# Patient Record
Sex: MALE | Race: WHITE | Employment: FULL TIME | URBAN - METROPOLITAN AREA
[De-identification: names, ages, dates, MRNs, and addresses within clinical notes are randomized per-mention and may not be internally consistent; named-entity substitution may affect disease eponyms.]

---

## 2017-03-06 DIAGNOSIS — R73.03 PREDIABETES: ICD-10-CM

## 2017-03-27 ENCOUNTER — OFFICE VISIT (OUTPATIENT)
Dept: FAMILY MEDICINE CLINIC | Age: 67
End: 2017-03-27
Payer: MEDICARE

## 2017-03-27 VITALS
BODY MASS INDEX: 38.6 KG/M2 | SYSTOLIC BLOOD PRESSURE: 107 MMHG | HEIGHT: 70 IN | TEMPERATURE: 97.2 F | WEIGHT: 269.6 LBS | DIASTOLIC BLOOD PRESSURE: 64 MMHG | HEART RATE: 61 BPM

## 2017-03-27 DIAGNOSIS — R21 RASH: Primary | ICD-10-CM

## 2017-03-27 PROCEDURE — 99213 OFFICE O/P EST LOW 20 MIN: CPT | Performed by: STUDENT IN AN ORGANIZED HEALTH CARE EDUCATION/TRAINING PROGRAM

## 2017-03-27 RX ORDER — CEPHALEXIN 500 MG/1
500 CAPSULE ORAL 2 TIMES DAILY
Qty: 14 CAPSULE | Refills: 0 | Status: SHIPPED | OUTPATIENT
Start: 2017-03-27 | End: 2017-04-03

## 2017-03-27 RX ORDER — PREDNISONE 20 MG/1
20 TABLET ORAL 2 TIMES DAILY
Qty: 10 TABLET | Refills: 0 | Status: SHIPPED | OUTPATIENT
Start: 2017-03-27 | End: 2017-04-01

## 2017-03-27 RX ORDER — HYDROXYZINE HYDROCHLORIDE 25 MG/1
25 TABLET, FILM COATED ORAL EVERY 8 HOURS PRN
Qty: 45 TABLET | Refills: 0 | Status: SHIPPED | OUTPATIENT
Start: 2017-03-27 | End: 2017-04-11

## 2017-03-27 ASSESSMENT — ENCOUNTER SYMPTOMS
SINUS PRESSURE: 0
NAUSEA: 0
VOMITING: 0
EYE REDNESS: 0
COUGH: 0
ABDOMINAL PAIN: 0
SHORTNESS OF BREATH: 0
EYE PAIN: 0
COLOR CHANGE: 1
RHINORRHEA: 0

## 2017-04-06 DIAGNOSIS — I25.10 CORONARY ARTERY DISEASE DUE TO LIPID RICH PLAQUE: ICD-10-CM

## 2017-04-06 DIAGNOSIS — I25.83 CORONARY ARTERY DISEASE DUE TO LIPID RICH PLAQUE: ICD-10-CM

## 2017-04-06 RX ORDER — METOPROLOL TARTRATE 50 MG/1
50 TABLET, FILM COATED ORAL 2 TIMES DAILY
Qty: 60 TABLET | Refills: 3 | Status: SHIPPED | OUTPATIENT
Start: 2017-04-06 | End: 2021-09-21 | Stop reason: SDUPTHER

## 2017-06-04 DIAGNOSIS — R73.03 PREDIABETES: ICD-10-CM

## 2017-06-05 ENCOUNTER — HOSPITAL ENCOUNTER (OUTPATIENT)
Age: 67
Setting detail: SPECIMEN
Discharge: HOME | End: 2017-06-05
Payer: MEDICARE

## 2017-06-05 LAB
ALT SERPL-CCNC: 19 U/L (ref 5–41)
AST SERPL-CCNC: 16 U/L
CHOLESTEROL/HDL RATIO: 4
CHOLESTEROL: 166 MG/DL
GGT: 54 U/L (ref 8–61)
HDLC SERPL-MCNC: 41 MG/DL
LDL CHOLESTEROL: 87 MG/DL (ref 0–130)
TRIGL SERPL-MCNC: 190 MG/DL
VLDLC SERPL CALC-MCNC: ABNORMAL MG/DL (ref 1–30)

## 2017-06-19 ENCOUNTER — OFFICE VISIT (OUTPATIENT)
Dept: FAMILY MEDICINE CLINIC | Age: 67
End: 2017-06-19
Payer: MEDICARE

## 2017-06-19 VITALS
HEIGHT: 70 IN | DIASTOLIC BLOOD PRESSURE: 69 MMHG | BODY MASS INDEX: 38.51 KG/M2 | HEART RATE: 58 BPM | SYSTOLIC BLOOD PRESSURE: 112 MMHG | TEMPERATURE: 97.4 F | WEIGHT: 269 LBS

## 2017-06-19 DIAGNOSIS — I10 ESSENTIAL HYPERTENSION: ICD-10-CM

## 2017-06-19 DIAGNOSIS — R73.03 PREDIABETES: Primary | ICD-10-CM

## 2017-06-19 LAB — HBA1C MFR BLD: 6.9 %

## 2017-06-19 PROCEDURE — 99213 OFFICE O/P EST LOW 20 MIN: CPT | Performed by: FAMILY MEDICINE

## 2017-06-19 PROCEDURE — 83036 HEMOGLOBIN GLYCOSYLATED A1C: CPT | Performed by: FAMILY MEDICINE

## 2017-06-19 ASSESSMENT — ENCOUNTER SYMPTOMS
WHEEZING: 0
SHORTNESS OF BREATH: 0
GASTROINTESTINAL NEGATIVE: 1

## 2017-09-19 ENCOUNTER — OFFICE VISIT (OUTPATIENT)
Dept: FAMILY MEDICINE CLINIC | Age: 67
End: 2017-09-19
Payer: MEDICARE

## 2017-09-19 VITALS
TEMPERATURE: 97.2 F | BODY MASS INDEX: 37.14 KG/M2 | WEIGHT: 259.4 LBS | SYSTOLIC BLOOD PRESSURE: 109 MMHG | DIASTOLIC BLOOD PRESSURE: 72 MMHG | HEART RATE: 50 BPM | HEIGHT: 70 IN

## 2017-09-19 DIAGNOSIS — I10 ESSENTIAL HYPERTENSION: Primary | ICD-10-CM

## 2017-09-19 DIAGNOSIS — L91.8 SKIN TAG: ICD-10-CM

## 2017-09-19 DIAGNOSIS — Z00.00 HEALTH CARE MAINTENANCE: ICD-10-CM

## 2017-09-19 PROCEDURE — 90471 IMMUNIZATION ADMIN: CPT | Performed by: FAMILY MEDICINE

## 2017-09-19 PROCEDURE — 90688 IIV4 VACCINE SPLT 0.5 ML IM: CPT | Performed by: FAMILY MEDICINE

## 2017-09-19 PROCEDURE — 99213 OFFICE O/P EST LOW 20 MIN: CPT | Performed by: FAMILY MEDICINE

## 2017-09-19 RX ORDER — DOXYCYCLINE HYCLATE 100 MG
100 TABLET ORAL 2 TIMES DAILY
Qty: 20 TABLET | Refills: 0 | Status: SHIPPED | OUTPATIENT
Start: 2017-09-19 | End: 2017-09-29

## 2017-09-19 ASSESSMENT — PATIENT HEALTH QUESTIONNAIRE - PHQ9
SUM OF ALL RESPONSES TO PHQ9 QUESTIONS 1 & 2: 0
1. LITTLE INTEREST OR PLEASURE IN DOING THINGS: 0
2. FEELING DOWN, DEPRESSED OR HOPELESS: 0
SUM OF ALL RESPONSES TO PHQ QUESTIONS 1-9: 0

## 2017-09-19 ASSESSMENT — ENCOUNTER SYMPTOMS
GASTROINTESTINAL NEGATIVE: 1
SHORTNESS OF BREATH: 0
COUGH: 0

## 2017-09-24 DIAGNOSIS — R73.03 PREDIABETES: ICD-10-CM

## 2017-09-28 ENCOUNTER — HOSPITAL ENCOUNTER (OUTPATIENT)
Age: 67
Setting detail: SPECIMEN
Discharge: HOME | End: 2017-09-28
Payer: MEDICARE

## 2017-09-28 ENCOUNTER — PROCEDURE VISIT (OUTPATIENT)
Dept: FAMILY MEDICINE CLINIC | Age: 67
End: 2017-09-28
Payer: MEDICARE

## 2017-09-28 VITALS
HEART RATE: 60 BPM | WEIGHT: 261.4 LBS | BODY MASS INDEX: 37.42 KG/M2 | TEMPERATURE: 97.4 F | SYSTOLIC BLOOD PRESSURE: 119 MMHG | DIASTOLIC BLOOD PRESSURE: 70 MMHG | HEIGHT: 70 IN

## 2017-09-28 DIAGNOSIS — L91.8 SKIN TAG: Primary | ICD-10-CM

## 2017-09-28 PROCEDURE — 11401 EXC TR-EXT B9+MARG 0.6-1 CM: CPT | Performed by: STUDENT IN AN ORGANIZED HEALTH CARE EDUCATION/TRAINING PROGRAM

## 2017-10-02 LAB — DERMATOLOGY PATHOLOGY REPORT: NORMAL

## 2017-10-06 ENCOUNTER — OFFICE VISIT (OUTPATIENT)
Dept: FAMILY MEDICINE CLINIC | Age: 67
End: 2017-10-06
Payer: MEDICARE

## 2017-10-06 VITALS
BODY MASS INDEX: 37.31 KG/M2 | DIASTOLIC BLOOD PRESSURE: 67 MMHG | HEART RATE: 59 BPM | WEIGHT: 260.6 LBS | SYSTOLIC BLOOD PRESSURE: 110 MMHG | TEMPERATURE: 96.3 F | HEIGHT: 70 IN

## 2017-10-06 DIAGNOSIS — Z48.02 VISIT FOR SUTURE REMOVAL: Primary | ICD-10-CM

## 2017-10-06 PROCEDURE — 99024 POSTOP FOLLOW-UP VISIT: CPT | Performed by: FAMILY MEDICINE

## 2017-10-06 ASSESSMENT — ENCOUNTER SYMPTOMS
ABDOMINAL PAIN: 0
COUGH: 0
DIARRHEA: 0
SHORTNESS OF BREATH: 0
NAUSEA: 0
WHEEZING: 0
BLURRED VISION: 0
CONSTIPATION: 0
VOMITING: 0

## 2017-10-06 NOTE — MR AVS SNAPSHOT
After Visit Summary             Rubi France   10/6/2017 9:15 AM   Office Visit    Description:  Male : 1950   Provider:  Jose A Martinez MD   Department:  [facility]              Your Follow-Up and Future Appointments         Below is a list of your follow-up and future appointments. This may not be a complete list as you may have made appointments directly with providers that we are not aware of or your providers may have made some for you. Please call your providers to confirm appointments. It is important to keep your appointments. Please bring your current insurance card, photo ID, co-pay, and all medication bottles to your appointment. If self-pay, payment is expected at the time of service. Your To-Do List     Follow-Up    Return if symptoms worsen or fail to improve. Information from Your Visit        Department     Name Address Phone Fax    [phone] [phone]      You Were Seen for:         Comments    Visit for suture removal   [[id number]]         Vital Signs     Blood Pressure Pulse Temperature Height Weight Body Mass Index    110/67 (Site: Left Arm, Position: Sitting, Cuff Size: Large Adult) 59 96.3 °F (35.7 °C) (Temporal) 5' 10\" (1.778 m) 260 lb 9.6 oz (118.2 kg) 37.39 kg/m2    Smoking Status                   Former Smoker           Additional Information about your Body Mass Index (BMI)           Your BMI as listed above is considered obese (30 or more). BMI is an estimate of body fat, calculated from your height and weight. The higher your BMI, the greater your risk of heart disease, high blood pressure, type 2 diabetes, stroke, gallstones, arthritis, sleep apnea, and certain cancers. BMI is not perfect. It may overestimate body fat in athletes and people who are more muscular.   Even a small weight loss (between 5 and 10 percent of your current weight) by decreasing your calorie intake and becoming more physically active will help lower your risk of developing or worsening diseases associated with obesity. Learn more at: [employer]          Instructions    Thank you for letting us take care of you today. We hope all your questions were addressed. If a question was overlooked or something else comes to mind after you return home, please contact a member of your Care Team listed below. Please make sure you have a routine office visit set up to follow-up on [address]. Your Care Team at [place] [address] is Team #2  Jaron Jacobo DO (Faculty)  Rajwinder Garcia MD (Resindent)  Sandy Crowder MD (Resident)  Lilli Barlow MD (Resident)  Lex Pacheco MD (Resident)  Dewey Dial MD (Resident)  JESSI Lebron., A  Enoch Parikh, GARCIA Walls ([facility])  Eliz Gallegos RN, ([address] [facility])  Rupal Sheridan, Ph.D., (Behavioral Services)  [address] (Clinical Pharmacist)     Office phone number: [phone]    If you need to get in right away due to illness, please be advised we have \"Same Day\" appointments available Monday-Friday. Please call us at [phone] option #1 to schedule your \"Same Day\" appointment. Medications and Orders      Your Current Medications Are              metFORMIN (GLUCOPHAGE) 1000 MG tablet TAKE ONE TABLET BY MOUTH TWICE A DAY WITH MEALS    metoprolol tartrate (LOPRESSOR) 50 MG tablet Take 1 tablet by mouth 2 times daily    furosemide (LASIX) 40 MG tablet Take 1 tablet by mouth daily    aspirin (ASPIRIN LOW DOSE) 81 MG chewable tablet CHEW ONE TABLET BY MOUTH DAILY    pravastatin (PRAVACHOL) 20 MG tablet Take 1 tablet by mouth daily    Lancets MISC 1 each by Does not apply route daily Test 2 times daily.  Diagnosis DM    Glucose Blood (BLOOD GLUCOSE TEST STRIPS) STRP 1 each by In Vitro route 2 times daily Test 2 times daily. Diagnosis DM    Blood Glucose Monitoring Suppl (BLOOD GLUCOSE METER) KIT Test 2 times daily Diagnosis DM    Alcohol Swabs (ALCOHOL PREP) 70 % PADS 1 each by Does not apply route 2 times daily. Test 2 times daily. Diagnosis DM    Lancet Device MISC 1 Device by Does not apply route once for 1 dose. Test 2 times daily. Diagnosis DM      Allergies           No Known Allergies         Additional Information        Basic Information     Date Of Birth Sex Race Ethnicity Preferred Language    1950 Male White Non-/Non  English      Problem List as of 10/6/2017  Date Reviewed: 6/19/2017                Essential hypertension    Health care maintenance    Chronic congestive heart failure (HCC)    Pre-diabetes    Chronic diastolic congestive heart failure (HCC)    Medication refill    Obesity    Metabolic syndrome    CAD (coronary artery disease)    Ankle ulcer ([facility] [address]    Metabolic syndrome      Your Goals as of 10/6/2017 at 9:46 AM                 Weight    Weight < 200 lb (90.719 kg)       Immunizations as of 10/6/2017     Name Date    Influenza Virus Vaccine 10/9/2015, 10/20/2014    Influenza, Quadv, 3 Years and older, IM 9/19/2017, 12/9/2016    Pneumococcal Polysaccharide (Nulihfnsj29) 6/7/2012    Tdap (Boostrix, Adacel) 12/9/2016      Preventive Care        Date Due    Cholesterol Screening 6/5/2022    Colonoscopy 8/1/2023    Tetanus Combination Vaccine (2 - Td) 12/9/2026            MyChart Signup           Our records indicate that you have declined MyChart signup.

## 2017-10-06 NOTE — PROGRESS NOTES
Subjective:    Cole Hanna is a 61 y.o. male with  has a past medical history of Acute coronary syndrome ([address] CAD (coronary artery disease); Chronic diastolic congestive heart failure ([address] Essential hypertension; Hyperlipidemia; Kidney stones; Metabolic syndrome; Obesity; and Umbilical hernia. HPI Patient is a 61year old male who is here for follow-up. Patient had a skin tag removed from his right upper thigh last week. After the excision the patient received one stitch. Today on physical examination, the suture site looks clean dry and non-infected. No other complaints from patient. Review of Systems   Constitutional: Negative for chills and fever. Eyes: Negative for blurred vision. Respiratory: Negative for cough, shortness of breath and wheezing. Cardiovascular: Negative for chest pain. Gastrointestinal: Negative for abdominal pain, constipation, diarrhea, nausea and vomiting. Neurological: Negative for dizziness and headaches. Objective:    /67 (Site: Left Arm, Position: Sitting, Cuff Size: Large Adult)  Pulse 59  Temp 96.3 °F (35.7 °C) (Temporal)   Ht 5' 10\" (1.778 m)  Wt 260 lb 9.6 oz (118.2 kg)  BMI 37.39 kg/m2   BP Readings from Last 3 Encounters:   10/06/17 110/67   09/28/17 119/70   09/19/17 109/72     Physical Exam   Constitutional: He is oriented to person, place, and time and well-developed, well-nourished, and in no distress. No distress. HENT:   Head: Normocephalic and atraumatic. Eyes: Pupils are equal, round, and reactive to light. Cardiovascular: Normal rate, regular rhythm, normal heart sounds and intact distal pulses. No murmur heard. Pulmonary/Chest: Effort normal and breath sounds normal. He has no wheezes. Lymphadenopathy:     He has no cervical adenopathy. Neurological: He is alert and oriented to person, place, and time. Skin: He is not diaphoretic. Nursing note and vitals reviewed.     BP Readings from Last 3 Encounters: 10/06/17 110/67   09/28/17 119/70   09/19/17 109/72     /67 (Site: Left Arm, Position: Sitting, Cuff Size: Large Adult)  Pulse 59  Temp 96.3 °F (35.7 °C) (Temporal)   Ht 5' 10\" (1.778 m)  Wt 260 lb 9.6 oz (118.2 kg)  BMI 37.39 kg/m2  Lab Results   Component Value Date    WBC 8.2 06/18/2012    HGB 14.9 06/18/2012    HCT 43.9 06/18/2012     06/18/2012    CHOL 166 06/05/2017    TRIG 190 (H) 06/05/2017    HDL 41 06/05/2017    ALT 19 06/05/2017    AST 16 06/05/2017     12/09/2016    K 4.2 12/09/2016    CL 98 12/09/2016    CREATININE 1.02 12/09/2016    BUN 10 12/09/2016    CO2 27 12/09/2016    PSA 0.33 09/19/2013    INR 1.0 02/24/2012    LABA1C 6.9 06/19/2017    LABMICR 9 09/01/2015     Lab Results   Component Value Date    CALCIUM 8.9 12/09/2016     Lab Results   Component Value Date    LDLCHOLESTEROL 87 06/05/2017       Assessment and Plan:    1. Visit for suture removal  - One suture removed successfully without pain or complications. Patient advised to keep site clean and dry. Return if any complications. Patient expressed understanding. Lillian Warren received counseling on the following healthy behaviors: nutrition and exercise  Reviewed prior labs and health maintenance. Continue current medications, diet and exercise. Discussed use, benefit, and side effects of prescribed medications. Barriers to medication compliance addressed. Patient given educational materials - see patient instructions. All patient questions answered. Patient voiced understanding. Requested Prescriptions      No prescriptions requested or ordered in this encounter       There are no discontinued medications.

## 2017-10-06 NOTE — PATIENT INSTRUCTIONS
Thank you for letting us take care of you today. We hope all your questions were addressed. If a question was overlooked or something else comes to mind after you return home, please contact a member of your Care Team listed below. Please make sure you have a routine office visit set up to follow-up on [address]. Your Care Team at [place] [address] is Team #2  Latesha Wallace DO (Faculty)  Christiane Oliva MD (Resindent)  Avelina Stevenson MD (Resident)  Corwin Jordan MD (Resident)  Cinthya Connors MD (Resident)  Naresh Verduzco MD (Resident)  JESSI Alfredo, GARCIA Langston, GARCIA Yoon ([facility])  Brian Scheuermann RN, ([address] [facility])  Katlyn Mora, Ph.D., (Behavioral Services)  Atul Tao, [facility] (Clinical Pharmacist)     Office phone number: [phone]    If you need to get in right away due to illness, please be advised we have \"Same Day\" appointments available Monday-Friday. Please call us at [phone] option #1 to schedule your \"Same Day\" appointment.

## 2017-11-27 DIAGNOSIS — R73.03 PREDIABETES: ICD-10-CM

## 2017-12-26 DIAGNOSIS — R73.03 PREDIABETES: ICD-10-CM

## 2018-01-31 DIAGNOSIS — R73.03 PREDIABETES: ICD-10-CM

## 2018-03-04 DIAGNOSIS — R73.03 PREDIABETES: ICD-10-CM

## 2018-03-09 DIAGNOSIS — R73.03 PREDIABETES: ICD-10-CM

## 2018-03-22 ENCOUNTER — OFFICE VISIT (OUTPATIENT)
Dept: FAMILY MEDICINE CLINIC | Age: 68
End: 2018-03-22
Payer: MEDICARE

## 2018-03-22 ENCOUNTER — HOSPITAL ENCOUNTER (OUTPATIENT)
Age: 68
Setting detail: SPECIMEN
Discharge: HOME | End: 2018-03-22
Payer: MEDICARE

## 2018-03-22 VITALS
TEMPERATURE: 97.2 F | WEIGHT: 261.8 LBS | BODY MASS INDEX: 37.48 KG/M2 | HEART RATE: 51 BPM | HEIGHT: 70 IN | SYSTOLIC BLOOD PRESSURE: 132 MMHG | DIASTOLIC BLOOD PRESSURE: 79 MMHG

## 2018-03-22 DIAGNOSIS — E08.00 DIABETES MELLITUS DUE TO UNDERLYING CONDITION WITH HYPEROSMOLARITY WITHOUT COMA, WITHOUT LONG-TERM CURRENT USE OF INSULIN (HCC): ICD-10-CM

## 2018-03-22 DIAGNOSIS — Z00.00 HEALTH CARE MAINTENANCE: ICD-10-CM

## 2018-03-22 DIAGNOSIS — I10 ESSENTIAL HYPERTENSION: Primary | ICD-10-CM

## 2018-03-22 DIAGNOSIS — I10 ESSENTIAL HYPERTENSION: ICD-10-CM

## 2018-03-22 LAB
ALT SERPL-CCNC: 20 U/L (ref 5–41)
ANION GAP SERPL CALCULATED.3IONS-SCNC: 11 MMOL/L (ref 9–17)
BUN BLDV-MCNC: 11 MG/DL (ref 8–23)
BUN/CREAT BLD: ABNORMAL (ref 9–20)
CALCIUM SERPL-MCNC: 9.1 MG/DL (ref 8.6–10.4)
CHLORIDE BLD-SCNC: 98 MMOL/L (ref 98–107)
CHOLESTEROL/HDL RATIO: 4.2
CHOLESTEROL: 183 MG/DL
CO2: 28 MMOL/L (ref 20–31)
CREAT SERPL-MCNC: 0.82 MG/DL (ref 0.7–1.2)
GFR AFRICAN AMERICAN: >60 ML/MIN
GFR NON-AFRICAN AMERICAN: >60 ML/MIN
GFR SERPL CREATININE-BSD FRML MDRD: ABNORMAL ML/MIN/{1.73_M2}
GFR SERPL CREATININE-BSD FRML MDRD: ABNORMAL ML/MIN/{1.73_M2}
GLUCOSE BLD-MCNC: 112 MG/DL (ref 70–99)
HBA1C MFR BLD: 6.2 %
HDLC SERPL-MCNC: 44 MG/DL
LDL CHOLESTEROL: 99 MG/DL (ref 0–130)
POTASSIUM SERPL-SCNC: 4.3 MMOL/L (ref 3.7–5.3)
SODIUM BLD-SCNC: 137 MMOL/L (ref 135–144)
TRIGL SERPL-MCNC: 198 MG/DL
VLDLC SERPL CALC-MCNC: ABNORMAL MG/DL (ref 1–30)

## 2018-03-22 PROCEDURE — 4040F PNEUMOC VAC/ADMIN/RCVD: CPT | Performed by: FAMILY MEDICINE

## 2018-03-22 PROCEDURE — 99213 OFFICE O/P EST LOW 20 MIN: CPT | Performed by: FAMILY MEDICINE

## 2018-03-22 PROCEDURE — 90670 PCV13 VACCINE IM: CPT

## 2018-03-22 PROCEDURE — G8598 ASA/ANTIPLAT THER USED: HCPCS | Performed by: FAMILY MEDICINE

## 2018-03-22 PROCEDURE — 83036 HEMOGLOBIN GLYCOSYLATED A1C: CPT | Performed by: FAMILY MEDICINE

## 2018-03-22 PROCEDURE — G8482 FLU IMMUNIZE ORDER/ADMIN: HCPCS | Performed by: FAMILY MEDICINE

## 2018-03-22 PROCEDURE — G8427 DOCREV CUR MEDS BY ELIG CLIN: HCPCS | Performed by: FAMILY MEDICINE

## 2018-03-22 PROCEDURE — 1036F TOBACCO NON-USER: CPT | Performed by: FAMILY MEDICINE

## 2018-03-22 PROCEDURE — 3017F COLORECTAL CA SCREEN DOC REV: CPT | Performed by: FAMILY MEDICINE

## 2018-03-22 PROCEDURE — 1123F ACP DISCUSS/DSCN MKR DOCD: CPT | Performed by: FAMILY MEDICINE

## 2018-03-22 PROCEDURE — G8417 CALC BMI ABV UP PARAM F/U: HCPCS | Performed by: FAMILY MEDICINE

## 2018-03-22 ASSESSMENT — ENCOUNTER SYMPTOMS
COUGH: 0
GASTROINTESTINAL NEGATIVE: 1
SHORTNESS OF BREATH: 0

## 2018-03-22 NOTE — PROGRESS NOTES
reviewed. Assessment and plan:       1. Essential hypertension  - Stable  - Continue taking current medications  - DAsh diet and exercise  - Basic Metabolic Panel; Future    2. Diabetes mellitus , without long-term current use of insulin (HCC)  - Hemoglobin A1c today in office is 6.2%  It has come down from 6.9%  - Continue taking metformin  - metFORMIN (GLUCOPHAGE) 1000 MG tablet; Take 1 tablet by mouth 2 times daily (with meals) TAKE ONE TABLET BY MOUTH TWICE A DAY WITH MEALS  Dispense: 180 tablet; Refill: 0  - POCT glycosylated hemoglobin (Hb A1C)    3. Health care maintenance  - PREVNAR 13 IM (Pneumococcal conjugate vaccine 13-valent)        Requested Prescriptions     Signed Prescriptions Disp Refills    metFORMIN (GLUCOPHAGE) 1000 MG tablet 180 tablet 0     Sig: Take 1 tablet by mouth 2 times daily (with meals) TAKE ONE TABLET BY MOUTH TWICE A DAY WITH MEALS       Medications Discontinued During This Encounter   Medication Reason    metFORMIN (GLUCOPHAGE) 1000 MG tablet Reorder       Deep Campbell received counseling on the following healthy behaviors: nutrition, exercise and medication adherence    Patient given educational materials : see patient instruction     Discussed use, benefit, and side effects of prescribed medications. Barriers to medication compliance addressed. All patient questions answered. Pt voiced understanding. Return in about 3 months (around 6/22/2018) for htn, DM.

## 2018-04-07 DIAGNOSIS — R73.03 PREDIABETES: ICD-10-CM

## 2018-04-19 ENCOUNTER — OFFICE VISIT (OUTPATIENT)
Dept: FAMILY MEDICINE CLINIC | Age: 68
End: 2018-04-19
Payer: MEDICARE

## 2018-04-19 VITALS
SYSTOLIC BLOOD PRESSURE: 123 MMHG | DIASTOLIC BLOOD PRESSURE: 75 MMHG | HEIGHT: 70 IN | HEART RATE: 55 BPM | TEMPERATURE: 97 F | BODY MASS INDEX: 36.94 KG/M2 | WEIGHT: 258 LBS

## 2018-04-19 DIAGNOSIS — J06.9 VIRAL URI: Primary | ICD-10-CM

## 2018-04-19 DIAGNOSIS — I50.32 CHRONIC DIASTOLIC CONGESTIVE HEART FAILURE (HCC): ICD-10-CM

## 2018-04-19 PROCEDURE — 99213 OFFICE O/P EST LOW 20 MIN: CPT | Performed by: INTERNAL MEDICINE

## 2018-04-19 PROCEDURE — G8598 ASA/ANTIPLAT THER USED: HCPCS | Performed by: INTERNAL MEDICINE

## 2018-04-19 PROCEDURE — 1036F TOBACCO NON-USER: CPT | Performed by: INTERNAL MEDICINE

## 2018-04-19 PROCEDURE — 99213 OFFICE O/P EST LOW 20 MIN: CPT

## 2018-04-19 PROCEDURE — G8427 DOCREV CUR MEDS BY ELIG CLIN: HCPCS | Performed by: INTERNAL MEDICINE

## 2018-04-19 PROCEDURE — 4040F PNEUMOC VAC/ADMIN/RCVD: CPT | Performed by: INTERNAL MEDICINE

## 2018-04-19 PROCEDURE — 1123F ACP DISCUSS/DSCN MKR DOCD: CPT | Performed by: INTERNAL MEDICINE

## 2018-04-19 PROCEDURE — G8417 CALC BMI ABV UP PARAM F/U: HCPCS | Performed by: INTERNAL MEDICINE

## 2018-04-19 PROCEDURE — 3017F COLORECTAL CA SCREEN DOC REV: CPT | Performed by: INTERNAL MEDICINE

## 2018-04-19 RX ORDER — ALBUTEROL SULFATE 90 UG/1
2 AEROSOL, METERED RESPIRATORY (INHALATION) EVERY 6 HOURS PRN
Qty: 1 INHALER | Refills: 2 | Status: SHIPPED | OUTPATIENT
Start: 2018-04-19 | End: 2021-09-21 | Stop reason: SDUPTHER

## 2018-04-19 ASSESSMENT — ENCOUNTER SYMPTOMS
ABDOMINAL PAIN: 0
COUGH: 1
SHORTNESS OF BREATH: 1

## 2018-04-23 ENCOUNTER — HOSPITAL ENCOUNTER (OUTPATIENT)
Age: 68
Discharge: HOME | End: 2018-04-25
Payer: MEDICARE

## 2018-04-23 ENCOUNTER — HOSPITAL ENCOUNTER (OUTPATIENT)
Dept: GENERAL RADIOLOGY | Age: 68
Discharge: HOME | End: 2018-04-25
Payer: MEDICARE

## 2018-04-23 DIAGNOSIS — J06.9 VIRAL URI: ICD-10-CM

## 2018-04-23 PROCEDURE — 71046 X-RAY EXAM CHEST 2 VIEWS: CPT

## 2018-06-27 ENCOUNTER — OFFICE VISIT (OUTPATIENT)
Dept: FAMILY MEDICINE CLINIC | Age: 68
End: 2018-06-27
Payer: MEDICARE

## 2018-06-27 VITALS
BODY MASS INDEX: 37.34 KG/M2 | SYSTOLIC BLOOD PRESSURE: 117 MMHG | DIASTOLIC BLOOD PRESSURE: 72 MMHG | HEART RATE: 52 BPM | WEIGHT: 260.8 LBS | HEIGHT: 70 IN | TEMPERATURE: 97.5 F

## 2018-06-27 DIAGNOSIS — I10 ESSENTIAL HYPERTENSION: Primary | ICD-10-CM

## 2018-06-27 DIAGNOSIS — R73.03 PREDIABETES: ICD-10-CM

## 2018-06-27 PROCEDURE — 1036F TOBACCO NON-USER: CPT | Performed by: FAMILY MEDICINE

## 2018-06-27 PROCEDURE — G8427 DOCREV CUR MEDS BY ELIG CLIN: HCPCS | Performed by: FAMILY MEDICINE

## 2018-06-27 PROCEDURE — 1123F ACP DISCUSS/DSCN MKR DOCD: CPT | Performed by: FAMILY MEDICINE

## 2018-06-27 PROCEDURE — 3017F COLORECTAL CA SCREEN DOC REV: CPT | Performed by: FAMILY MEDICINE

## 2018-06-27 PROCEDURE — G8598 ASA/ANTIPLAT THER USED: HCPCS | Performed by: FAMILY MEDICINE

## 2018-06-27 PROCEDURE — 99213 OFFICE O/P EST LOW 20 MIN: CPT | Performed by: FAMILY MEDICINE

## 2018-06-27 PROCEDURE — G8417 CALC BMI ABV UP PARAM F/U: HCPCS | Performed by: FAMILY MEDICINE

## 2018-06-27 PROCEDURE — 4040F PNEUMOC VAC/ADMIN/RCVD: CPT | Performed by: FAMILY MEDICINE

## 2018-06-27 ASSESSMENT — ENCOUNTER SYMPTOMS
GASTROINTESTINAL NEGATIVE: 1
COUGH: 0
SHORTNESS OF BREATH: 0

## 2018-10-04 DIAGNOSIS — R73.03 PREDIABETES: ICD-10-CM

## 2018-11-01 ENCOUNTER — OFFICE VISIT (OUTPATIENT)
Dept: FAMILY MEDICINE CLINIC | Age: 68
End: 2018-11-01
Payer: MEDICARE

## 2018-11-01 VITALS
WEIGHT: 244.4 LBS | DIASTOLIC BLOOD PRESSURE: 75 MMHG | HEART RATE: 60 BPM | HEIGHT: 70 IN | SYSTOLIC BLOOD PRESSURE: 120 MMHG | TEMPERATURE: 97.6 F | BODY MASS INDEX: 34.99 KG/M2

## 2018-11-01 DIAGNOSIS — I10 ESSENTIAL HYPERTENSION: Primary | ICD-10-CM

## 2018-11-01 DIAGNOSIS — Z13.6 ENCOUNTER FOR ABDOMINAL AORTIC ANEURYSM (AAA) SCREENING: ICD-10-CM

## 2018-11-01 DIAGNOSIS — R73.03 PREDIABETES: ICD-10-CM

## 2018-11-01 DIAGNOSIS — L97.321 SKIN ULCER OF LEFT ANKLE, LIMITED TO BREAKDOWN OF SKIN (HCC): ICD-10-CM

## 2018-11-01 LAB — HBA1C MFR BLD: 6.3 %

## 2018-11-01 PROCEDURE — G8482 FLU IMMUNIZE ORDER/ADMIN: HCPCS | Performed by: STUDENT IN AN ORGANIZED HEALTH CARE EDUCATION/TRAINING PROGRAM

## 2018-11-01 PROCEDURE — 1123F ACP DISCUSS/DSCN MKR DOCD: CPT | Performed by: STUDENT IN AN ORGANIZED HEALTH CARE EDUCATION/TRAINING PROGRAM

## 2018-11-01 PROCEDURE — 3017F COLORECTAL CA SCREEN DOC REV: CPT | Performed by: STUDENT IN AN ORGANIZED HEALTH CARE EDUCATION/TRAINING PROGRAM

## 2018-11-01 PROCEDURE — 83036 HEMOGLOBIN GLYCOSYLATED A1C: CPT | Performed by: STUDENT IN AN ORGANIZED HEALTH CARE EDUCATION/TRAINING PROGRAM

## 2018-11-01 PROCEDURE — 99214 OFFICE O/P EST MOD 30 MIN: CPT | Performed by: STUDENT IN AN ORGANIZED HEALTH CARE EDUCATION/TRAINING PROGRAM

## 2018-11-01 PROCEDURE — G8417 CALC BMI ABV UP PARAM F/U: HCPCS | Performed by: STUDENT IN AN ORGANIZED HEALTH CARE EDUCATION/TRAINING PROGRAM

## 2018-11-01 PROCEDURE — 99213 OFFICE O/P EST LOW 20 MIN: CPT | Performed by: STUDENT IN AN ORGANIZED HEALTH CARE EDUCATION/TRAINING PROGRAM

## 2018-11-01 PROCEDURE — 1101F PT FALLS ASSESS-DOCD LE1/YR: CPT | Performed by: STUDENT IN AN ORGANIZED HEALTH CARE EDUCATION/TRAINING PROGRAM

## 2018-11-01 PROCEDURE — 90688 IIV4 VACCINE SPLT 0.5 ML IM: CPT | Performed by: STUDENT IN AN ORGANIZED HEALTH CARE EDUCATION/TRAINING PROGRAM

## 2018-11-01 PROCEDURE — 1036F TOBACCO NON-USER: CPT | Performed by: STUDENT IN AN ORGANIZED HEALTH CARE EDUCATION/TRAINING PROGRAM

## 2018-11-01 PROCEDURE — 4040F PNEUMOC VAC/ADMIN/RCVD: CPT | Performed by: STUDENT IN AN ORGANIZED HEALTH CARE EDUCATION/TRAINING PROGRAM

## 2018-11-01 PROCEDURE — G8598 ASA/ANTIPLAT THER USED: HCPCS | Performed by: STUDENT IN AN ORGANIZED HEALTH CARE EDUCATION/TRAINING PROGRAM

## 2018-11-01 PROCEDURE — G8427 DOCREV CUR MEDS BY ELIG CLIN: HCPCS | Performed by: STUDENT IN AN ORGANIZED HEALTH CARE EDUCATION/TRAINING PROGRAM

## 2018-11-01 ASSESSMENT — PATIENT HEALTH QUESTIONNAIRE - PHQ9
2. FEELING DOWN, DEPRESSED OR HOPELESS: 0
1. LITTLE INTEREST OR PLEASURE IN DOING THINGS: 0
SUM OF ALL RESPONSES TO PHQ QUESTIONS 1-9: 0
SUM OF ALL RESPONSES TO PHQ QUESTIONS 1-9: 0
SUM OF ALL RESPONSES TO PHQ9 QUESTIONS 1 & 2: 0

## 2018-11-01 ASSESSMENT — ENCOUNTER SYMPTOMS
CHEST TIGHTNESS: 0
ABDOMINAL PAIN: 0
SHORTNESS OF BREATH: 0
COUGH: 0

## 2018-11-01 NOTE — PROGRESS NOTES
Subjective:    Gabe Hernandez is a 79 y.o. male with  has a past medical history of Acute coronary syndrome ([facility] [address] CAD (coronary artery disease); Chronic diastolic congestive heart failure ([facility] [address] Essential hypertension; Hyperlipidemia; Kidney stones; Metabolic syndrome; Obesity; and Umbilical hernia. Presented to the office todayfor:  Chief Complaint   Patient presents with    Diabetes     here for 3 month dm follow up   [address]     patient would like flu shot       HPI    Patient is here for 6 month pre-diabetes and HTN f/u. His blood pressure is under control on lopressor and lasix. He denied any symptoms of elevated BP including headache, chest pain, blurry vision or SOB/fatigue. The patient's last A1c was 6.2, today it is 6.3%. He is taking 1000 mg metformin BID. He is compliant on medication and he is also compliant on diet and exercise. He denied any polyuria, poly dipsia or polyphagia. Denied any numbness or tingling. Review of Systems   Constitutional: Negative for activity change, appetite change, chills and fatigue. HENT: Negative for congestion. Respiratory: Negative for cough, chest tightness and shortness of breath. Cardiovascular: Negative for chest pain and palpitations. Gastrointestinal: Negative for abdominal pain. Endocrine: Negative for polydipsia, polyphagia and polyuria. Genitourinary: Negative for difficulty urinating. Neurological: Negative for numbness. The patient has a No family history on file. Objective:    /75 (Site: Right Upper Arm, Position: Sitting, Cuff Size: Large Adult) Comment: machine  Pulse 60   Temp 97.6 °F (36.4 °C) (Oral)   Ht 5' 10\" (1.778 m)   Wt 244 lb 6.4 oz (110.9 kg)   BMI 35.07 kg/m²    BP Readings from Last 3 Encounters:   11/01/18 120/75   06/27/18 117/72   04/19/18 123/75       Physical Exam   Constitutional: He is oriented to person, place, and time.  He appears well-developed and

## 2019-04-23 ENCOUNTER — HOSPITAL ENCOUNTER (OUTPATIENT)
Age: 69
Setting detail: SPECIMEN
Discharge: HOME | End: 2019-04-23
Payer: MEDICARE

## 2019-04-23 LAB
CREATININE URINE: 173.3 MG/DL (ref 39–259)
MICROALBUMIN/CREAT 24H UR: 18 MG/L
MICROALBUMIN/CREAT UR-RTO: 10 MCG/MG CREAT

## 2019-04-25 ENCOUNTER — HOSPITAL ENCOUNTER (OUTPATIENT)
Dept: VASCULAR LAB | Age: 69
Discharge: HOME | End: 2019-04-25
Payer: MEDICARE

## 2019-04-25 DIAGNOSIS — Z13.6 ENCOUNTER FOR ABDOMINAL AORTIC ANEURYSM (AAA) SCREENING: ICD-10-CM

## 2019-04-25 PROCEDURE — 76706 US ABDL AORTA SCREEN AAA: CPT

## 2019-06-10 ENCOUNTER — OFFICE VISIT (OUTPATIENT)
Dept: FAMILY MEDICINE CLINIC | Age: 69
End: 2019-06-10
Payer: MEDICARE

## 2019-06-10 ENCOUNTER — HOSPITAL ENCOUNTER (OUTPATIENT)
Age: 69
Setting detail: SPECIMEN
Discharge: HOME | End: 2019-06-10
Payer: MEDICARE

## 2019-06-10 VITALS
HEIGHT: 70 IN | HEART RATE: 58 BPM | TEMPERATURE: 98 F | BODY MASS INDEX: 37.94 KG/M2 | DIASTOLIC BLOOD PRESSURE: 71 MMHG | WEIGHT: 265 LBS | SYSTOLIC BLOOD PRESSURE: 117 MMHG

## 2019-06-10 DIAGNOSIS — Z87.442 HISTORY OF KIDNEY STONES: ICD-10-CM

## 2019-06-10 DIAGNOSIS — I50.32 CHRONIC DIASTOLIC CONGESTIVE HEART FAILURE (HCC): ICD-10-CM

## 2019-06-10 DIAGNOSIS — I10 ESSENTIAL HYPERTENSION: Primary | ICD-10-CM

## 2019-06-10 DIAGNOSIS — L97.321 SKIN ULCER OF LEFT ANKLE, LIMITED TO BREAKDOWN OF SKIN (HCC): ICD-10-CM

## 2019-06-10 DIAGNOSIS — R73.03 PREDIABETES: ICD-10-CM

## 2019-06-10 DIAGNOSIS — I10 ESSENTIAL HYPERTENSION: ICD-10-CM

## 2019-06-10 LAB
ANION GAP SERPL CALCULATED.3IONS-SCNC: 12 MMOL/L (ref 9–17)
BUN BLDV-MCNC: 12 MG/DL (ref 8–23)
BUN/CREAT BLD: ABNORMAL (ref 9–20)
CALCIUM SERPL-MCNC: 9.2 MG/DL (ref 8.6–10.4)
CHLORIDE BLD-SCNC: 101 MMOL/L (ref 98–107)
CO2: 24 MMOL/L (ref 20–31)
CREAT SERPL-MCNC: 0.93 MG/DL (ref 0.7–1.2)
GFR AFRICAN AMERICAN: >60 ML/MIN
GFR NON-AFRICAN AMERICAN: >60 ML/MIN
GFR SERPL CREATININE-BSD FRML MDRD: ABNORMAL ML/MIN/{1.73_M2}
GFR SERPL CREATININE-BSD FRML MDRD: ABNORMAL ML/MIN/{1.73_M2}
GLUCOSE BLD-MCNC: 150 MG/DL (ref 70–99)
HBA1C MFR BLD: 6.4 %
POTASSIUM SERPL-SCNC: 4.4 MMOL/L (ref 3.7–5.3)
SODIUM BLD-SCNC: 137 MMOL/L (ref 135–144)

## 2019-06-10 PROCEDURE — 99211 OFF/OP EST MAY X REQ PHY/QHP: CPT | Performed by: STUDENT IN AN ORGANIZED HEALTH CARE EDUCATION/TRAINING PROGRAM

## 2019-06-10 PROCEDURE — 99213 OFFICE O/P EST LOW 20 MIN: CPT | Performed by: STUDENT IN AN ORGANIZED HEALTH CARE EDUCATION/TRAINING PROGRAM

## 2019-06-10 PROCEDURE — 83036 HEMOGLOBIN GLYCOSYLATED A1C: CPT | Performed by: STUDENT IN AN ORGANIZED HEALTH CARE EDUCATION/TRAINING PROGRAM

## 2019-06-10 ASSESSMENT — ENCOUNTER SYMPTOMS
STRIDOR: 0
CHEST TIGHTNESS: 0
SHORTNESS OF BREATH: 0
ABDOMINAL PAIN: 0
WHEEZING: 0

## 2019-06-10 ASSESSMENT — PATIENT HEALTH QUESTIONNAIRE - PHQ9
SUM OF ALL RESPONSES TO PHQ9 QUESTIONS 1 & 2: 0
SUM OF ALL RESPONSES TO PHQ QUESTIONS 1-9: 0
SUM OF ALL RESPONSES TO PHQ QUESTIONS 1-9: 0
2. FEELING DOWN, DEPRESSED OR HOPELESS: 0
1. LITTLE INTEREST OR PLEASURE IN DOING THINGS: 0

## 2019-06-10 NOTE — PROGRESS NOTES
care with him  - [facility]) - Astrid Georges MD, Urology, [place]    4. Skin ulcer of left ankle, limited to breakdown of skin ([address]  -resolved    5. Chronic diastolic congestive heart failure (HCC)  -states that cardiology discontinued his lasix  -removed from med list  -stable, no SOB, minimal lower extremity edema          Requested Prescriptions     Signed Prescriptions Disp Refills    metFORMIN (GLUCOPHAGE) 1000 MG tablet 180 tablet 1     Sig: Take 1 tablet by mouth 2 times daily (with meals)       Medications Discontinued During This Encounter   Medication Reason    metFORMIN (GLUCOPHAGE) 1000 MG tablet Rafael Ruby received counseling on the following healthy behaviors: nutrition, exercise and medication adherence    Discussed use,benefit, and side effects of prescribed medications. Barriers to medication compliance addressed. All patient questions answered. Pt voiced understanding. Return in about 6 months (around 12/10/2019) for follow up, diabetes, htn. Disclaimer: Some orall of this note was transcribed using voice-recognition software. This may cause typographical errors occasionally. Although all effort is made to fix these errors, please do not hesitate to contact our office if there Sincere Tijerina concern with the understanding of this note.

## 2019-06-10 NOTE — PROGRESS NOTES
Attending Physician Statement  I have discussed the care of Shadia Wren, 79 y.o. male,including pertinent history and exam findings,  with the resident Dr. Etta Subramanian MD.  History:  Chief Complaint   Patient presents with    Results     3 month follow up with lab results from 04/23    Other     Thinks he passed a kidney stone a few weeks ago. Possibly see Neurlogy.  Foot Pain     Would like referral to podiatry. Patient is here for follow up on prediabetes, hypertension and nephrolithiasis. I have reviewed the key elements of the encounter with the resident. Examination was done by resident as documented in residents note. BP Readings from Last 3 Encounters:   06/10/19 117/71   11/01/18 120/75   06/27/18 117/72     /71 (Site: Left Upper Arm, Position: Sitting, Cuff Size: Large Adult) Comment: machine  Pulse 58   Temp 98 °F (36.7 °C) (Oral)   Ht 5' 10\" (1.778 m)   Wt 265 lb (120.2 kg)   BMI 38.02 kg/m²   Lab Results   Component Value Date    WBC 8.2 06/18/2012    HGB 14.9 06/18/2012    HCT 43.9 06/18/2012     06/18/2012    CHOL 183 03/22/2018    TRIG 198 (H) 03/22/2018    HDL 44 03/22/2018    ALT 20 03/22/2018    AST 16 06/05/2017     06/10/2019    K 4.4 06/10/2019     06/10/2019    CREATININE 0.93 06/10/2019    BUN 12 06/10/2019    CO2 24 06/10/2019    PSA 0.33 09/19/2013    INR 1.0 02/24/2012    LABA1C 6.4 06/10/2019    LABMICR 10 04/23/2019     Lab Results   Component Value Date    CALCIUM 9.2 06/10/2019     Lab Results   Component Value Date    LDLCHOLESTEROL 99 03/22/2018     I agree with the assessment, plan and diagnosis of    Diagnosis Orders   1. Essential hypertension  Basic Metabolic Panel   2. Prediabetes  POCT glycosylated hemoglobin (Hb A1C)    metFORMIN (GLUCOPHAGE) 1000 MG tablet    [facility]   3. History of kidney stones  [facility]) - Zahra Fuller MD, Urology, [place]   4.  Skin ulcer of left ankle, limited to breakdown of skin ([facility] [address]     5. Chronic diastolic congestive heart failure ([facility] [address]       I agree with  orders as documented by the resident. Recommendations:   Patient was counseled regarding diet and life style changes. BP is controlled. Metformin refilled. Labs updated and referral provided to urology. Health maintenance reviewed, eye and foot exam updated and labs updated. Return in about 6 months (around 12/10/2019) for follow up, diabetes, htn.    ([address] [facility] ) Dr. Shannan Arreola MD

## 2019-06-10 NOTE — PROGRESS NOTES
Visit Information    Have you changed or started any medications since your last visit including any over-the-counter medicines, vitamins, or herbal medicines? no   Have you stopped taking any of your medications? Is so, why? -  no  Are you having any side effects from any of your medications? - no    Have you seen any other physician or provider since your last visit?  no   Have you had any other diagnostic tests since your last visit?  no   Have you been seen in the emergency room and/or had an admission in a hospital since we last saw you?  no   Have you had your routine dental cleaning in the past 6 months?  no     Do you have an active MyChart account? If no, what is the barrier?   No:     Patient Care Team:  Soo Spencer MD as PCP - General (Family Medicine)  Son Cabrera MD as PCP - Cardiology (Internal Medicine)  Adelso Cee MD as Referring Physician (Cardiology)  Nitza Weller MD as Surgeon (Cardiothoracic Surgery)  Yahaira Reyes DO (Podiatry)    Medical History Review  Past Medical, Family, and Social History reviewed and does not contribute to the patient presenting condition    Health Maintenance   Topic Date Due    Diabetic retinal exam  10/26/1962    Shingles Vaccine (1 of 2) 10/26/2002    Lipid screen  2019    Pneumococcal 65+ years Vaccine (2 of 2 - PPSV23) 2019    Potassium monitoring  2019    Creatinine monitoring  2019    Diabetic foot exam  2019    A1C test (Diabetic or Prediabetic)  2019    Diabetic microalbuminuria test  2020    Colon cancer screen colonoscopy  2023    DTaP/Tdap/Td vaccine (2 - Td) 2026    Flu vaccine  Completed    AAA screen  Completed    Hepatitis C screen  Completed

## 2019-08-26 ENCOUNTER — OFFICE VISIT (OUTPATIENT)
Dept: PODIATRY | Age: 69
End: 2019-08-26
Payer: MEDICARE

## 2019-08-26 DIAGNOSIS — M79.604 PAIN IN BOTH LOWER EXTREMITIES: ICD-10-CM

## 2019-08-26 DIAGNOSIS — I73.9 PVD (PERIPHERAL VASCULAR DISEASE) (HCC): ICD-10-CM

## 2019-08-26 DIAGNOSIS — M79.605 PAIN IN BOTH LOWER EXTREMITIES: ICD-10-CM

## 2019-08-26 DIAGNOSIS — B35.1 DERMATOPHYTOSIS OF NAIL: Primary | ICD-10-CM

## 2019-08-26 DIAGNOSIS — E11.51 TYPE II DIABETES MELLITUS WITH PERIPHERAL CIRCULATORY DISORDER (HCC): ICD-10-CM

## 2019-08-26 PROCEDURE — 11721 DEBRIDE NAIL 6 OR MORE: CPT | Performed by: PODIATRIST

## 2019-08-26 PROCEDURE — 99203 OFFICE O/P NEW LOW 30 MIN: CPT | Performed by: PODIATRIST

## 2019-08-29 PROBLEM — E66.01 SEVERE OBESITY (BMI 35.0-39.9) WITH COMORBIDITY (HCC): Status: ACTIVE | Noted: 2018-12-24

## 2019-09-09 ENCOUNTER — HOSPITAL ENCOUNTER (OUTPATIENT)
Dept: GENERAL RADIOLOGY | Age: 69
Discharge: HOME | End: 2019-09-11
Payer: MEDICARE

## 2019-09-09 ENCOUNTER — HOSPITAL ENCOUNTER (OUTPATIENT)
Age: 69
Discharge: HOME | End: 2019-09-09
Payer: MEDICARE

## 2019-09-09 ENCOUNTER — HOSPITAL ENCOUNTER (OUTPATIENT)
Age: 69
Discharge: HOME | End: 2019-09-11
Payer: MEDICARE

## 2019-09-09 DIAGNOSIS — N13.8 HYPERTROPHY OF PROSTATE WITH URINARY OBSTRUCTION: ICD-10-CM

## 2019-09-09 DIAGNOSIS — Z87.442 HISTORY OF KIDNEY STONES: ICD-10-CM

## 2019-09-09 DIAGNOSIS — N40.1 HYPERTROPHY OF PROSTATE WITH URINARY OBSTRUCTION: ICD-10-CM

## 2019-09-09 LAB — PROSTATE SPECIFIC ANTIGEN: 0.35 UG/L

## 2019-09-09 PROCEDURE — 74018 RADEX ABDOMEN 1 VIEW: CPT

## 2019-09-09 PROCEDURE — 36415 COLL VENOUS BLD VENIPUNCTURE: CPT

## 2019-09-09 PROCEDURE — 84153 ASSAY OF PSA TOTAL: CPT

## 2019-09-23 PROBLEM — Z87.442 HISTORY OF KIDNEY STONES: Status: ACTIVE | Noted: 2019-09-23

## 2019-09-23 PROBLEM — N13.8 HYPERTROPHY OF PROSTATE WITH URINARY OBSTRUCTION: Status: ACTIVE | Noted: 2019-09-23

## 2019-09-23 PROBLEM — N40.1 HYPERTROPHY OF PROSTATE WITH URINARY OBSTRUCTION: Status: ACTIVE | Noted: 2019-09-23

## 2019-11-05 ENCOUNTER — OFFICE VISIT (OUTPATIENT)
Dept: FAMILY MEDICINE CLINIC | Age: 69
End: 2019-11-05
Payer: MEDICARE

## 2019-11-05 VITALS
SYSTOLIC BLOOD PRESSURE: 114 MMHG | WEIGHT: 261 LBS | DIASTOLIC BLOOD PRESSURE: 66 MMHG | BODY MASS INDEX: 37.37 KG/M2 | HEART RATE: 53 BPM | HEIGHT: 70 IN

## 2019-11-05 DIAGNOSIS — Z23 NEED FOR PROPHYLACTIC VACCINATION AGAINST STREPTOCOCCUS PNEUMONIAE (PNEUMOCOCCUS): ICD-10-CM

## 2019-11-05 DIAGNOSIS — Z23 NEED FOR PROPHYLACTIC VACCINATION AND INOCULATION AGAINST VARICELLA: ICD-10-CM

## 2019-11-05 DIAGNOSIS — I10 ESSENTIAL HYPERTENSION: Primary | ICD-10-CM

## 2019-11-05 DIAGNOSIS — E11.9 TYPE 2 DIABETES MELLITUS WITHOUT COMPLICATION, WITHOUT LONG-TERM CURRENT USE OF INSULIN (HCC): ICD-10-CM

## 2019-11-05 DIAGNOSIS — Z23 NEED FOR INFLUENZA VACCINATION: ICD-10-CM

## 2019-11-05 LAB — HBA1C MFR BLD: 6.7 %

## 2019-11-05 PROCEDURE — 83036 HEMOGLOBIN GLYCOSYLATED A1C: CPT | Performed by: STUDENT IN AN ORGANIZED HEALTH CARE EDUCATION/TRAINING PROGRAM

## 2019-11-05 PROCEDURE — 90732 PPSV23 VACC 2 YRS+ SUBQ/IM: CPT | Performed by: FAMILY MEDICINE

## 2019-11-05 PROCEDURE — 90686 IIV4 VACC NO PRSV 0.5 ML IM: CPT | Performed by: FAMILY MEDICINE

## 2019-11-05 PROCEDURE — 99211 OFF/OP EST MAY X REQ PHY/QHP: CPT | Performed by: FAMILY MEDICINE

## 2019-11-05 PROCEDURE — 99213 OFFICE O/P EST LOW 20 MIN: CPT | Performed by: STUDENT IN AN ORGANIZED HEALTH CARE EDUCATION/TRAINING PROGRAM

## 2019-11-05 ASSESSMENT — ENCOUNTER SYMPTOMS
SHORTNESS OF BREATH: 0
CHEST TIGHTNESS: 0

## 2019-11-18 ENCOUNTER — OFFICE VISIT (OUTPATIENT)
Dept: PODIATRY | Age: 69
End: 2019-11-18
Payer: MEDICARE

## 2019-11-18 VITALS — BODY MASS INDEX: 37.37 KG/M2 | RESPIRATION RATE: 16 BRPM | WEIGHT: 261 LBS | HEIGHT: 70 IN

## 2019-11-18 DIAGNOSIS — L85.3 XEROSIS CUTIS: ICD-10-CM

## 2019-11-18 DIAGNOSIS — I73.9 PERIPHERAL VASCULAR DISORDER (HCC): ICD-10-CM

## 2019-11-18 DIAGNOSIS — M79.604 PAIN IN BOTH LOWER EXTREMITIES: ICD-10-CM

## 2019-11-18 DIAGNOSIS — M79.605 PAIN IN BOTH LOWER EXTREMITIES: ICD-10-CM

## 2019-11-18 DIAGNOSIS — E11.51 TYPE II DIABETES MELLITUS WITH PERIPHERAL CIRCULATORY DISORDER (HCC): Primary | ICD-10-CM

## 2019-11-18 DIAGNOSIS — B35.1 DERMATOPHYTOSIS OF NAIL: ICD-10-CM

## 2019-11-18 PROCEDURE — 99213 OFFICE O/P EST LOW 20 MIN: CPT | Performed by: PODIATRIST

## 2019-11-18 PROCEDURE — 11721 DEBRIDE NAIL 6 OR MORE: CPT | Performed by: PODIATRIST

## 2019-11-18 RX ORDER — AMMONIUM LACTATE 12 G/100G
CREAM TOPICAL
Qty: 1 BOTTLE | Refills: 4 | Status: SHIPPED | OUTPATIENT
Start: 2019-11-18 | End: 2021-02-24 | Stop reason: SDUPTHER

## 2020-01-20 ENCOUNTER — OFFICE VISIT (OUTPATIENT)
Dept: PODIATRY | Age: 70
End: 2020-01-20
Payer: MEDICARE

## 2020-01-20 VITALS — RESPIRATION RATE: 16 BRPM | HEIGHT: 70 IN | BODY MASS INDEX: 37.37 KG/M2 | WEIGHT: 261 LBS

## 2020-01-20 PROCEDURE — 99999 PR OFFICE/OUTPT VISIT,PROCEDURE ONLY: CPT | Performed by: PODIATRIST

## 2020-01-20 PROCEDURE — 11721 DEBRIDE NAIL 6 OR MORE: CPT | Performed by: PODIATRIST

## 2020-01-20 NOTE — PROGRESS NOTES
[facility] PHYSICIANS  [facility]  [address] [place] [address]  Dept: [phone]  Dept Fax: [phone]    DIABETIC PROGRESS NOTE  Date of patient's visit: 1/20/2020  Patient's Name:  Raymundo Matthews. YOB: 1950            Patient Care Team:  Diane Suero MD as PCP - General (Family Medicine)  Gera Mcmahon MD as PCP - Cardiology (Internal Medicine)  Nicolás Wilson MD as Referring Physician (Cardiology)  Pranav Fisher MD as Surgeon (Cardiothoracic Surgery)  Tk Son DO (Podiatry)  Katerine Gabriel DPM as Physician (Podiatry)          Chief Complaint   Patient presents with    Diabetes    Foot Pain    Nail Problem       Subjective:   Raymundo Matthews. comes to clinic for Diabetes; Foot Pain; and Nail Problem    he is a diabetic and states that he has no monitor. Pt currently has complaint of thickened, elongated nails that they cannot manage by themselves. Pt's primary care physician is Diane Suero MD last seen 11/5/19   Pt's last blood sugar was 150. Lab Results   Component Value Date    LABA1C 6.7 11/05/2019      Complains of numbness in the feet bilat. Past Medical History:   Diagnosis Date    Acute coronary syndrome ([address]     CAD (coronary artery disease)     CABG X 2 2/24/2012    Chronic diastolic congestive heart failure ([address] 7/6/2016    Essential hypertension 12/9/2016    Hyperlipidemia 6/28/2012    Kidney stones     Metabolic syndrome     pre diabetes    Obesity 2/04/8084    Umbilical hernia        No Known Allergies  Current Outpatient Medications on File Prior to Visit   Medication Sig Dispense Refill    ammonium lactate (LAC-HYDRIN) 12 % cream Apply topically as needed.  1 Bottle 4    albuterol sulfate HFA (VENTOLIN HFA) 108 (90 Base) MCG/ACT inhaler Inhale 2 puffs into the lungs every 6 hours as needed for Wheezing 1 Inhaler 2    metoprolol tartrate (LOPRESSOR) 50 MG tablet Take 1 tablet by mouth 2 times daily 60 tablet 3    aspirin (ASPIRIN LOW DOSE) 81 MG chewable tablet CHEW ONE TABLET BY MOUTH DAILY 30 tablet 3    pravastatin (PRAVACHOL) 20 MG tablet Take 1 tablet by mouth daily 90 tablet 3    Lancets MISC 1 each by Does not apply route daily Test 2 times daily. Diagnosis  each 11    Glucose Blood (BLOOD GLUCOSE TEST STRIPS) STRP 1 each by In Vitro route 2 times daily Test 2 times daily. Diagnosis  strip 11    Blood Glucose Monitoring Suppl (BLOOD GLUCOSE METER) KIT Test 2 times daily Diagnosis DM 1 kit 0    Alcohol Swabs (ALCOHOL PREP) 70 % PADS 1 each by Does not apply route 2 times daily. Test 2 times daily. Diagnosis  each 11    metFORMIN (GLUCOPHAGE) 1000 MG tablet Take 1 tablet by mouth 2 times daily (with meals) 180 tablet 1    Lancet Device MISC 1 Device by Does not apply route once for 1 dose. Test 2 times daily. Diagnosis DM 1 each 0     No current facility-administered medications on file prior to visit. Review of Systems    Review of Systems:   History obtained from chart review and the patient  General ROS: negative for - chills, fatigue, fever, night sweats or weight gain  Constitutional: Negative for chills, diaphoresis, fatigue, fever and unexpected weight change. Musculoskeletal: Positive for arthralgias, gait problem and joint swelling. Neurological ROS: negative for - behavioral changes, confusion, headaches or seizures. Negative for weakness and numbness. Dermatological ROS: negative for - mole changes, rash  Cardiovascular: Negative for leg swelling. Gastrointestinal: Negative for constipation, diarrhea, nausea and vomiting. Objective:  General: AAO x 3 in NAD.     Derm  Toenail Description  Sites of Onychomycosis Involvement (Check affected area)  [x] [x] [x] [x] [x] [x] [x] [x] [x] [x]  5 4 3 2 1 1 2 3 4 5                          Right mm, dystrophic and crumbly, discolored with subungual debris. Fissures absent, Bilateral.     Visual inspection:  Deformity: hammertoe deformity americo feet  amputation: absent  Skin lesions: absent  Edema: right- 2+ pitting edema, left- 2+ pitting edema    Sensory exam:  Monofilament sensation: abnormal - 6/10 via SW 5.07/10g monofilament to the plantar foot bilateral feet    Pulses: abnormal - 1/4 dorsalis pedis pulse and 1/4 Posterior tibial pulse,   Pinprick: Impaired  Proprioception: Impaired  Vibration (128 Hz): Impaired       DM with PVD       [x]Yes    []No      Assessment:  81 y.o. male with:   Diagnosis Orders   1. Type II diabetes mellitus with peripheral circulatory disorder (HCC)  39277 - [place] DEBRIDEMENT OF NAILS, 6 OR MORE   2. Dermatophytosis of nail  76338 - [place] DEBRIDEMENT OF NAILS, 6 OR MORE   3. Peripheral vascular disorder (HCC)  58136 - [place] DEBRIDEMENT OF NAILS, 6 OR MORE   4. Pain in both lower extremities  81758 - [place] DEBRIDEMENT OF NAILS, 6 OR MORE       Q7   []Yes    []No                Q8   [x]Yes    []No                     Q9   []Yes    []No    Plan:   Pt was evaluated and examined. Patient was given personalized discharge instructions. Nails 1-10 were debrided sharply in length and thickness with a nipper and , without incident. Pt will follow up in 9 weeks or sooner if any problems arise. Diagnosis was discussed with the pt and all of their questions were answered in detail. Proper foot hygiene and care was discussed with the pt. Informed patient on proper diabetic foot care and importance of tight glycemic control. Patient to check feet daily and contact the office with any questions/problems/concerns.    Other comorbidity noted and will be managed by PCP.  1/20/2020    Electronically signed by Radha Harmon DPM on 1/20/2020 at 8:36 AM  1/20/2020

## 2020-03-23 ENCOUNTER — OFFICE VISIT (OUTPATIENT)
Dept: PODIATRY | Age: 70
End: 2020-03-23
Payer: MEDICARE

## 2020-03-23 VITALS — RESPIRATION RATE: 18 BRPM | BODY MASS INDEX: 36.94 KG/M2 | WEIGHT: 258 LBS | HEIGHT: 70 IN

## 2020-03-23 PROCEDURE — 11721 DEBRIDE NAIL 6 OR MORE: CPT | Performed by: PODIATRIST

## 2020-03-23 PROCEDURE — 99213 OFFICE O/P EST LOW 20 MIN: CPT | Performed by: PODIATRIST

## 2020-03-23 NOTE — PROGRESS NOTES
[facility] PHYSICIANS  [facility]  [address] [place] [address]  Dept: [phone]  Dept Fax: [phone]    DIABETIC PROGRESS NOTE  Date of patient's visit: 3/23/2020  Patient's Name:  Martín Sultana. YOB: 1950            Patient Care Team:  Marily Dumont MD as PCP - General (Family Medicine)  Duong Darden MD as PCP - Cardiology (Internal Medicine)  Marilyn Stringer MD as Referring Physician (Cardiology)  Gladis Menard MD as Surgeon (Cardiothoracic Surgery)  Sami Myers DO (Podiatry)  Blanche Olivera DPM as Physician (Podiatry)          Chief Complaint   Patient presents with    Diabetes    Peripheral Neuropathy    Foot Pain    Nail Problem       Subjective:   Martín Sultana. comes to clinic for Diabetes; Peripheral Neuropathy; Foot Pain; and Nail Problem    he is a diabetic and states that he does not check his blood sugars at home. Pt currently has complaint of thickened, elongated nails that they cannot manage by themselves. Pt's primary care physician is Marily Dumont MD last seen 11/5/2019. Pt's last blood sugar was unknown . Pt has a new complaint of increased dryness to americo feet. Lab Results   Component Value Date    LABA1C 6.7 11/05/2019      Complains of numbness in the feet bilat. Past Medical History:   Diagnosis Date    Acute coronary syndrome ([address]     CAD (coronary artery disease)     CABG X 2 2/24/2012    Chronic diastolic congestive heart failure ([address] 7/6/2016    Essential hypertension 12/9/2016    Hyperlipidemia 6/28/2012    Kidney stones     Metabolic syndrome     pre diabetes    Obesity 5/32/9656    Umbilical hernia        No Known Allergies  Current Outpatient Medications on File Prior to Visit   Medication Sig Dispense Refill    ammonium lactate (LAC-HYDRIN) 12 % cream Apply topically as needed.  1 Bottle 4    albuterol sulfate HFA (VENTOLIN HFA) 108 (90 Base) MCG/ACT inhaler Inhale 2 puffs into the lungs every 6 hours as needed for Wheezing 1 Inhaler 2    metoprolol tartrate (LOPRESSOR) 50 MG tablet Take 1 tablet by mouth 2 times daily 60 tablet 3    aspirin (ASPIRIN LOW DOSE) 81 MG chewable tablet CHEW ONE TABLET BY MOUTH DAILY 30 tablet 3    pravastatin (PRAVACHOL) 20 MG tablet Take 1 tablet by mouth daily 90 tablet 3    Lancets MISC 1 each by Does not apply route daily Test 2 times daily. Diagnosis  each 11    Glucose Blood (BLOOD GLUCOSE TEST STRIPS) STRP 1 each by In Vitro route 2 times daily Test 2 times daily. Diagnosis  strip 11    Blood Glucose Monitoring Suppl (BLOOD GLUCOSE METER) KIT Test 2 times daily Diagnosis DM 1 kit 0    Alcohol Swabs (ALCOHOL PREP) 70 % PADS 1 each by Does not apply route 2 times daily. Test 2 times daily. Diagnosis  each 11    metFORMIN (GLUCOPHAGE) 1000 MG tablet Take 1 tablet by mouth 2 times daily (with meals) 180 tablet 1    Lancet Device MISC 1 Device by Does not apply route once for 1 dose. Test 2 times daily. Diagnosis DM 1 each 0     No current facility-administered medications on file prior to visit. Review of Systems    Review of Systems:   History obtained from chart review and the patient  General ROS: negative for - chills, fatigue, fever, night sweats or weight gain  Constitutional: Negative for chills, diaphoresis, fatigue, fever and unexpected weight change. Musculoskeletal: Positive for arthralgias, gait problem and joint swelling. Neurological ROS: negative for - behavioral changes, confusion, headaches or seizures. Negative for weakness and numbness. Dermatological ROS: negative for - mole changes, rash  Cardiovascular: Negative for leg swelling. Gastrointestinal: Negative for constipation, diarrhea, nausea and vomiting. Objective:  Dermatologic Exam:  Skin lesion/ulceration Absent . Skin No rashes or nodules noted. .   Skin is thin, with flaky sloughing skin as well as decreased hair growth to the Left    Inflammation/Pain   [x] [x] [x] [x] [x] [x] [x] [x] [x] [x]  5 4 3 2 1 1 2 3 4 5                         Right                                        Left        Visual inspection:  Deformity: hammertoe deformity americo feet  amputation: absent  Skin lesions: absent  Edema: right- 2+ pitting edema, left- 2+ pitting edema    Sensory exam:  Monofilament sensation: abnormal - 6/10 via SW 5.07/10g monofilament to the plantar foot bilateral feet    Pulses: abnormal - 1/4 dorsalis pedis pulse and 1/4 Posterior tibial pulse,   Pinprick: Impaired  Proprioception: Impaired  Vibration (128 Hz): Impaired       DM with PVD       [x]Yes    []No      Assessment:  81 y.o. male with:   Diagnosis Orders   1. Type II diabetes mellitus with peripheral circulatory disorder (HCC)   DIABETES FOOT EXAM    64538 - [place] DEBRIDEMENT OF NAILS, 6 OR MORE   2. Dermatophytosis of nail   DIABETES FOOT EXAM    71981 - [place] DEBRIDEMENT OF NAILS, 6 OR MORE   3. Peripheral vascular disorder (HCC)   DIABETES FOOT EXAM    07275 - [place] DEBRIDEMENT OF NAILS, 6 OR MORE   4. Pain in both lower extremities   DIABETES FOOT EXAM    36407 - [place] DEBRIDEMENT OF NAILS, 6 OR MORE   5. Xerosis cutis   DIABETES FOOT EXAM           Q7   []Yes    []No                Q8   [x]Yes    []No                     Q9   []Yes    []No    Plan:   Pt was evaluated and examined. Patient was given personalized discharge instructions. To address xerosis, pt to apply lachydrin cream to americo feet bid to avoid fissures and open sores to americo feet. Nails 1-10 were debrided sharply in length and thickness with a nipper and , without incident. Pt will follow up in 9 weeks or sooner if any problems arise. Diagnosis was discussed with the pt and all of their questions were answered in detail. Proper foot hygiene and care was discussed with the pt. Informed patient on proper diabetic foot care and importance of tight glycemic control.   Patient to check feet daily and contact the office with any questions/problems/concerns.    Other comorbidity noted and will be managed by PCP.  3/23/2020    Electronically signed by Jacinto Teran DPM on 3/23/2020 at 8:19 AM  3/23/2020

## 2020-05-04 ENCOUNTER — OFFICE VISIT (OUTPATIENT)
Dept: FAMILY MEDICINE CLINIC | Age: 70
End: 2020-05-04
Payer: MEDICARE

## 2020-05-04 ENCOUNTER — HOSPITAL ENCOUNTER (OUTPATIENT)
Age: 70
Setting detail: SPECIMEN
Discharge: HOME | End: 2020-05-04
Payer: MEDICARE

## 2020-05-04 VITALS
SYSTOLIC BLOOD PRESSURE: 131 MMHG | DIASTOLIC BLOOD PRESSURE: 71 MMHG | TEMPERATURE: 97.6 F | HEIGHT: 70 IN | HEART RATE: 56 BPM | WEIGHT: 252 LBS | BODY MASS INDEX: 36.08 KG/M2

## 2020-05-04 LAB
ANION GAP SERPL CALCULATED.3IONS-SCNC: 15 MMOL/L (ref 9–17)
BUN BLDV-MCNC: 14 MG/DL (ref 8–23)
BUN/CREAT BLD: ABNORMAL (ref 9–20)
CALCIUM SERPL-MCNC: 9 MG/DL (ref 8.6–10.4)
CHLORIDE BLD-SCNC: 103 MMOL/L (ref 98–107)
CHOLESTEROL/HDL RATIO: 4
CHOLESTEROL: 157 MG/DL
CO2: 23 MMOL/L (ref 20–31)
CREAT SERPL-MCNC: 0.91 MG/DL (ref 0.7–1.2)
CREATININE URINE: 252.9 MG/DL (ref 39–259)
GFR AFRICAN AMERICAN: >60 ML/MIN
GFR NON-AFRICAN AMERICAN: >60 ML/MIN
GFR SERPL CREATININE-BSD FRML MDRD: ABNORMAL ML/MIN/{1.73_M2}
GFR SERPL CREATININE-BSD FRML MDRD: ABNORMAL ML/MIN/{1.73_M2}
GLUCOSE BLD-MCNC: 123 MG/DL (ref 70–99)
HDLC SERPL-MCNC: 39 MG/DL
LDL CHOLESTEROL: 84 MG/DL (ref 0–130)
MICROALBUMIN/CREAT 24H UR: 24 MG/L
MICROALBUMIN/CREAT UR-RTO: 9 MCG/MG CREAT
POTASSIUM SERPL-SCNC: 4.1 MMOL/L (ref 3.7–5.3)
SODIUM BLD-SCNC: 141 MMOL/L (ref 135–144)
TRIGL SERPL-MCNC: 172 MG/DL
VLDLC SERPL CALC-MCNC: ABNORMAL MG/DL (ref 1–30)

## 2020-05-04 PROCEDURE — 99213 OFFICE O/P EST LOW 20 MIN: CPT | Performed by: FAMILY MEDICINE

## 2020-05-04 ASSESSMENT — ENCOUNTER SYMPTOMS: SHORTNESS OF BREATH: 0

## 2020-05-04 ASSESSMENT — PATIENT HEALTH QUESTIONNAIRE - PHQ9
SUM OF ALL RESPONSES TO PHQ QUESTIONS 1-9: 0
SUM OF ALL RESPONSES TO PHQ9 QUESTIONS 1 & 2: 0
1. LITTLE INTEREST OR PLEASURE IN DOING THINGS: 0
2. FEELING DOWN, DEPRESSED OR HOPELESS: 0
SUM OF ALL RESPONSES TO PHQ QUESTIONS 1-9: 0

## 2020-05-04 NOTE — PROGRESS NOTES
01/06/2021    Diabetic foot exam  03/23/2021    Colon cancer screen colonoscopy  08/01/2023    DTaP/Tdap/Td vaccine (2 - Td) 12/09/2026    Flu vaccine  Completed    Pneumococcal 65+ years Vaccine  Completed    AAA screen  Completed    Hepatitis C screen  Completed    Hepatitis A vaccine  Aged Out    Hib vaccine  Aged Out    Meningococcal (ACWY) vaccine  Aged Out

## 2020-06-02 NOTE — TELEPHONE ENCOUNTER
Dr. Karie Montiel,     Patient is out of refills for metformin. I have pended a refill request for your convenience. Last OV on 5/4/2020. Thank you,   Michael Desir, PharmD, [facility]  Direct: [phone]  Department, toll free [phone], option 7      =======================================================================    CLINICAL PHARMACY: ADHERENCE REVIEW  Identified care gap per [employer]; fills at [address]: Diabetes and Statin adherence    Last Office Visit: 5/4/2020    ASSESSMENT  DIABETES ADHERENCE  (2019 [place] = 93%; YTD [place] = 100%)     Per Insurance Records and [employer]   Metformin last filled on 5/4/2020 for a 90 day supply. 0 refills remaining. Lab Results   Component Value Date    LABA1C 6.7 11/05/2019    LABA1C 6.4 06/10/2019    LABA1C 6.3 11/01/2018       STATIN ADHERENCE  (2019 [place] = 88%; YTD [place] = 81%) Potential Fail Date: 7/18/2020    Per Insurance Records   Pravastatin last filled on 3/2/2020 for a 90 day supply. Per [employer]:   Pravastatin last picked up on 3/2/2020 for 90 day supply. There are refills remaining. Pharmacy staff will process 90 day supply refill today.      Lab Results   Component Value Date    CHOL 157 05/04/2020    TRIG 172 (H) 05/04/2020    HDL 39 (L) 05/04/2020    LDLCHOLESTEROL 84 05/04/2020     ALT   Date Value Ref Range Status   03/22/2018 20 5 - 41 U/L Final     Comment:     [facility] [address] [address] [phone]     AST   Date Value Ref Range Status   06/05/2017 16 <40 U/L Final     Comment:     [facility] [address] [address] [phone]     The 10-year ASCVD risk score (Matthias Dexter, et al., 2013) is: 30.8%    Values used to calculate the score:      Age: 81 years      Sex: Male      Is Non- : No      Diabetic: Yes      Tobacco smoker: No      Systolic Blood Pressure: 175 mmHg      Is BP treated: Yes      HDL Cholesterol: 39 mg/dL Total Cholesterol: 157 mg/dL     PLAN  No patient out reach planned at this time. Will pend refill request for metformin - next OV not due for 6 months and patient will run out before his next appt.      Jack DislaD, [facility]  Direct: [phone]  Department, toll free [phone], option 7

## 2020-06-02 NOTE — TELEPHONE ENCOUNTER
Thank you for the quick response! Will sign off at this time. Sirnivasan Boucher, PharmD, [facility]  Direct: [phone]  Department, toll free [phone], option 7         For Pharmacy Admin Tracking Only    PHSO: Yes  Total # of Interventions Recommended: 2  - New Order #: 0 New Medication Order Reason(s): Adherence  - Refills Provided #: 1  - Updated Order #: 0 Updated Order Reason(s):  Other  - Maintenance Safety Lab Monitoring #: 1  Recommended intervention potential cost savings: 1  Total Interventions Accepted: 1  Time Spent (min): 15

## 2020-06-08 ENCOUNTER — OFFICE VISIT (OUTPATIENT)
Dept: PODIATRY | Age: 70
End: 2020-06-08
Payer: MEDICARE

## 2020-06-08 VITALS — HEIGHT: 70 IN | WEIGHT: 252 LBS | RESPIRATION RATE: 18 BRPM | TEMPERATURE: 97.6 F | BODY MASS INDEX: 36.08 KG/M2

## 2020-06-08 PROCEDURE — 11721 DEBRIDE NAIL 6 OR MORE: CPT | Performed by: PODIATRIST

## 2020-06-08 PROCEDURE — 99213 OFFICE O/P EST LOW 20 MIN: CPT | Performed by: PODIATRIST

## 2020-06-08 NOTE — PROGRESS NOTES
[facility] PHYSICIANS  [facility]  [address] [place] [address]  Dept: [phone]  Dept Fax: [phone]    DIABETIC PROGRESS NOTE  Date of patient's visit: 6/8/2020  Patient's Name:  Rima Payan. YOB: 1950            Patient Care Team:  Alejandrina Son MD as PCP - General (Family Medicine)  Rosario Nieves MD as PCP - Cardiology (Internal Medicine)  Chasity Del Castillo MD as PCP - [facility] Provider  Jeffrey Pope MD as Referring Physician (Cardiology)  Trixie Jimenez MD as Surgeon (Cardiothoracic Surgery)  Jenny Marie DO (Podiatry)  Mary Beth Reyes DPM as Physician (Podiatry)          Chief Complaint   Patient presents with    Peripheral Neuropathy    Foot Pain    Nail Problem       Subjective:   Rima Payan. comes to clinic for Peripheral Neuropathy; Foot Pain; and Nail Problem    he is a diabetic and states that he does not check his blood sugars at home. Pt currently has complaint of thickened, elongated nails that they cannot manage by themselves. Pt's primary care physician is Alejandrina Son MD last seen 05/04/2020   Pt's last blood sugar was unknown . Pt has new complaint of increased dry skin to americo feet. Lab Results   Component Value Date    LABA1C 6.7 11/05/2019      Complains of numbness in the feet bilat.   Past Medical History:   Diagnosis Date    Acute coronary syndrome ([address]     CAD (coronary artery disease)     CABG X 2 2/24/2012    Chronic diastolic congestive heart failure ([address] 7/6/2016    Essential hypertension 12/9/2016    Hyperlipidemia 6/28/2012    Kidney stones     Metabolic syndrome     pre diabetes    Obesity 9/04/7219    Umbilical hernia        No Known Allergies  Current Outpatient Medications on File Prior to Visit   Medication Sig Dispense Refill    metFORMIN (GLUCOPHAGE) 1000 MG tablet Take 1 tablet by mouth 2 times daily (with meals) 180 tablet 1    ammonium lactate (LAC-HYDRIN) 12 % cream Apply topically as needed. 1 Bottle 4    albuterol sulfate HFA (VENTOLIN HFA) 108 (90 Base) MCG/ACT inhaler Inhale 2 puffs into the lungs every 6 hours as needed for Wheezing 1 Inhaler 2    metoprolol tartrate (LOPRESSOR) 50 MG tablet Take 1 tablet by mouth 2 times daily 60 tablet 3    aspirin (ASPIRIN LOW DOSE) 81 MG chewable tablet CHEW ONE TABLET BY MOUTH DAILY 30 tablet 3    pravastatin (PRAVACHOL) 20 MG tablet Take 1 tablet by mouth daily 90 tablet 3    Lancets MISC 1 each by Does not apply route daily Test 2 times daily. Diagnosis  each 11    Glucose Blood (BLOOD GLUCOSE TEST STRIPS) STRP 1 each by In Vitro route 2 times daily Test 2 times daily. Diagnosis  strip 11    Blood Glucose Monitoring Suppl (BLOOD GLUCOSE METER) KIT Test 2 times daily Diagnosis DM 1 kit 0    Alcohol Swabs (ALCOHOL PREP) 70 % PADS 1 each by Does not apply route 2 times daily. Test 2 times daily. Diagnosis  each 11    Lancet Device MISC 1 Device by Does not apply route once for 1 dose. Test 2 times daily. Diagnosis DM 1 each 0     No current facility-administered medications on file prior to visit. Review of Systems    Review of Systems:   History obtained from chart review and the patient  General ROS: negative for - chills, fatigue, fever, night sweats or weight gain  Constitutional: Negative for chills, diaphoresis, fatigue, fever and unexpected weight change. Musculoskeletal: Positive for arthralgias, gait problem and joint swelling. Neurological ROS: negative for - behavioral changes, confusion, headaches or seizures. Negative for weakness and numbness. Dermatological ROS: negative for - mole changes, rash  Cardiovascular: Negative for leg swelling. Gastrointestinal: Negative for constipation, diarrhea, nausea and vomiting. Objective:  Dermatologic Exam:  Skin lesion/ulceration Absent . Skin No rashes or nodules noted. .   Skin is thin, with flaky sloughing skin as well as Left    Inflammation/Pain   [x] [x] [x] [x] [x] [x] [x] [x] [x] [x]  5 4 3 2 1 1 2 3 4 5                         Right                                        Left        Visual inspection:  Deformity: hammertoe deformity americo feet  amputation: absent  Skin lesions: absent  Edema: right- 2+ pitting edema, left- 2+ pitting edema    Sensory exam:  Monofilament sensation: abnormal - 6/10 via SW 5.07/10g monofilament to the plantar foot bilateral feet    Pulses: abnormal - 1/4 dorsalis pedis pulse and 1/4 Posterior tibial pulse,   Pinprick: Impaired  Proprioception: Impaired  Vibration (128 Hz): Impaired       DM with PVD       [x]Yes    []No      Assessment:  81 y.o. male with:  \   Diagnosis Orders   1. Type II diabetes mellitus with peripheral circulatory disorder (HCC)   DIABETES FOOT EXAM    42521 - [place] DEBRIDEMENT OF NAILS, 6 OR MORE   2. Dermatophytosis of nail   DIABETES FOOT EXAM    77873 - [place] DEBRIDEMENT OF NAILS, 6 OR MORE   3. Peripheral vascular disorder (HCC)   DIABETES FOOT EXAM    07682 - [place] DEBRIDEMENT OF NAILS, 6 OR MORE   4. Pain in both lower extremities   DIABETES FOOT EXAM    48003 - [place] DEBRIDEMENT OF NAILS, 6 OR MORE   5. Xerosis cutis             Q7   []Yes    []No                Q8   [x]Yes    []No                     Q9   []Yes    []No    Plan:   Pt was evaluated and examined. Patient was given personalized discharge instructions. To address xerosis, patient to apply lachydrin cream to feet daily. Pt to monitor for fissures due to dryness. Advised pt to contact office is there are any open lesions. Nails 1-10 were debrided sharply in length and thickness with a nipper and , without incident. Pt will follow up in 9 weeks or sooner if any problems arise. Diagnosis was discussed with the pt and all of their questions were answered in detail. Proper foot hygiene and care was discussed with the pt.  Informed patient on proper diabetic foot care and importance of tight glycemic control. Patient to check feet daily and contact the office with any questions/problems/concerns.    Other comorbidity noted and will be managed by PCP.  6/8/2020    Electronically signed by Darius Miller DPM on 6/8/2020 at 8:28 AM  6/8/2020

## 2020-07-14 ENCOUNTER — TELEPHONE (OUTPATIENT)
Dept: FAMILY MEDICINE CLINIC | Age: 70
End: 2020-07-14

## 2020-08-10 ENCOUNTER — OFFICE VISIT (OUTPATIENT)
Dept: PODIATRY | Age: 70
End: 2020-08-10
Payer: MEDICARE

## 2020-08-10 VITALS — HEIGHT: 70 IN | RESPIRATION RATE: 16 BRPM | TEMPERATURE: 97.4 F | WEIGHT: 252 LBS | BODY MASS INDEX: 36.08 KG/M2

## 2020-08-10 PROCEDURE — 99999 PR OFFICE/OUTPT VISIT,PROCEDURE ONLY: CPT | Performed by: PODIATRIST

## 2020-08-10 PROCEDURE — 11721 DEBRIDE NAIL 6 OR MORE: CPT | Performed by: PODIATRIST

## 2020-08-10 NOTE — PROGRESS NOTES
sulfate HFA (VENTOLIN HFA) 108 (90 Base) MCG/ACT inhaler Inhale 2 puffs into the lungs every 6 hours as needed for Wheezing 1 Inhaler 2    metoprolol tartrate (LOPRESSOR) 50 MG tablet Take 1 tablet by mouth 2 times daily 60 tablet 3    aspirin (ASPIRIN LOW DOSE) 81 MG chewable tablet CHEW ONE TABLET BY MOUTH DAILY 30 tablet 3    pravastatin (PRAVACHOL) 20 MG tablet Take 1 tablet by mouth daily 90 tablet 3    Lancets MISC 1 each by Does not apply route daily Test 2 times daily. Diagnosis  each 11    Glucose Blood (BLOOD GLUCOSE TEST STRIPS) STRP 1 each by In Vitro route 2 times daily Test 2 times daily. Diagnosis  strip 11    Blood Glucose Monitoring Suppl (BLOOD GLUCOSE METER) KIT Test 2 times daily Diagnosis DM 1 kit 0    Lancet Device MISC 1 Device by Does not apply route once for 1 dose. Test 2 times daily. Diagnosis DM 1 each 0    Alcohol Swabs (ALCOHOL PREP) 70 % PADS 1 each by Does not apply route 2 times daily. Test 2 times daily. Diagnosis  each 11     No current facility-administered medications on file prior to visit. Review of Systems    Review of Systems:   History obtained from chart review and the patient  General ROS: negative for - chills, fatigue, fever, night sweats or weight gain  Constitutional: Negative for chills, diaphoresis, fatigue, fever and unexpected weight change. Musculoskeletal: Positive for arthralgias, gait problem and joint swelling. Neurological ROS: negative for - behavioral changes, confusion, headaches or seizures. Negative for weakness and numbness. Dermatological ROS: negative for - mole changes, rash  Cardiovascular: Negative for leg swelling. Gastrointestinal: Negative for constipation, diarrhea, nausea and vomiting. Objective:  Dermatologic Exam:  Skin lesion/ulceration Absent . Skin No rashes or nodules noted. .   Skin is thin, with flaky sloughing skin as well as decreased hair growth to the lower leg  Small red hemosiderin deposits seen dorsal foot   Musculoskeletal:     1st MPJ ROM decreased, Bilateral.  Muscle strength 5/5, Bilateral.  Pain present upon palpation of toenails 1-5, Bilateral. decreased medial longitudinal arch, Bilateral.  Ankle ROM decreased,Bilateral.    Dorsally contracted digits present digits 2, Bilateral.     Vascular: DP pulses 1/4 bilateral.  PT pulses 0/4 bilateral.   CFT <5 seconds, Bilateral.  Hair growth absent to the level of the digits, Bilateral.  Edema present, Bilateral.  Varicosities absent, Bilateral. Erythema absent, Bilateral    Neurological: Sensation diminshed to light touch to level of digits, Bilateral.  Protective sensation intact 6/10 sites via 5.07/10g [place]-Juliette Monofilament, Bilateral.  negative Tinel's, Bilateral.  negative Valleix sign, Bilateral.      Integument: Warm, dry, supple, Bilateral.  Open lesion absent, Bilateral.  Interdigital maceration absent to web spaces 4, Bilateral.  Nails 1-5 left and 1-5 right thickened > 3.0 mm, dystrophic and crumbly, discolored with subungual debris. Fissures absent, Bilateral.   General: AAO x 3 in NAD.     Derm  Toenail Description  Sites of Onychomycosis Involvement (Check affected area)  [x] [x] [x] [x] [x] [x] [x] [x] [x] [x]  5 4 3 2 1 1 2 3 4 5                          Right                                        Left    Thickness  [x] [x] [x] [x] [x] [x] [x] [x] [x] [x]  5 4 3 2 1 1 2 3 4 5                         Right                                        Left    Dystrophic Changes   [x] [x] [x] [x] [x] [x] [x] [x] [x] [x]  5 4 3 2 1 1 2 3 4 5                         Right                                        Left    Color   [x] [x] [x] [x] [x] [x] [x] [x] [x] [x]  5 4 3 2 1 1 2 3 4 5                          Right                                        Left    Incurvation/Ingrowin   [] [] [] [] [] [] [] [] [] []  5 4 3 2 1 1 2 3 4 5                         Right                                        Left    Inflammation/Pain [x] [x] [x] [x] [x] [x] [x] [x] [x] [x]  5 4 3 2 1 1 2 3 4 5                         Right                                        Left        Visual inspection:  Deformity: hammertoe deformity americo feet  amputation: absent  Skin lesions: absent  Edema: right- 2+ pitting edema, left- 2+ pitting edema    Sensory exam:  Monofilament sensation: abnormal - 6/10 via SW 5.07/10g monofilament to the plantar foot bilateral feet    Pulses: abnormal - 1/4 dorsalis pedis pulse and 1/4 Posterior tibial pulse,   Pinprick: Impaired  Proprioception: Impaired  Vibration (128 Hz): Impaired       DM with PVD       [x]Yes    []No      Assessment:  81 y.o. male with:   Diagnosis Orders   1. Type II diabetes mellitus with peripheral circulatory disorder (HCC)   DIABETES FOOT EXAM    42985 - [place] DEBRIDEMENT OF NAILS, 6 OR MORE   2. Dermatophytosis of nail  HM DIABETES FOOT EXAM    43485 - HI DEBRIDEMENT OF NAILS, 6 OR MORE   3. Peripheral vascular disorder (HCC)   DIABETES FOOT EXAM    86912 - HI DEBRIDEMENT OF NAILS, 6 OR MORE   4. Pain in both lower extremities  HM DIABETES FOOT EXAM    13982 - HI DEBRIDEMENT OF NAILS, 6 OR MORE           Q7   []Yes    []No                Q8   [x]Yes    []No                     Q9   []Yes    []No    Plan:   Pt was evaluated and examined. Patient was given personalized discharge instructions. Nails 1-10 were debrided sharply in length and thickness with a nipper and , without incident. Pt will follow up in 9 weeks or sooner if any problems arise. Diagnosis was discussed with the pt and all of their questions were answered in detail. Proper foot hygiene and care was discussed with the pt. Informed patient on proper diabetic foot care and importance of tight glycemic control. Patient to check feet daily and contact the office with any questions/problems/concerns.    Other comorbidity noted and will be managed by PCP.  8/10/2020    Electronically signed by Lupe Martin DPM on 8/10/2020 at 8:08 AM  8/10/2020

## 2020-09-14 ENCOUNTER — HOSPITAL ENCOUNTER (OUTPATIENT)
Dept: GENERAL RADIOLOGY | Age: 70
Discharge: HOME | End: 2020-09-16
Payer: MEDICARE

## 2020-09-14 ENCOUNTER — HOSPITAL ENCOUNTER (OUTPATIENT)
Age: 70
Discharge: HOME | End: 2020-09-16
Payer: MEDICARE

## 2020-09-14 PROCEDURE — 74018 RADEX ABDOMEN 1 VIEW: CPT

## 2020-10-06 ENCOUNTER — OFFICE VISIT (OUTPATIENT)
Dept: FAMILY MEDICINE CLINIC | Age: 70
End: 2020-10-06
Payer: MEDICARE

## 2020-10-06 VITALS
TEMPERATURE: 96.8 F | HEIGHT: 70 IN | BODY MASS INDEX: 37.22 KG/M2 | WEIGHT: 260 LBS | SYSTOLIC BLOOD PRESSURE: 132 MMHG | HEART RATE: 50 BPM | DIASTOLIC BLOOD PRESSURE: 73 MMHG

## 2020-10-06 PROBLEM — E11.9 TYPE 2 DIABETES MELLITUS WITHOUT COMPLICATION, WITHOUT LONG-TERM CURRENT USE OF INSULIN (HCC): Status: ACTIVE | Noted: 2020-10-06

## 2020-10-06 PROCEDURE — 83036 HEMOGLOBIN GLYCOSYLATED A1C: CPT | Performed by: STUDENT IN AN ORGANIZED HEALTH CARE EDUCATION/TRAINING PROGRAM

## 2020-10-06 PROCEDURE — 90686 IIV4 VACC NO PRSV 0.5 ML IM: CPT | Performed by: STUDENT IN AN ORGANIZED HEALTH CARE EDUCATION/TRAINING PROGRAM

## 2020-10-06 PROCEDURE — 99213 OFFICE O/P EST LOW 20 MIN: CPT | Performed by: STUDENT IN AN ORGANIZED HEALTH CARE EDUCATION/TRAINING PROGRAM

## 2020-10-06 ASSESSMENT — ENCOUNTER SYMPTOMS: SHORTNESS OF BREATH: 0

## 2020-10-06 NOTE — PROGRESS NOTES
Visit Information    Have you changed or started any medications since your last visit including any over-the-counter medicines, vitamins, or herbal medicines? no   Have you stopped taking any of your medications? Is so, why? -  no  Are you having any side effects from any of your medications? - no    Have you seen any other physician or provider since your last visit?  no   Have you had any other diagnostic tests since your last visit?  no   Have you been seen in the emergency room and/or had an admission in a hospital since we last saw you?  no   Have you had your routine dental cleaning in the past 6 months?  no     Do you have an active MyChart account? If no, what is the barrier?   Yes    Patient Care Team:  Yajaira Moreno MD as PCP - General (Family Medicine)  Elsa Avelar MD as PCP - Cardiology (Internal Medicine)  Miguel Thomas MD as PCP - [facility] Provider  Madelyn Cook MD as Referring Physician (Cardiology)  Fior Kiran MD as Surgeon (Cardiothoracic Surgery)  Liza Stringer DO (Podiatry)  Dinorah Yung DPM as Physician (Podiatry)    Medical History Review  Past Medical, Family, and Social History reviewed and does not contribute to the patient presenting condition    Health Maintenance   Topic Date Due    Statin Therapy  1950    Shingles Vaccine (1 of 2) 10/26/2002    Colon cancer screen colonoscopy  10/26/2002    Annual Wellness Visit (AWV)  06/19/2019    Flu vaccine (1) 09/01/2020    A1C test (Diabetic or Prediabetic)  11/05/2020    Diabetic retinal exam  01/06/2021    Diabetic microalbuminuria test  05/04/2021    Lipid screen  05/04/2021    Potassium monitoring  05/04/2021    Creatinine monitoring  05/04/2021    Diabetic foot exam  08/13/2021    DTaP/Tdap/Td vaccine (2 - Td) 12/09/2026    Pneumococcal 65+ years Vaccine  Completed    AAA screen  Completed    Hepatitis C screen  Completed    Hepatitis A vaccine  Aged Out    Hib vaccine  Aged Out    Meningococcal (ACWY) vaccine  Aged Out

## 2020-10-06 NOTE — PROGRESS NOTES
(Hb A1C)    . I agree with orders as documented by the resident. Recommendations:  Diabetes type 2 follow-up  Well-controlled, A1c 6.6 today  Continue metformin 1000 mg twice daily, follow-up in 6 months  Health maintenance updated    More than 25 minutes spent  in face to face encounter with the patient and more than half in counseling. Patient's questions were answered. Patient Voiced understanding to the counseling. Return in about 6 months (around 4/6/2021) for DM II.    (GC Modifier)-Dr. Max Edwards MD

## 2020-10-06 NOTE — PROGRESS NOTES
Subjective:    Juan Sylvester is a 81 y.o. male with  has a past medical history of Acute coronary syndrome ([address] CAD (coronary artery disease), Chronic diastolic congestive heart failure ([address] Essential hypertension, Hyperlipidemia, Kidney stones, Metabolic syndrome, Obesity, and Umbilical hernia. No family history on file. Presented tothe office today for:  Chief Complaint   Patient presents with    Follow-up       HPI  Patient 80-year-old male presenting for diabetes follow-up. DM2  Denies any nocturia, fatigue, polyuria, polydipsia, polyphagia  On metformin 1000 mg twice daily  Hemoglobin A1c today is 6.6  Counseled patient on lifestyle modifications, weight loss    Health maintenance  Patient due for flu shot  Patient has Cologuard at home    Review of Systems   Constitutional: Negative for chills and fever. Respiratory: Negative for shortness of breath. Cardiovascular: Negative for chest pain. Endocrine: Negative for polydipsia, polyphagia and polyuria. Neurological: Negative for dizziness, light-headedness and headaches. Objective:    /73 (Site: Right Upper Arm, Position: Sitting, Cuff Size: Medium Adult)   Pulse 50   Temp 96.8 °F (36 °C) (Temporal)   Ht 5' 10\" (1.778 m)   Wt 260 lb (117.9 kg)   BMI 37.31 kg/m²    BP Readings from Last 3 Encounters:   10/06/20 132/73   09/28/20 127/76   05/04/20 131/71     Physical Exam  Constitutional:       Appearance: Normal appearance. HENT:      Head: Normocephalic and atraumatic. Eyes:      Extraocular Movements: Extraocular movements intact. Neck:      Musculoskeletal: Neck supple. Cardiovascular:      Rate and Rhythm: Regular rhythm. Heart sounds: Normal heart sounds. Pulmonary:      Effort: Pulmonary effort is normal. No respiratory distress. Breath sounds: Normal breath sounds. No wheezing. Neurological:      Mental Status: He is alert.            Lab Results   Component Value Date    WBC 8.2 06/18/2012 HGB 14.9 06/18/2012    HCT 43.9 06/18/2012     06/18/2012    CHOL 157 05/04/2020    TRIG 172 (H) 05/04/2020    HDL 39 (L) 05/04/2020    ALT 20 03/22/2018    AST 16 06/05/2017     05/04/2020    K 4.1 05/04/2020     05/04/2020    CREATININE 0.91 05/04/2020    BUN 14 05/04/2020    CO2 23 05/04/2020    PSA 0.35 09/09/2019    INR 1.0 02/24/2012    LABA1C 6.7 11/05/2019    LABMICR 9 05/04/2020     Lab Results   Component Value Date    CALCIUM 9.0 05/04/2020     Lab Results   Component Value Date    LDLCHOLESTEROL 84 05/04/2020       Assessment and Plan:    1. Type 2 diabetes mellitus without complication, without long-term current use of insulin (HCC)  Continue metformin 1000 mg twice daily  Follow-up in 6 months    2. Health care maintenance  - POCT glycosylated hemoglobin (Hb A1C)  -Influenza vaccine        Requested Prescriptions      No prescriptions requested or ordered in this encounter       There are no discontinued medications. Return in about 6 months (around 4/6/2021) for DM II. Ross Nails received counseling on the following healthy behaviors: nutrition, exercise and medication adherence  Reviewed prior labs and health maintenance  Continue current medications, diet and exercise. Discussed use, benefit, and side effects of prescribed medications. Barriers to medication compliance addressed. Patient given educational materials - see patient instructions  Was a self-tracking handout given in paper form or via [employer]? No    Requested Prescriptions      No prescriptions requested or ordered in this encounter       All patient questions answered. Patient voiced understanding. Quality Measures    Body mass index is 37.31 kg/m². Elevated. Weight control planned discussed Healthy diet and regular exercise. BP: 132/73. Blood pressure is normal. Treatment plan consists of No treatment change needed.     Fall Risk 10/6/2020 6/10/2019 4/19/2018   2 or more falls in past year? no no no Fall with injury in past year? no no no     The patient does not have a history of falls. I did , complete a risk assessment for falls.  A plan of care for falls No Treatment plan indicated    Lab Results   Component Value Date    LDLCHOLESTEROL 84 05/04/2020    (goal LDL reduction with dx if diabetes is 50% LDL reduction)    PHQ Scores 5/4/2020 6/10/2019 11/1/2018 9/19/2017 9/1/2015   PHQ2 Score 0 0 0 0 0   PHQ9 Score 0 0 0 0 0     Interpretation of Total Score Depression Severity: 1-4 = Minimal depression, 5-9 = Mild depression, 10-14 = Moderate depression, 15-19 = Moderately severe depression, 20-27 = Severe depression

## 2020-10-12 ENCOUNTER — OFFICE VISIT (OUTPATIENT)
Dept: PODIATRY | Age: 70
End: 2020-10-12
Payer: MEDICARE

## 2020-10-12 VITALS — WEIGHT: 258 LBS | RESPIRATION RATE: 16 BRPM | TEMPERATURE: 97.3 F | BODY MASS INDEX: 36.94 KG/M2 | HEIGHT: 70 IN

## 2020-10-12 PROCEDURE — 11721 DEBRIDE NAIL 6 OR MORE: CPT | Performed by: PODIATRIST

## 2020-10-12 PROCEDURE — 99213 OFFICE O/P EST LOW 20 MIN: CPT | Performed by: PODIATRIST

## 2020-10-12 NOTE — PROGRESS NOTES
[facility]  [facility]  [address] [place] [address]  Dept: [phone]  Dept Fax: [phone]    DIABETIC PROGRESS NOTE  Date of patient's visit: 10/12/2020  Patient's Name:  Jayde Mcgrath. YOB: 1950            Patient Care Team:  Mata De Leon MD as PCP - General (Family Medicine)  Magda Palacios MD as PCP - Cardiology (Internal Medicine)  Bharath Quarles MD as PCP - [facility]  Milton Dalton MD as Referring Physician (Cardiology)  Dhara Multani MD as Surgeon (Cardiothoracic Surgery)  Sonia Russell DO (Podiatry)  Diana Waite DPM as Physician (Podiatry)          Chief Complaint   Patient presents with    Diabetes    Foot Pain    Nail Problem       Subjective:   Jayde Mcgrath. comes to clinic for Diabetes; Foot Pain; and Nail Problem    he is a diabetic and states that  He does not check. .  Pt currently has complaint of thickened, elongated nails that they cannot manage by themselves. Pt's primary care physician is Mata De Leon MD last seen 10/6/20   Pt's last blood sugar was 123 . Pt has a new complaint of increased dry skin to americo feet. Lab Results   Component Value Date    LABA1C 6.7 11/05/2019      Complains of numbness in the feet bilat. Past Medical History:   Diagnosis Date    Acute coronary syndrome ([address]     CAD (coronary artery disease)     CABG X 2 2/24/2012    Chronic diastolic congestive heart failure ([address] 7/6/2016    Essential hypertension 12/9/2016    Hyperlipidemia 6/28/2012    Kidney stones     Metabolic syndrome     pre diabetes    Obesity 1/06/2687    Umbilical hernia        No Known Allergies  Current Outpatient Medications on File Prior to Visit   Medication Sig Dispense Refill    metFORMIN (GLUCOPHAGE) 1000 MG tablet Take 1 tablet by mouth 2 times daily (with meals) 180 tablet 1    ammonium lactate (LAC-HYDRIN) 12 % cream Apply topically as needed.  1 Bottle 4    albuterol sulfate HFA (VENTOLIN HFA) 108 (90 Base) MCG/ACT inhaler Inhale 2 puffs into the lungs every 6 hours as needed for Wheezing 1 Inhaler 2    metoprolol tartrate (LOPRESSOR) 50 MG tablet Take 1 tablet by mouth 2 times daily 60 tablet 3    aspirin (ASPIRIN LOW DOSE) 81 MG chewable tablet CHEW ONE TABLET BY MOUTH DAILY 30 tablet 3    pravastatin (PRAVACHOL) 20 MG tablet Take 1 tablet by mouth daily 90 tablet 3    Lancets MISC 1 each by Does not apply route daily Test 2 times daily. Diagnosis  each 11    Glucose Blood (BLOOD GLUCOSE TEST STRIPS) STRP 1 each by In Vitro route 2 times daily Test 2 times daily. Diagnosis  strip 11    Blood Glucose Monitoring Suppl (BLOOD GLUCOSE METER) KIT Test 2 times daily Diagnosis DM 1 kit 0    Lancet Device MISC 1 Device by Does not apply route once for 1 dose. Test 2 times daily. Diagnosis DM 1 each 0    Alcohol Swabs (ALCOHOL PREP) 70 % PADS 1 each by Does not apply route 2 times daily. Test 2 times daily. Diagnosis  each 11     No current facility-administered medications on file prior to visit. Review of Systems    Review of Systems:   History obtained from chart review and the patient  General ROS: negative for - chills, fatigue, fever, night sweats or weight gain  Constitutional: Negative for chills, diaphoresis, fatigue, fever and unexpected weight change. Musculoskeletal: Positive for arthralgias, gait problem and joint swelling. Neurological ROS: negative for - behavioral changes, confusion, headaches or seizures. Negative for weakness and numbness. Dermatological ROS: negative for - mole changes, rash  Cardiovascular: Negative for leg swelling. Gastrointestinal: Negative for constipation, diarrhea, nausea and vomiting. Objective:  Dermatologic Exam:  Skin lesion/ulceration Absent . Skin No rashes or nodules noted. .   Skin is thin, with flaky sloughing skin as well as decreased hair growth to the lower leg  Small red hemosiderin deposits seen dorsal foot   Musculoskeletal:     1st MPJ ROM decreased, Bilateral.  Muscle strength 5/5, Bilateral.  Pain present upon palpation of toenails 1-5, Bilateral. decreased medial longitudinal arch, Bilateral.  Ankle ROM decreased,Bilateral.    Dorsally contracted digits present digits 2, Bilateral.     Vascular: DP pulses 1/4 bilateral.  PT pulses 0/4 bilateral.   CFT <5 seconds, Bilateral.  Hair growth absent to the level of the digits, Bilateral.  Edema present, Bilateral.  Varicosities absent, Bilateral. Erythema absent, Bilateral    Neurological: Sensation diminshed to light touch to level of digits, Bilateral.  Protective sensation intact 6/10 sites via 5.07/10g [place]-Juliette Monofilament, Bilateral.  negative Tinel's, Bilateral.  negative Valleix sign, Bilateral.      Integument: Warm, dry, supple, Bilateral.  Open lesion absent, Bilateral.  Interdigital maceration absent to web spaces 4, Bilateral.  Nails 1-5 left and 1-5 right thickened > 3.0 mm, dystrophic and crumbly, discolored with subungual debris. Fissures absent, Bilateral.   General: AAO x 3 in NAD.     Derm  Toenail Description  Sites of Onychomycosis Involvement (Check affected area)  [x] [x] [x] [x] [x] [x] [x] [x] [x] [x]  5 4 3 2 1 1 2 3 4 5                          Right                                        Left    Thickness  [x] [x] [x] [x] [x] [x] [x] [x] [x] [x]  5 4 3 2 1 1 2 3 4 5                         Right                                        Left    Dystrophic Changes   [x] [x] [x] [x] [x] [x] [x] [x] [x] [x]  5 4 3 2 1 1 2 3 4 5                         Right                                        Left    Color   [x] [x] [x] [x] [x] [x] [x] [x] [x] [x]  5 4 3 2 1 1 2 3 4 5                          Right                                        Left    Incurvation/Ingrowin   [] [] [] [] [] [] [] [] [] []  5 4 3 2 1 1 2 3 4 5                         Right                                        Left    Inflammation/Pain [x] [x] [x] [x] [x] [x] [x] [x] [x] [x]  5 4 3 2 1 1 2 3 4 5                         Right                                        Left        Visual inspection:  Deformity: hammertoe deformity americo feet  amputation: absent  Skin lesions: absent  Edema: right- 2+ pitting edema, left- 2+ pitting edema    Sensory exam:  Monofilament sensation: abnormal - 6/10 via SW 5.07/10g monofilament to the plantar foot bilateral feet    Pulses: abnormal - 1/4 dorsalis pedis pulse and 1/4 Posterior tibial pulse,   Pinprick: Impaired  Proprioception: Impaired  Vibration (128 Hz): Impaired       DM with PVD       [x]Yes    []No      Assessment:  81 y.o. male with:   Diagnosis Orders   1. Type II diabetes mellitus with peripheral circulatory disorder (HCC)   DIABETES FOOT EXAM    05458 - [place] DEBRIDEMENT OF NAILS, 6 OR MORE   2. Dermatophytosis of nail   DIABETES FOOT EXAM    64321 - MI DEBRIDEMENT OF NAILS, 6 OR MORE   3. Peripheral vascular disorder (HCC)   DIABETES FOOT EXAM    41253 - MI DEBRIDEMENT OF NAILS, 6 OR MORE   4. Pain in both lower extremities   DIABETES FOOT EXAM    66440 - MI DEBRIDEMENT OF NAILS, 6 OR MORE   5. Xerosis cutis             Q7   []Yes    []No                Q8   [x]Yes    []No                     Q9   []Yes    []No    Plan:   Pt was evaluated and examined. Patient was given personalized discharge instructions. To address xerosis, patient to apply lachydrin cream to feet daily. Pt to monitor for fissures due to dryness. Advised pt to contact office is there are any open lesions. Nails 1-10 were debrided sharply in length and thickness with a nipper and , without incident. Pt will follow up in 9 weeks or sooner if any problems arise. Diagnosis was discussed with the pt and all of their questions were answered in detail. Proper foot hygiene and care was discussed with the pt. Informed patient on proper diabetic foot care and importance of tight glycemic control.   Patient to check feet daily and contact the office with any questions/problems/concerns. Other comorbidity noted and will be managed by PCP.   10/12/2020    Electronically signed by Dilia Garrison DPM on 10/12/2020 at 8:04 AM  10/12/2020

## 2020-11-17 ENCOUNTER — NURSE TRIAGE (OUTPATIENT)
Dept: OTHER | Facility: CLINIC | Age: 70
End: 2020-11-17

## 2020-11-17 NOTE — TELEPHONE ENCOUNTER
Reason for Disposition   Wound becomes more painful or swollen, 3 or more days after injury    Answer Assessment - Initial Assessment Questions  1. LOCATION: \"Where is the wound located? \"       Right elbow    2. WOUND APPEARANCE: \"What does the wound look like? \"   Red and hot to touch    3. SIZE: If redness is present, ask: \"What is the size of the red area? \" (Inches, centimeters, or compare to size of a coin)       Orange size    4. SPREAD: \"What's changed in the last day? \"  \"Do you see any red streaks coming from the wound? \"  Slightly better, no    5. ONSET: \"When did it start to look infected? \"    11/16/20    6. MECHANISM: \"How did the wound start, what was the cause? \"   red area, unsure    7. PAIN: \"Is there any pain? \" If so, ask: \"How bad is the pain? \"   (Scale 1-10; or mild, moderate, severe)  Mild    8. FEVER: \"Do you have a fever? \" If so, ask: \"What is your temperature, how was it measured, and when did it start? \"    No    9. OTHER SYMPTOMS: \"Do you have any other symptoms? \" (e.g., shaking chills, weakness, rash elsewhere on body)     No    Protocols used: WOUND INFECTION-ADULT-[place]    Patient called pre-service center [facility] with red flag complaint. Brief description of triage:cellulitis right elbow    Triage indicates for patient to be seen today or tomorrow. Care advice provided, patient verbalizes understanding; denies any other questions or concerns; instructed to call back for any new or worsening symptoms. Writer provided warm transfer to Martin at [facility] for appointment scheduling. Attention Provider: Thank you for allowing me to participate in the care of your patient. The patient was connected to triage in response to information provided to the [facility]. Please do not respond through this encounter as the response is not directed to a shared pool.

## 2020-11-18 ENCOUNTER — OFFICE VISIT (OUTPATIENT)
Dept: FAMILY MEDICINE CLINIC | Age: 70
End: 2020-11-18
Payer: MEDICARE

## 2020-11-18 ENCOUNTER — TELEPHONE (OUTPATIENT)
Dept: OTHER | Facility: CLINIC | Age: 70
End: 2020-11-18

## 2020-11-18 VITALS
WEIGHT: 257.6 LBS | BODY MASS INDEX: 36.88 KG/M2 | TEMPERATURE: 97.3 F | SYSTOLIC BLOOD PRESSURE: 130 MMHG | HEIGHT: 70 IN | DIASTOLIC BLOOD PRESSURE: 70 MMHG | HEART RATE: 60 BPM

## 2020-11-18 LAB — HBA1C MFR BLD: 6.6 %

## 2020-11-18 PROCEDURE — 99213 OFFICE O/P EST LOW 20 MIN: CPT | Performed by: STUDENT IN AN ORGANIZED HEALTH CARE EDUCATION/TRAINING PROGRAM

## 2020-11-18 PROCEDURE — 99211 OFF/OP EST MAY X REQ PHY/QHP: CPT | Performed by: STUDENT IN AN ORGANIZED HEALTH CARE EDUCATION/TRAINING PROGRAM

## 2020-11-18 RX ORDER — SULFAMETHOXAZOLE AND TRIMETHOPRIM 800; 160 MG/1; MG/1
1 TABLET ORAL 2 TIMES DAILY
Qty: 20 TABLET | Refills: 0 | Status: SHIPPED | OUTPATIENT
Start: 2020-11-18 | End: 2020-11-28

## 2020-11-18 NOTE — PROGRESS NOTES
Visit Information    Have you changed or started any medications since your last visit including any over-the-counter medicines, vitamins, or herbal medicines? no   Have you stopped taking any of your medications? Is so, why? -  no  Are you having any side effects from any of your medications? - no    Have you seen any other physician or provider since your last visit? yes - podiatry   Have you had any other diagnostic tests since your last visit?  no   Have you been seen in the emergency room and/or had an admission in a hospital since we last saw you?  no   Have you had your routine dental cleaning in the past 6 months?  no     Do you have an active MyChart account? If no, what is the barrier?   No: Inactive    Patient Care Team:  Shaila Samuels MD as PCP - General (Family Medicine)  Bia Mai MD as PCP - Cardiology (Internal Medicine)  Mary De La Cruz MD as PCP - [facility]  Di Raygoza MD as Referring Physician (Cardiology)  Brenda Wynne MD as Surgeon (Cardiothoracic Surgery)  Conchis Navarro DO (Podiatry)  Augustin Caal DPM as Physician (Podiatry)    Medical History Review  Past Medical, Family, and Social History reviewed and does not contribute to the patient presenting condition    Health Maintenance   Topic Date Due    Shingles Vaccine (1 of 2) 10/26/2002    Colon cancer screen colonoscopy  10/26/2002    Annual Wellness Visit (AWV)  06/19/2019    A1C test (Diabetic or Prediabetic)  11/05/2020    Diabetic retinal exam  01/06/2021    Diabetic microalbuminuria test  05/04/2021    Lipid screen  05/04/2021    Potassium monitoring  05/04/2021    Creatinine monitoring  05/04/2021    Diabetic foot exam  10/15/2021    DTaP/Tdap/Td vaccine (2 - Td) 12/09/2026    Flu vaccine  Completed    Pneumococcal 65+ years Vaccine  Completed    AAA screen  Completed    Hepatitis C screen  Completed    Hepatitis A vaccine  Aged Out    Hib vaccine  Aged Out    Meningococcal (ACWY) vaccine  Aged Out

## 2020-11-18 NOTE — PROGRESS NOTES
[facility] MEDICINE RESIDENCY PROGRAM    Subjective:    Megan Collins. is a 78 y.o. male with  has a past medical history of Acute coronary syndrome ([facility] [address] CAD (coronary artery disease), Chronic diastolic congestive heart failure ([address] Essential hypertension, Hyperlipidemia, Kidney stones, Metabolic syndrome, Obesity, and Umbilical hernia. No family history on file. Presented to the office today for:  Chief Complaint   Patient presents with    Elbow Pain     left elbow pain with redness, swelling. Warm to the touch - No injury reported - Lasting a couple days    Health Maintenance     Patient had colonoscopy 11/13/2012. Can't find report. HPI    Left elbow pain and swelling  Began 2 days ago  Swelling and redness, and warmth  Thinks it may be cellulitis. 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 Never requiring I&D. Reports pain is stable today but was getting worse    Has tried nothing yet  Hx: of T2DM not insulin, not on blood thins. NKDA  Denies fever, chills or recent trauma. Does not think he has had any bug bites. T2DM  Discussed diet, he does not adhere to low carb diet  Thinks its under control, only on metformin      Review of Systems   Constitutional: Negative for activity change, appetite change, chills, diaphoresis, fatigue, fever and unexpected weight change. HENT: Negative for congestion, rhinorrhea, sneezing, sore throat, trouble swallowing and voice change. Eyes: Negative for pain and visual disturbance. Respiratory: Negative for cough. Negative for shortness of breath, wheezing and stridor. Cardiovascular: Negative for chest pain, palpitations and leg swelling. Gastrointestinal: Negative for abdominal pain, constipation, diarrhea, nausea and vomiting. Genitourinary: Negative for difficulty urinating and dysuria.    Musculoskeletal: Negative for arthralgias, gait problem, neck pain and neck stiffness. Neurological: Negative for dizziness, seizures, syncope, weakness and headaches. Skin: left UE pain and swelling      Objective:    /70 (Site: Right Upper Arm, Position: Sitting, Cuff Size: Large Adult) Comment: machine  Pulse 60   Temp 97.3 °F (36.3 °C) (Temporal)   Ht 5' 10\" (1.778 m)   Wt 257 lb 9.6 oz (116.8 kg)   BMI 36.96 kg/m²    BP Readings from Last 3 Encounters:   11/18/20 130/70   10/06/20 132/73   09/28/20 127/76     Physical Exam  Constitutional:       General: He is not in acute distress. Appearance: Normal appearance. HENT:      Head: Normocephalic and atraumatic. Cardiovascular:      Rate and Rhythm: Normal rate and regular rhythm. No murmur   Pulmonary:      Effort: Pulmonary effort is normal. No respiratory distress. Breath sounds: Normal breath sounds. No wheezing. Abdominal:      General: Abdomen is flat. Bowel sounds are normal. There is no distension. Palpations: Abdomen is soft. Tenderness: There is no tenderness. Musculoskeletal: Normal range of motion. Right lower leg: No edema. Left lower leg: No edema. Skin:     General: Skin is warm and dry. L-UE: swelling at cubitus with redness and swelling extending ~10cm x 4 cm. No fluctuants or discharge. Skin intact. There is healing wound on right knuckle-4th digit. Pulses, sensation, strength and ROM intact and symmetrical with RUE. Neurological:      General: No focal deficit present. Mental Status: He is alert and oriented to person, place, and time.    Psychiatric:         Mood and Affect: Mood normal.        Lab Results   Component Value Date    WBC 8.2 06/18/2012    HGB 14.9 06/18/2012    HCT 43.9 06/18/2012     06/18/2012    CHOL 157 05/04/2020    TRIG 172 (H) 05/04/2020    HDL 39 (L) 05/04/2020    ALT 20 03/22/2018    AST 16 06/05/2017     05/04/2020    K 4.1 05/04/2020     05/04/2020    CREATININE 0.91 05/04/2020    BUN 14 05/04/2020

## 2020-11-20 NOTE — PROGRESS NOTES
Attending Physician Statement  I  have discussed the care of Shyam Alonso including pertinent history and exam findings with the resident. I agree with the assessment, plan and orders as documented by the resident. /70 (Site: Right Upper Arm, Position: Sitting, Cuff Size: Large Adult) Comment: machine  Pulse 60   Temp 97.3 °F (36.3 °C) (Temporal)   Ht 5' 10\" (1.778 m)   Wt 257 lb 9.6 oz (116.8 kg)   BMI 36.96 kg/m²    BP Readings from Last 3 Encounters:   11/18/20 130/70   10/06/20 132/73   09/28/20 127/76     Wt Readings from Last 3 Encounters:   11/18/20 257 lb 9.6 oz (116.8 kg)   10/12/20 258 lb (117 kg)   10/06/20 260 lb (117.9 kg)          Diagnosis Orders   1.  Cellulitis of left upper extremity  sulfamethoxazole-trimethoprim (BACTRIM DS;SEPTRA DS) 800-160 MG per tablet         Kemi Victor DO 11/20/2020 9:45 AM

## 2020-12-07 ENCOUNTER — OFFICE VISIT (OUTPATIENT)
Dept: FAMILY MEDICINE CLINIC | Age: 70
End: 2020-12-07
Payer: MEDICARE

## 2020-12-07 VITALS
WEIGHT: 259 LBS | TEMPERATURE: 96.9 F | DIASTOLIC BLOOD PRESSURE: 78 MMHG | BODY MASS INDEX: 37.08 KG/M2 | HEIGHT: 70 IN | HEART RATE: 57 BPM | SYSTOLIC BLOOD PRESSURE: 136 MMHG

## 2020-12-07 PROBLEM — L03.114 CELLULITIS OF LEFT UPPER EXTREMITY: Status: ACTIVE | Noted: 2020-12-07

## 2020-12-07 PROCEDURE — 99213 OFFICE O/P EST LOW 20 MIN: CPT | Performed by: STUDENT IN AN ORGANIZED HEALTH CARE EDUCATION/TRAINING PROGRAM

## 2020-12-07 ASSESSMENT — ENCOUNTER SYMPTOMS: SHORTNESS OF BREATH: 0

## 2020-12-07 NOTE — PROGRESS NOTES
Visit Information    Have you changed or started any medications since your last visit including any over-the-counter medicines, vitamins, or herbal medicines? no   Have you stopped taking any of your medications? Is so, why? -  no  Are you having any side effects from any of your medications? - no    Have you seen any other physician or provider since your last visit?  no   Have you had any other diagnostic tests since your last visit?  no   Have you been seen in the emergency room and/or had an admission in a hospital since we last saw you?  no   Have you had your routine dental cleaning in the past 6 months?  no     Do you have an active MyChart account? If no, what is the barrier?   Yes    Patient Care Team:  Tyrone Sullivan MD as PCP - General (Family Medicine)  Sergio Cantrell MD as PCP - Cardiology (Internal Medicine)  Sunitha Richmond MD as PCP - [facility]  Christine Olivas MD as Referring Physician (Cardiology)  Mumtaz Yanez MD as Surgeon (Cardiothoracic Surgery)  Reg Sandoval DO (Podiatry)  Williams Valentino DPM as Physician (Podiatry)    Medical History Review  Past Medical, Family, and Social History reviewed and does not contribute to the patient presenting condition    Health Maintenance   Topic Date Due    Shingles Vaccine (1 of 2) 10/26/2002    Colon cancer screen colonoscopy  10/26/2002    Annual Wellness Visit (AWV)  06/19/2019    Diabetic retinal exam  01/06/2021    Diabetic microalbuminuria test  05/04/2021    Lipid screen  05/04/2021    Potassium monitoring  05/04/2021    Creatinine monitoring  05/04/2021    A1C test (Diabetic or Prediabetic)  10/06/2021    Diabetic foot exam  10/15/2021    DTaP/Tdap/Td vaccine (2 - Td) 12/09/2026    Flu vaccine  Completed    Pneumococcal 65+ years Vaccine  Completed    AAA screen  Completed    Hepatitis C screen  Completed    Hepatitis A vaccine  Aged Out    Hib vaccine  Aged Out    Meningococcal (ACWY) vaccine  Aged Out

## 2020-12-07 NOTE — PROGRESS NOTES
Subjective:    Libra Perez is a 78 y.o. male with  has a past medical history of Acute coronary syndrome ([facility] [address] CAD (coronary artery disease), Chronic diastolic congestive heart failure ([address] Essential hypertension, Hyperlipidemia, Kidney stones, Metabolic syndrome, Obesity, and Umbilical hernia. History reviewed. No pertinent family history. Presented tothe office today for:  Chief Complaint   Patient presents with    Cellulitis     follow up    Nutrition Counseling     for DM       HPI  Patient is a 62-year-old male presenting for left elbow cellulitis. Has had cellulitis in the past; not requiring I&D  Was given Bactrim for 10 days and has symptom resolution  Denies any fever/chills, nausea/vomiting/diarrhea  No other complaints today    Review of Systems   Constitutional: Negative for chills and fever. Respiratory: Negative for shortness of breath. Cardiovascular: Negative for chest pain. Endocrine: Negative for polydipsia, polyphagia and polyuria. Neurological: Negative for dizziness, light-headedness and headaches. Objective:    /78 (Site: Left Upper Arm, Position: Sitting, Cuff Size: Large Adult)   Pulse 57   Temp 96.9 °F (36.1 °C) (Temporal)   Ht 5' 10\" (1.778 m)   Wt 259 lb (117.5 kg)   BMI 37.16 kg/m²    BP Readings from Last 3 Encounters:   12/07/20 136/78   11/18/20 130/70   10/06/20 132/73     Physical Exam  Constitutional:       Appearance: Normal appearance. HENT:      Head: Normocephalic and atraumatic. Eyes:      Extraocular Movements: Extraocular movements intact. Neck:      Musculoskeletal: Neck supple. Cardiovascular:      Rate and Rhythm: Regular rhythm. Heart sounds: Normal heart sounds. Pulmonary:      Effort: Pulmonary effort is normal. No respiratory distress. Breath sounds: Normal breath sounds. No wheezing. Neurological:      Mental Status: He is alert.            Lab Results   Component Value Date    WBC 8.2 06/18/2012 HGB 14.9 06/18/2012    HCT 43.9 06/18/2012     06/18/2012    CHOL 157 05/04/2020    TRIG 172 (H) 05/04/2020    HDL 39 (L) 05/04/2020    ALT 20 03/22/2018    AST 16 06/05/2017     05/04/2020    K 4.1 05/04/2020     05/04/2020    CREATININE 0.91 05/04/2020    BUN 14 05/04/2020    CO2 23 05/04/2020    PSA 0.35 09/09/2019    INR 1.0 02/24/2012    LABA1C 6.6 10/06/2020    LABMICR 9 05/04/2020     Lab Results   Component Value Date    CALCIUM 9.0 05/04/2020     Lab Results   Component Value Date    LDLCHOLESTEROL 84 05/04/2020       Assessment and Plan:    1. Cellulitis of left upper extremity  Follow-up as needed          Requested Prescriptions      No prescriptions requested or ordered in this encounter       There are no discontinued medications. Return in about 4 months (around 4/7/2021) for DM II. Isa received counseling on the following healthy behaviors: nutrition, exercise and medication adherence  Reviewed prior labs and health maintenance  Continue current medications, diet and exercise. Discussed use, benefit, and side effects of prescribed medications. Barriers to medication compliance addressed. Patient given educational materials - see patient instructions  Was a self-tracking handout given in paper form or via [employer]? No:    Requested Prescriptions      No prescriptions requested or ordered in this encounter       All patient questions answered. Patient voiced understanding. Quality Measures    Body mass index is 37.16 kg/m². Elevated. Weight control planned discussed Healthy diet and regular exercise. BP: 136/78 Blood pressure is normal. Treatment plan consists of No treatment change needed.     Lab Results   Component Value Date    LDLCHOLESTEROL 84 05/04/2020    (goal LDL reduction with dx if diabetes is 50% LDL reduction)      PHQ Scores 5/4/2020 6/10/2019 11/1/2018 9/19/2017 9/1/2015   PHQ2 Score 0 0 0 0 0   PHQ9 Score 0 0 0 0 0     Interpretation of Total Score Depression Severity: 1-4 = Minimal depression, 5-9 = Mild depression, 10-14 = Moderate depression, 15-19 = Moderately severe depression, 20-27 = Severe depression

## 2020-12-07 NOTE — PATIENT INSTRUCTIONS
Thank you for letting us take care of you today. We hope all your questions were addressed. If a question was overlooked or something else comes to mind after you return home, please contact a member of your Care Team listed below. Your Care Team at [facility] is Team #2  Brigida Wren DO (Faculty)  Candy Douglas (Faculty)  Hesham Kirkland MD (Resident)  Lily Alexandre MD (Resident)  Kapil Mckee MD (Resident)  Gretchen Stewart MD (Resident)  Tl Dai MD (Resident)  JESSI Kumar. ,LLUVIA VELEZ [facility] ([address] [facility] office)  Keiko Chowdhury, [address] (Clinical Practice Manager)  Doreene Phalen, [address] [facility] (Clinical Pharmacist)     Office phone number: [phone]    If you need to get in right away due to illness, please be advised we have \"Same Day\" appointments available Monday-Friday. Please call us at [phone] option #3 to schedule your \"Same Day\" appointment.

## 2020-12-08 NOTE — PROGRESS NOTES
Attending Physician Statement    Wt Readings from Last 3 Encounters:   12/07/20 259 lb (117.5 kg)   11/18/20 257 lb 9.6 oz (116.8 kg)   10/12/20 258 lb (117 kg)     Temp Readings from Last 3 Encounters:   12/07/20 96.9 °F (36.1 °C) (Temporal)   11/18/20 97.3 °F (36.3 °C) (Temporal)   10/12/20 97.3 °F (36.3 °C) (Temporal)     BP Readings from Last 3 Encounters:   12/07/20 136/78   11/18/20 130/70   10/06/20 132/73     Pulse Readings from Last 3 Encounters:   12/07/20 57   11/18/20 60   10/06/20 50         I have discussed the care of Chyna Vargas, including pertinent history and exam findings with the resident. I have reviewed the key elements of all parts of the encounter with the resident. I agree with the assessment, plan and orders as documented by the resident.   (GE Modifier)

## 2020-12-14 ENCOUNTER — OFFICE VISIT (OUTPATIENT)
Dept: PODIATRY | Age: 70
End: 2020-12-14
Payer: MEDICARE

## 2020-12-14 VITALS — WEIGHT: 259 LBS | BODY MASS INDEX: 37.08 KG/M2 | RESPIRATION RATE: 16 BRPM | HEIGHT: 70 IN | TEMPERATURE: 97.2 F

## 2020-12-14 PROCEDURE — 99213 OFFICE O/P EST LOW 20 MIN: CPT | Performed by: PODIATRIST

## 2020-12-14 PROCEDURE — 11721 DEBRIDE NAIL 6 OR MORE: CPT | Performed by: PODIATRIST

## 2020-12-14 NOTE — PROGRESS NOTES
[facility] PHYSICIANS  [facility]  [address] [place] [address]  Dept: [phone]  Dept Fax: [phone]    DIABETIC PROGRESS NOTE  Date of patient's visit: 12/14/2020  Patient's Name:  Nicole Serna. YOB: 1950            Patient Care Team:  Susie Cantor MD as PCP - General (Family Medicine)  Talya Haley MD as PCP - Cardiology (Internal Medicine)  Dinorah Ortiz MD [facility]  Lena Hardy MD as Referring Physician (Cardiology)  Zacarias Mendez MD as Surgeon (Cardiothoracic Surgery)  Sherrie Butt DO (Podiatry)  Raymondo Galeazzi, DPM as Physician (Podiatry)          Chief Complaint   Patient presents with    Diabetes    Foot Pain    Nail Problem       Subjective:   Nicole Serna. comes to clinic for Diabetes, Foot Pain, and Nail Problem    he is a diabetic and states that  He checks sugar regularly. Pt currently has complaint of thickened, elongated nails that they cannot manage by themselves. Pt's primary care physician is Susie Cantor MD last seen 12/7/20   Pt's last blood sugar was 123 . Pt has a new complaint of increased dry skin to americo feet. Lab Results   Component Value Date    LABA1C 6.6 10/06/2020      Complains of numbness in the feet bilat. Past Medical History:   Diagnosis Date    Acute coronary syndrome ([address]     CAD (coronary artery disease)     CABG X 2 2/24/2012    Chronic diastolic congestive heart failure ([address] 7/6/2016    Essential hypertension 12/9/2016    Hyperlipidemia 6/28/2012    Kidney stones     Metabolic syndrome     pre diabetes    Obesity 5/50/6799    Umbilical hernia        No Known Allergies  Current Outpatient Medications on File Prior to Visit   Medication Sig Dispense Refill    metFORMIN (GLUCOPHAGE) 1000 MG tablet TAKE ONE TABLET BY MOUTH TWICE A DAY WITH MEALS 180 tablet 0    ammonium lactate (LAC-HYDRIN) 12 % cream Apply topically as needed.  1 Bottle 4    albuterol deposits seen dorsal foot   Musculoskeletal:     1st MPJ ROM decreased, Bilateral.  Muscle strength 5/5, Bilateral.  Pain present upon palpation of toenails 1-5, Bilateral. decreased medial longitudinal arch, Bilateral.  Ankle ROM decreased,Bilateral.    Dorsally contracted digits present digits 2, Bilateral.     Vascular: DP pulses 1/4 bilateral.  PT pulses 0/4 bilateral.   CFT <5 seconds, Bilateral.  Hair growth absent to the level of the digits, Bilateral.  Edema present, Bilateral.  Varicosities absent, Bilateral. Erythema absent, Bilateral    Neurological: Sensation diminshed to light touch to level of digits, Bilateral.  Protective sensation intact 6/10 sites via 5.07/10g Afton-Juliette Monofilament, Bilateral.  negative Tinel's, Bilateral.  negative Valleix sign, Bilateral.      Integument: Warm, dry, supple, Bilateral.  Open lesion absent, Bilateral.  Interdigital maceration absent to web spaces 4, Bilateral.  Nails 1-5 left and 1-5 right thickened > 3.0 mm, dystrophic and crumbly, discolored with subungual debris. Fissures absent, Bilateral.   General: AAO x 3 in NAD.     Derm  Toenail Description  Sites of Onychomycosis Involvement (Check affected area)  [x] [x] [x] [x] [x] [x] [x] [x] [x] [x]  5 4 3 2 1 1 2 3 4 5                          Right                                        Left    Thickness  [x] [x] [x] [x] [x] [x] [x] [x] [x] [x]  5 4 3 2 1 1 2 3 4 5                         Right                                        Left    Dystrophic Changes   [x] [x] [x] [x] [x] [x] [x] [x] [x] [x]  5 4 3 2 1 1 2 3 4 5                         Right                                        Left    Color   [x] [x] [x] [x] [x] [x] [x] [x] [x] [x]  5 4 3 2 1 1 2 3 4 5                          Right                                        Left    Incurvation/Ingrowin   [] [] [] [] [] [] [] [] [] []  5 4 3 2 1 1 2 3 4 5                         Right                                        Left    Inflammation/Pain and contact the office with any questions/problems/concerns.    Other comorbidity noted and will be managed by PCP.  12/14/2020    Electronically signed by Shanda Villar DPM on 12/14/2020 at 8:01 AM  12/14/2020

## 2020-12-17 ENCOUNTER — OFFICE VISIT (OUTPATIENT)
Dept: FAMILY MEDICINE CLINIC | Age: 70
End: 2020-12-17
Payer: MEDICARE

## 2020-12-17 VITALS
HEART RATE: 56 BPM | BODY MASS INDEX: 37.25 KG/M2 | TEMPERATURE: 97.8 F | OXYGEN SATURATION: 97 % | SYSTOLIC BLOOD PRESSURE: 124 MMHG | WEIGHT: 260.2 LBS | DIASTOLIC BLOOD PRESSURE: 73 MMHG | HEIGHT: 70 IN | RESPIRATION RATE: 18 BRPM

## 2020-12-17 PROCEDURE — G0438 PPPS, INITIAL VISIT: HCPCS | Performed by: FAMILY MEDICINE

## 2020-12-17 ASSESSMENT — PATIENT HEALTH QUESTIONNAIRE - PHQ9
1. LITTLE INTEREST OR PLEASURE IN DOING THINGS: 0
2. FEELING DOWN, DEPRESSED OR HOPELESS: 0
SUM OF ALL RESPONSES TO PHQ QUESTIONS 1-9: 0
SUM OF ALL RESPONSES TO PHQ9 QUESTIONS 1 & 2: 0

## 2020-12-17 ASSESSMENT — LIFESTYLE VARIABLES: HOW OFTEN DO YOU HAVE A DRINK CONTAINING ALCOHOL: 0

## 2020-12-17 NOTE — PATIENT INSTRUCTIONS
Personalized Preventive Plan for Krysten Iniguez 12/17/2020  Medicare offers a range of preventive health benefits. Some of the tests and screenings are paid in full while other may be subject to a deductible, co-insurance, and/or copay. Some of these benefits include a comprehensive review of your medical history including lifestyle, illnesses that may run in your family, and various assessments and screenings as appropriate. After reviewing your medical record and screening and assessments performed today your provider may have ordered immunizations, labs, imaging, and/or referrals for you. A list of these orders (if applicable) as well as your Preventive Care list are included within your After Visit Summary for your review. Other Preventive Recommendations:    · A preventive eye exam performed by an eye specialist is recommended every 1-2 years to screen for glaucoma; cataracts, macular degeneration, and other eye disorders. · A preventive dental visit is recommended every 6 months. · Try to get at least 150 minutes of exercise per week or 10,000 steps per day on a pedometer . · Order or download the FREE \"Exercise & Physical Activity: Your Everyday Guide\" from [employer]. Call 8-932.575.1244 or search [employer] online. · You need 8597-4459 mg of calcium and 9593-6965 IU of vitamin D per day. It is possible to meet your calcium requirement with diet alone, but a vitamin D supplement is usually necessary to meet this goal.  · When exposed to the sun, use a sunscreen that protects against both UVA and UVB radiation with an SPF of 30 or greater. Reapply every 2 to 3 hours or after sweating, drying off with a towel, or swimming. · Always wear a seat belt when traveling in a car. Always wear a helmet when riding a bicycle or motorcycle.

## 2020-12-17 NOTE — PROGRESS NOTES
past 7 days, have you experienced any of the following?  New or Increased Pain, New or Increased Fatigue, Loneliness, Social Isolation, Stress or Anger?: None of These  Do you get the social and emotional support that you need?: Yes  Do you have a Living Will?: Yes  Advance Directives     Power of [facility] Will ACP-Advance Directive ACP-Power of     Not on File Not on File Not on File Not on File      General Health Risk Interventions:  · No Living Will: Patient declines ACP discussion/assistance    Health Habits/Nutrition:  Health Habits/Nutrition  Do you exercise for at least 20 minutes 2-3 times per week?: Yes  Have you lost any weight without trying in the past 3 months?: No  Do you eat fewer than 2 meals per day?: No  Have you seen a dentist within the past year?: (!) No(has no insurance for a dentist)  Body mass index: (!) 37.33  Health Habits/Nutrition Interventions:  · Dental exam overdue:  patient declines dental evaluation    Hearing/Vision:  No exam data present  Hearing/Vision  Do you or your family notice any trouble with your hearing?: (!) Yes(no insurance to be seen)  Do you have difficulty driving, watching TV, or doing any of your daily activities because of your eyesight?: No  Have you had an eye exam within the past year?: Yes(scheduled with [facility])  Hearing/Vision Interventions:  · Hearing concerns:  patient declines any further evaluation/treatment for hearing issues    Safety:  Safety  Do you have working smoke detectors?: Yes  Have all throw rugs been removed or fastened?: Yes  Do you have non-slip mats or surfaces in all bathtubs/showers?: Yes  Do all of your stairways have a railing or banister?: Yes  Are your doorways, halls and stairs free of clutter?: Yes  Do you always fasten your seatbelt when you are in a car?: (!) No(states they are uncomfortable and refused to wear then has gotten tickets for that in the past, spoke with patient and educated on the safety concerns and how much at risk he is patient promised he will start the first of the year)  Safety Interventions:  · Home safety tips provided     Personalized Preventive Plan   Current Health Maintenance Status  Immunization History   Administered Date(s) Administered    Influenza Virus Vaccine 10/20/2014, 10/09/2015    Influenza, Dede Finner, IM, (6 mo and older Fluzone, Flulaval, Fluarix and 3 yrs and older Afluria) 12/09/2016, 09/19/2017, 11/01/2018    Influenza, Quadv, IM, PF (6 mo and older Fluzone, Flulaval, Fluarix, and 3 yrs and older Afluria) 11/05/2019, 10/06/2020    Pneumococcal Conjugate 13-valent ([id number]) 03/22/2018    Pneumococcal Polysaccharide ([id number]) 06/07/2012, 11/05/2019    Tdap (Boostrix, Adacel) 12/09/2016        Health Maintenance   Topic Date Due    Shingles Vaccine (1 of 2) 10/26/2002    Colon cancer screen colonoscopy  10/26/2002    Annual Wellness Visit (AWV)  06/19/2019    Diabetic retinal exam  01/06/2021    Diabetic microalbuminuria test  05/04/2021    Lipid screen  05/04/2021    Potassium monitoring  05/04/2021    Creatinine monitoring  05/04/2021    A1C test (Diabetic or Prediabetic)  10/06/2021    Diabetic foot exam  10/15/2021    DTaP/Tdap/Td vaccine (2 - Td) 12/09/2026    Flu vaccine  Completed    Pneumococcal 65+ years Vaccine  Completed    AAA screen  Completed    Hepatitis C screen  Completed    Hepatitis A vaccine  Aged Out    Hib vaccine  Aged Out    Meningococcal (ACWY) vaccine  Aged Out     Recommendations for [place] Due: see orders and patient instructions/AVS.  . Recommended screening schedule for the next 5-10 years is provided to the patient in written form: see Patient Instructions/AVS.    IDerrell LPN, 05/19/0622, performed the documented evaluation under the direct supervision of the attending physician.

## 2021-02-24 ENCOUNTER — OFFICE VISIT (OUTPATIENT)
Dept: PODIATRY | Age: 71
End: 2021-02-24
Payer: MEDICARE

## 2021-02-24 VITALS — RESPIRATION RATE: 16 BRPM | BODY MASS INDEX: 36.94 KG/M2 | TEMPERATURE: 97 F | WEIGHT: 258 LBS | HEIGHT: 70 IN

## 2021-02-24 DIAGNOSIS — B35.1 DERMATOPHYTOSIS OF NAIL: ICD-10-CM

## 2021-02-24 DIAGNOSIS — I73.9 PERIPHERAL VASCULAR DISORDER (HCC): ICD-10-CM

## 2021-02-24 DIAGNOSIS — L85.3 XEROSIS CUTIS: ICD-10-CM

## 2021-02-24 DIAGNOSIS — E11.51 TYPE II DIABETES MELLITUS WITH PERIPHERAL CIRCULATORY DISORDER (HCC): Primary | ICD-10-CM

## 2021-02-24 DIAGNOSIS — M79.605 PAIN IN BOTH LOWER EXTREMITIES: ICD-10-CM

## 2021-02-24 DIAGNOSIS — M79.604 PAIN IN BOTH LOWER EXTREMITIES: ICD-10-CM

## 2021-02-24 PROCEDURE — 99213 OFFICE O/P EST LOW 20 MIN: CPT | Performed by: PODIATRIST

## 2021-02-24 PROCEDURE — 11721 DEBRIDE NAIL 6 OR MORE: CPT | Performed by: PODIATRIST

## 2021-02-24 RX ORDER — AMMONIUM LACTATE 12 G/100G
CREAM TOPICAL
Qty: 1 BOTTLE | Refills: 4 | Status: SHIPPED | OUTPATIENT
Start: 2021-02-24 | End: 2021-11-10 | Stop reason: ALTCHOICE

## 2021-02-24 NOTE — PROGRESS NOTES
needed. 1 Bottle 4    albuterol sulfate HFA (VENTOLIN HFA) 108 (90 Base) MCG/ACT inhaler Inhale 2 puffs into the lungs every 6 hours as needed for Wheezing 1 Inhaler 2    metoprolol tartrate (LOPRESSOR) 50 MG tablet Take 1 tablet by mouth 2 times daily 60 tablet 3    aspirin (ASPIRIN LOW DOSE) 81 MG chewable tablet CHEW ONE TABLET BY MOUTH DAILY 30 tablet 3    pravastatin (PRAVACHOL) 20 MG tablet Take 1 tablet by mouth daily 90 tablet 3    Lancets MISC 1 each by Does not apply route daily Test 2 times daily. Diagnosis  each 11    Glucose Blood (BLOOD GLUCOSE TEST STRIPS) STRP 1 each by In Vitro route 2 times daily Test 2 times daily. Diagnosis  strip 11    Blood Glucose Monitoring Suppl (BLOOD GLUCOSE METER) KIT Test 2 times daily Diagnosis DM 1 kit 0    Lancet Device MISC 1 Device by Does not apply route once for 1 dose. Test 2 times daily. Diagnosis DM 1 each 0    Alcohol Swabs (ALCOHOL PREP) 70 % PADS 1 each by Does not apply route 2 times daily. Test 2 times daily. Diagnosis  each 11     No current facility-administered medications on file prior to visit. Review of Systems    Review of Systems:   History obtained from chart review and the patient  General ROS: negative for - chills, fatigue, fever, night sweats or weight gain  Constitutional: Negative for chills, diaphoresis, fatigue, fever and unexpected weight change. Musculoskeletal: Positive for arthralgias, gait problem and joint swelling. Neurological ROS: negative for - behavioral changes, confusion, headaches or seizures. Negative for weakness and numbness. Dermatological ROS: negative for - mole changes, rash  Cardiovascular: Negative for leg swelling. Gastrointestinal: Negative for constipation, diarrhea, nausea and vomiting. Objective:  Dermatologic Exam:  Skin lesion/ulceration Absent . Skin No rashes or nodules noted. .   Skin is thin, with flaky sloughing skin as well as decreased hair growth to the lower leg  Small red hemosiderin deposits seen dorsal foot   Musculoskeletal:     1st MPJ ROM decreased, Bilateral.  Muscle strength 5/5, Bilateral.  Pain present upon palpation of toenails 1-5, Bilateral. decreased medial longitudinal arch, Bilateral.  Ankle ROM decreased,Bilateral.    Dorsally contracted digits present digits 2, Bilateral.     Vascular: DP pulses 1/4 bilateral.  PT pulses 0/4 bilateral.   CFT <5 seconds, Bilateral.  Hair growth absent to the level of the digits, Bilateral.  Edema present, Bilateral.  Varicosities absent, Bilateral. Erythema absent, Bilateral    Neurological: Sensation diminshed to light touch to level of digits, Bilateral.  Protective sensation intact 6/10 sites via 5.07/10g [place]iette Monofilament, Bilateral.  negative Tinel's, Bilateral.  negative Valleix sign, Bilateral.      Integument: Warm, dry, supple, Bilateral.  Open lesion absent, Bilateral.  Interdigital maceration absent to web spaces 4, Bilateral.  Nails 1-5 left and 1-5 right thickened > 3.0 mm, dystrophic and crumbly, discolored with subungual debris. Fissures absent, Bilateral.   General: AAO x 3 in NAD.     Derm  Toenail Description  Sites of Onychomycosis Involvement (Check affected area)  [x] [x] [x] [x] [x] [x] [x] [x] [x] [x]  5 4 3 2 1 1 2 3 4 5                          Right                                        Left    Thickness  [x] [x] [x] [x] [x] [x] [x] [x] [x] [x]  5 4 3 2 1 1 2 3 4 5                         Right                                        Left    Dystrophic Changes   [x] [x] [x] [x] [x] [x] [x] [x] [x] [x]  5 4 3 2 1 1 2 3 4 5                         Right                                        Left    Color   [x] [x] [x] [x] [x] [x] [x] [x] [x] [x]  5 4 3 2 1 1 2 3 4 5                          Right                                        Left    Incurvation/Ingrowin   [] [] [] [] [] [] [] [] [] []  5 4 3 2 1 1 2 3 4 5                         Right Left    Inflammation/Pain   [x] [x] [x] [x] [x] [x] [x] [x] [x] [x]  5 4 3 2 1 1 2 3 4 5                         Right                                        Left        Visual inspection:  Deformity: hammertoe deformity americo feet  amputation: absent  Skin lesions: absent  Edema: right- 2+ pitting edema, left- 2+ pitting edema    Sensory exam:  Monofilament sensation: abnormal - 6/10 via SW 5.07/10g monofilament to the plantar foot bilateral feet    Pulses: abnormal - 1/4 dorsalis pedis pulse and 1/4 Posterior tibial pulse,   Pinprick: Impaired  Proprioception: Impaired  Vibration (128 Hz): Impaired       DM with PVD       [x]Yes    []No      Assessment:  78 y.o. male with:   Diagnosis Orders   1. Type II diabetes mellitus with peripheral circulatory disorder (HCC)   DIABETES FOOT EXAM    18915 - HI DEBRIDEMENT OF NAILS, 6 OR MORE   2. Dermatophytosis of nail   DIABETES FOOT EXAM    36687 - HI DEBRIDEMENT OF NAILS, 6 OR MORE   3. Peripheral vascular disorder (HCC)   DIABETES FOOT EXAM    51188 - HI DEBRIDEMENT OF NAILS, 6 OR MORE   4. Pain in both lower extremities   DIABETES FOOT EXAM    64855 - HI DEBRIDEMENT OF NAILS, 6 OR MORE   5. Xerosis cutis           Q7   []Yes    []No                Q8   [x]Yes    []No                     Q9   []Yes    []No    Plan:   Pt was evaluated and examined. Patient was given personalized discharge instructions. To address xerosis, patient to apply lachydrin cream to feet daily. Pt to monitor for fissures due to dryness. Advised pt to contact office is there are any open lesions. Nails 1-10 were debrided sharply in length and thickness with a nipper and , without incident. Pt will follow up in 9 weeks or sooner if any problems arise. Diagnosis was discussed with the pt and all of their questions were answered in detail. Proper foot hygiene and care was discussed with the pt.  Informed patient on proper diabetic foot care and importance of tight glycemic control. Patient to check feet daily and contact the office with any questions/problems/concerns.    Other comorbidity noted and will be managed by PCP.  2/24/2021    Electronically signed by Rosanne Flores DPM on 2/24/2021 at 9:27 AM  2/24/2021

## 2021-04-28 ENCOUNTER — OFFICE VISIT (OUTPATIENT)
Dept: PODIATRY | Age: 71
End: 2021-04-28
Payer: MEDICARE

## 2021-04-28 VITALS — RESPIRATION RATE: 16 BRPM | WEIGHT: 255 LBS | HEIGHT: 70 IN | BODY MASS INDEX: 36.51 KG/M2

## 2021-04-28 DIAGNOSIS — M79.604 PAIN IN BOTH LOWER EXTREMITIES: ICD-10-CM

## 2021-04-28 DIAGNOSIS — E11.51 TYPE II DIABETES MELLITUS WITH PERIPHERAL CIRCULATORY DISORDER (HCC): Primary | ICD-10-CM

## 2021-04-28 DIAGNOSIS — M79.605 PAIN IN BOTH LOWER EXTREMITIES: ICD-10-CM

## 2021-04-28 DIAGNOSIS — I73.9 PERIPHERAL VASCULAR DISORDER (HCC): ICD-10-CM

## 2021-04-28 DIAGNOSIS — B35.1 DERMATOPHYTOSIS OF NAIL: ICD-10-CM

## 2021-04-28 PROCEDURE — 11721 DEBRIDE NAIL 6 OR MORE: CPT | Performed by: PODIATRIST

## 2021-04-28 NOTE — PROGRESS NOTES
[facility]  [facility]  [address] [place] [address]  Dept: [phone]  Dept Fax: [phone]    DIABETIC PROGRESS NOTE  Date of patient's visit: 4/28/2021  Patient's Name:  Charlie Santiago. YOB: 1950            Patient Care Team:  Desirae Ahuja MD as PCP - General (Family Medicine)  Jenita Gowers, MD as PCP - Cardiology (Internal Medicine)  Esperanza Hinton MD as PCP - [facility]  Chris Calle MD as Referring Physician (Cardiology)  Nuria Ma MD as Surgeon (Cardiothoracic Surgery)  Az Leonardo DO (Podiatry)  Selena Gunn DPM as Physician (Podiatry)          Chief Complaint   Patient presents with    Diabetes    Foot Pain    Nail Problem       Subjective:   Charlie Santiago. comes to clinic for Diabetes, Foot Pain, and Nail Problem    he is a diabetic and states that  He does not check sugar  Because he does not have meter. Pt currently has complaint of thickened, elongated nails that they cannot manage by themselves. Pt's primary care physician is Desirae Ahuja MD last seen 12/17/21   Pt's last blood sugar was  123. Lab Results   Component Value Date    LABA1C 6.6 10/06/2020      Complains of numbness in the feet bilat. Past Medical History:   Diagnosis Date    Acute coronary syndrome ([address]     CAD (coronary artery disease)     CABG X 2 2/24/2012    Chronic diastolic congestive heart failure ([address] 7/6/2016    Essential hypertension 12/9/2016    Hyperlipidemia 6/28/2012    Kidney stones     Metabolic syndrome     pre diabetes    Obesity 2/53/4844    Umbilical hernia        No Known Allergies  Current Outpatient Medications on File Prior to Visit   Medication Sig Dispense Refill    ammonium lactate (LAC-HYDRIN) 12 % cream Apply topically as needed.  1 Bottle 4    metFORMIN (GLUCOPHAGE) 1000 MG tablet TAKE ONE TABLET BY MOUTH TWICE A DAY WITH MEALS 180 tablet 0    albuterol sulfate HFA (VENTOLIN HFA) 108 (90 Base) MCG/ACT inhaler Inhale 2 puffs into the lungs every 6 hours as needed for Wheezing 1 Inhaler 2    metoprolol tartrate (LOPRESSOR) 50 MG tablet Take 1 tablet by mouth 2 times daily 60 tablet 3    aspirin (ASPIRIN LOW DOSE) 81 MG chewable tablet CHEW ONE TABLET BY MOUTH DAILY 30 tablet 3    pravastatin (PRAVACHOL) 20 MG tablet Take 1 tablet by mouth daily 90 tablet 3    Lancets MISC 1 each by Does not apply route daily Test 2 times daily. Diagnosis  each 11    Glucose Blood (BLOOD GLUCOSE TEST STRIPS) STRP 1 each by In Vitro route 2 times daily Test 2 times daily. Diagnosis  strip 11    Blood Glucose Monitoring Suppl (BLOOD GLUCOSE METER) KIT Test 2 times daily Diagnosis DM 1 kit 0    Alcohol Swabs (ALCOHOL PREP) 70 % PADS 1 each by Does not apply route 2 times daily. Test 2 times daily. Diagnosis  each 11    Lancet Device MISC 1 Device by Does not apply route once for 1 dose. Test 2 times daily. Diagnosis DM 1 each 0     No current facility-administered medications on file prior to visit. Review of Systems    Review of Systems:   History obtained from chart review and the patient  General ROS: negative for - chills, fatigue, fever, night sweats or weight gain  Constitutional: Negative for chills, diaphoresis, fatigue, fever and unexpected weight change. Musculoskeletal: Positive for arthralgias, gait problem and joint swelling. Neurological ROS: negative for - behavioral changes, confusion, headaches or seizures. Negative for weakness and numbness. Dermatological ROS: negative for - mole changes, rash  Cardiovascular: Negative for leg swelling. Gastrointestinal: Negative for constipation, diarrhea, nausea and vomiting. Objective:  Dermatologic Exam:  Skin lesion/ulceration Absent . Skin No rashes or nodules noted. .   Skin is thin, with flaky sloughing skin as well as decreased hair growth to the lower leg  Small red hemosiderin deposits seen dorsal foot Musculoskeletal:     1st MPJ ROM decreased, Bilateral.  Muscle strength 5/5, Bilateral.  Pain present upon palpation of toenails 1-5, Bilateral. decreased medial longitudinal arch, Bilateral.  Ankle ROM decreased,Bilateral.    Dorsally contracted digits present digits 2, Bilateral.     Vascular: DP pulses 1/4 bilateral.  PT pulses 0/4 bilateral.   CFT <5 seconds, Bilateral.  Hair growth absent to the level of the digits, Bilateral.  Edema present, Bilateral.  Varicosities absent, Bilateral. Erythema absent, Bilateral    Neurological: Sensation diminshed to light touch to level of digits, Bilateral.  Protective sensation intact 6/10 sites via 5.07/10g [place]-Juliette Monofilament, Bilateral.  negative Tinel's, Bilateral.  negative Valleix sign, Bilateral.      Integument: Warm, dry, supple, Bilateral.  Open lesion absent, Bilateral.  Interdigital maceration absent to web spaces 4, Bilateral.  Nails 1-5 left and 1-5 right thickened > 3.0 mm, dystrophic and crumbly, discolored with subungual debris. Fissures absent, Bilateral.   General: AAO x 3 in NAD.     Derm  Toenail Description  Sites of Onychomycosis Involvement (Check affected area)  [x] [x] [x] [x] [x] [x] [x] [x] [x] [x]  5 4 3 2 1 1 2 3 4 5                          Right                                        Left    Thickness  [x] [x] [x] [x] [x] [x] [x] [x] [x] [x]  5 4 3 2 1 1 2 3 4 5                         Right                                        Left    Dystrophic Changes   [x] [x] [x] [x] [x] [x] [x] [x] [x] [x]  5 4 3 2 1 1 2 3 4 5                         Right                                        Left    Color   [x] [x] [x] [x] [x] [x] [x] [x] [x] [x]  5 4 3 2 1 1 2 3 4 5                          Right                                        Left    Incurvation/Ingrowin   [] [] [] [] [] [] [] [] [] []  5 4 3 2 1 1 2 3 4 5                         Right                                        Left    Inflammation/Pain AM  4/28/2021

## 2021-06-21 ENCOUNTER — OFFICE VISIT (OUTPATIENT)
Dept: FAMILY MEDICINE CLINIC | Age: 71
End: 2021-06-21
Payer: MEDICARE

## 2021-06-21 VITALS
TEMPERATURE: 97.8 F | HEART RATE: 55 BPM | BODY MASS INDEX: 37.02 KG/M2 | SYSTOLIC BLOOD PRESSURE: 119 MMHG | WEIGHT: 258 LBS | DIASTOLIC BLOOD PRESSURE: 70 MMHG

## 2021-06-21 DIAGNOSIS — I50.32 CHRONIC DIASTOLIC CONGESTIVE HEART FAILURE (HCC): ICD-10-CM

## 2021-06-21 DIAGNOSIS — E11.9 TYPE 2 DIABETES MELLITUS WITHOUT COMPLICATION, WITHOUT LONG-TERM CURRENT USE OF INSULIN (HCC): Primary | ICD-10-CM

## 2021-06-21 DIAGNOSIS — E66.01 SEVERE OBESITY (BMI 35.0-39.9) WITH COMORBIDITY (HCC): ICD-10-CM

## 2021-06-21 DIAGNOSIS — Z13.220 SCREENING FOR HYPERLIPIDEMIA: ICD-10-CM

## 2021-06-21 LAB — HBA1C MFR BLD: 7.7 %

## 2021-06-21 PROCEDURE — 99211 OFF/OP EST MAY X REQ PHY/QHP: CPT | Performed by: STUDENT IN AN ORGANIZED HEALTH CARE EDUCATION/TRAINING PROGRAM

## 2021-06-21 PROCEDURE — 83036 HEMOGLOBIN GLYCOSYLATED A1C: CPT | Performed by: STUDENT IN AN ORGANIZED HEALTH CARE EDUCATION/TRAINING PROGRAM

## 2021-06-21 PROCEDURE — 3051F HG A1C>EQUAL 7.0%<8.0%: CPT | Performed by: STUDENT IN AN ORGANIZED HEALTH CARE EDUCATION/TRAINING PROGRAM

## 2021-06-21 PROCEDURE — 99213 OFFICE O/P EST LOW 20 MIN: CPT | Performed by: STUDENT IN AN ORGANIZED HEALTH CARE EDUCATION/TRAINING PROGRAM

## 2021-06-21 RX ORDER — GLUCOSAMINE HCL/CHONDROITIN SU 500-400 MG
CAPSULE ORAL
Qty: 100 STRIP | Refills: 1 | Status: SHIPPED | OUTPATIENT
Start: 2021-06-21 | End: 2021-11-10 | Stop reason: ALTCHOICE

## 2021-06-21 RX ORDER — BLOOD-GLUCOSE METER
1 KIT MISCELLANEOUS DAILY
Qty: 1 KIT | Refills: 0 | Status: SHIPPED | OUTPATIENT
Start: 2021-06-21 | End: 2021-11-10 | Stop reason: ALTCHOICE

## 2021-06-21 RX ORDER — LANCETS 30 GAUGE
1 EACH MISCELLANEOUS DAILY
Qty: 100 EACH | Refills: 11 | Status: SHIPPED | OUTPATIENT
Start: 2021-06-21 | End: 2021-06-21

## 2021-06-21 RX ORDER — LANCETS 30 GAUGE
1 EACH MISCELLANEOUS 4 TIMES DAILY
Qty: 600 EACH | Refills: 1 | Status: SHIPPED | OUTPATIENT
Start: 2021-06-21 | End: 2021-11-10 | Stop reason: ALTCHOICE

## 2021-06-21 ASSESSMENT — PATIENT HEALTH QUESTIONNAIRE - PHQ9
1. LITTLE INTEREST OR PLEASURE IN DOING THINGS: 0
2. FEELING DOWN, DEPRESSED OR HOPELESS: 0
SUM OF ALL RESPONSES TO PHQ9 QUESTIONS 1 & 2: 0
SUM OF ALL RESPONSES TO PHQ QUESTIONS 1-9: 0

## 2021-06-21 ASSESSMENT — ENCOUNTER SYMPTOMS
ABDOMINAL PAIN: 0
SHORTNESS OF BREATH: 0
CHEST TIGHTNESS: 0

## 2021-06-21 NOTE — PROGRESS NOTES
Subjective:    Sangita Hayes is a 78 y.o. male with  has a past medical history of Acute coronary syndrome ([address] CAD (coronary artery disease), Chronic diastolic congestive heart failure ([address] Essential hypertension, Hyperlipidemia, Kidney stones, Metabolic syndrome, Obesity, and Umbilical hernia. No family history on file. Presented tothe office today for:  Chief Complaint   Patient presents with    Check-Up     patient states he doing well       HPI  62-year-old male presenting for diabetes follow-up  HbA1c today 7.7; up from 6.6 in October of last year  Currently on Metformin 1000 mg twice daily  Denies any fatigue, nocturia  Admits to noncompliance with diabetic diet    History of diastolic CHF  Denies any chest pain, shortness of breath, orthopnea  On metoprolol 50 mg twice daily    Review of Systems   Constitutional: Negative for chills, fatigue and fever. Eyes: Negative for visual disturbance. Respiratory: Negative for chest tightness and shortness of breath. Cardiovascular: Negative for chest pain and leg swelling. Gastrointestinal: Negative for abdominal pain. Endocrine: Negative for polydipsia, polyphagia and polyuria. Neurological: Negative for dizziness, light-headedness and headaches. Objective:    /70 (Site: Left Upper Arm, Position: Sitting, Cuff Size: Large Adult)   Pulse 55   Temp 97.8 °F (36.6 °C) (Temporal)   Wt 258 lb (117 kg)   BMI 37.02 kg/m²    BP Readings from Last 3 Encounters:   06/21/21 119/70   12/17/20 124/73   12/07/20 136/78     Physical Exam  Vitals and nursing note reviewed. Constitutional:       Appearance: Normal appearance. He is well-developed. He is obese. HENT:      Head: Normocephalic and atraumatic. Cardiovascular:      Rate and Rhythm: Normal rate and regular rhythm. Heart sounds: Normal heart sounds. Pulmonary:      Effort: Pulmonary effort is normal. No respiratory distress. Breath sounds: Normal breath sounds. No wheezing. Skin:     General: Skin is warm and dry. Neurological:      Mental Status: He is alert. Lab Results   Component Value Date    WBC 8.2 06/18/2012    HGB 14.9 06/18/2012    HCT 43.9 06/18/2012     06/18/2012    CHOL 157 05/04/2020    TRIG 172 (H) 05/04/2020    HDL 39 (L) 05/04/2020    ALT 20 03/22/2018    AST 16 06/05/2017     05/04/2020    K 4.1 05/04/2020     05/04/2020    CREATININE 0.91 05/04/2020    BUN 14 05/04/2020    CO2 23 05/04/2020    PSA 0.35 09/09/2019    INR 1.0 02/24/2012    LABA1C 7.7 06/21/2021    LABMICR 9 05/04/2020     Lab Results   Component Value Date    CALCIUM 9.0 05/04/2020     Lab Results   Component Value Date    LDLCHOLESTEROL 84 05/04/2020       Assessment and Plan:    1. Type 2 diabetes mellitus without complication, without long-term current use of insulin ([facility])  Discussed increasing hemoglobin  Discussed different pharmacologic agents with patient  Patient would like to try being more compliant with diabetic diet and weight loss before any additional oral or injectable agents are started  - Microalbumin, Ur; Future  - POCT glycosylated hemoglobin (Hb A1C)  - metFORMIN (GLUCOPHAGE) 1000 MG tablet; TAKE ONE TABLET BY MOUTH TWICE A DAY WITH MEALS  Dispense: 60 tablet; Refill: 0  - Basic Metabolic Panel; Future  - [facility] - Goldie Alcaraz MD, Ophthalmology, [place]  - glucose monitoring (FREESTYLE FREEDOM) kit; 1 kit by Does not apply route daily  Dispense: 1 kit; Refill: 0  - Lancets MISC; 1 each by Does not apply route 4 times daily  Dispense: 600 each; Refill: 1  - Alcohol prep pad  - blood glucose monitor strips; Test 3 times a day. Dispense sufficient amount for indicated testing frequency plus additional to accommodate PRN testing needs. Dispense: 100 strip; Refill: 1    2. Chronic diastolic congestive heart failure (HCC)  Stable    3. Severe obesity (BMI 35.0-39. 9) with comorbidity ([address]  Discussed weight loss    4.  Screening for hyperlipidemia  - Lipid Panel; Future          Requested Prescriptions     Signed Prescriptions Disp Refills    metFORMIN (GLUCOPHAGE) 1000 MG tablet 60 tablet 0     Sig: TAKE ONE TABLET BY MOUTH TWICE A DAY WITH MEALS    glucose monitoring (FREESTYLE FREEDOM) kit 1 kit 0     Si kit by Does not apply route daily    Lancets MISC 600 each 1     Si each by Does not apply route 4 times daily    blood glucose monitor strips 100 strip 1     Sig: Test 3 times a day. Dispense sufficient amount for indicated testing frequency plus additional to accommodate PRN testing needs.        Medications Discontinued During This Encounter   Medication Reason    Lancets MISC REORDER    metFORMIN (GLUCOPHAGE) 1000 MG tablet REORDER    Lancets MISC LIST CLEANUP    Glucose Blood (BLOOD GLUCOSE TEST STRIPS) STRP LIST CLEANUP       Return in about 3 months (around 2021) for DM II.          \

## 2021-06-21 NOTE — PATIENT INSTRUCTIONS
Thank you for letting us take care of you today. We hope all your questions were addressed. If a question was overlooked or something else comes to mind after you return home, please contact a member of your Care Team listed below. Your Care Team at [place] [address] is Team #2  Clarissa Waite DO (Faculty)  Jayden Redd (Faculty)  Flora Esparza MD (Resident)  Rod Whitaker MD (Resident)  Coreen Alcantar MD (Resident)  Chun Stephenson MD (Resident)  Jose Francisco Templeton MD (Resident)  JESSI Sesay. ,Yessica Menon ([address] [facility] office)  Kiran Edwards, [address] (Clinical Practice Manager)  Carri Sevilla, [address] (Clinical Pharmacist)     Office phone number: [phone]    If you need to get in right away due to illness, please be advised we have \"Same Day\" appointments available Monday-Friday. Please call us at [phone] option #3 to schedule your \"Same Day\" appointment. Patient Education        Learning About Carbohydrate (Carb) Counting and Eating Out When You Have Diabetes  Why plan your meals? Meal planning can be a key part of managing diabetes. Planning meals and snacks with the right balance of carbohydrate, protein, and fat can help you keep your blood sugar at the target level you set with your doctor. You don't have to eat special foods. You can eat what your family eats, including sweets once in a while. But you do have to pay attention to how often you eat and how much you eat of certain foods. You may want to work with a dietitian or a certified diabetes educator. He or she can give you tips and meal ideas and can answer your questions about meal planning. This health professional can also help you reach a healthy weight if that is one of your goals. What should you know about eating carbs? Managing the amount of carbohydrate (carbs) you eat is an important part of healthy meals when you have diabetes. Carbohydrate is found in many foods. · Learn which foods have carbs. And learn the amounts of carbs in different foods. ? Bread, cereal, pasta, and rice have about 15 grams of carbs in a serving. A serving is 1 slice of bread (1 ounce), ½ cup of cooked cereal, or 1/3 cup of cooked pasta or rice. ? Fruits have 15 grams of carbs in a serving. A serving is 1 small fresh fruit, such as an apple or orange; ½ of a banana; ½ cup of cooked or canned fruit; ½ cup of fruit juice; 1 cup of melon or raspberries; or 2 tablespoons of dried fruit. ? Milk and no-sugar-added yogurt have 15 grams of carbs in a serving. A serving is 1 cup of milk or 2/3 cup of no-sugar-added yogurt. ? Starchy vegetables have 15 grams of carbs in a serving. A serving is ½ cup of mashed potatoes or sweet potato; 1 cup winter squash; ½ of a small baked potato; ½ cup of cooked beans; or ½ cup cooked corn or green peas. · Learn how much carbs to eat each day and at each meal. A dietitian or CDE can teach you how to keep track of the amount of carbs you eat. This is called carbohydrate counting. · If you are not sure how to count carbohydrate grams, use the Plate Method to plan meals. It is a good, quick way to make sure that you have a balanced meal. It also helps you spread carbs throughout the day. ? Divide your plate by types of foods. Put non-starchy vegetables on half the plate, meat or other protein food on one-quarter of the plate, and a grain or starchy vegetable in the final quarter of the plate. To this you can add a small piece of fruit and 1 cup of milk or yogurt, depending on how many carbs you are supposed to eat at a meal.  · Try to eat about the same amount of carbs at each meal. Do not \"save up\" your daily allowance of carbs to eat at one meal.  · Proteins have very little or no carbs per serving. Examples of proteins are beef, chicken, turkey, fish, eggs, tofu, cheese, cottage cheese, and peanut butter. A serving size of meat is 3 ounces, which is about the size of a deck of cards.  Examples of meat substitute serving sizes (equal to 1 ounce of meat) are 1/4 cup of cottage cheese, 1 egg, 1 tablespoon of peanut butter, and ½ cup of tofu. How can you eat out and still eat healthy? · Learn to estimate the serving sizes of foods that have carbohydrate. If you measure food at home, it will be easier to estimate the amount in a serving of restaurant food. · If the meal you order has too much carbohydrate (such as potatoes, corn, or baked beans), ask to have a low-carbohydrate food instead. Ask for a salad or green vegetables. · If you use insulin, check your blood sugar before and after eating out to help you plan how much to eat in the future. · If you eat more carbohydrate at a meal than you had planned, take a walk or do other exercise. This will help lower your blood sugar. What are some tips for eating healthy? · Limit saturated fat, such as the fat from meat and dairy products. This is a healthy choice because people who have diabetes are at higher risk of heart disease. So choose lean cuts of meat and nonfat or low-fat dairy products. Use olive or canola oil instead of butter or shortening when cooking. · Don't skip meals. Your blood sugar may drop too low if you skip meals and take insulin or certain medicines for diabetes. · Check with your doctor before you drink alcohol. Alcohol can cause your blood sugar to drop too low. Alcohol can also cause a bad reaction if you take certain diabetes medicines. Follow-up care is a key part of your treatment and safety. Be sure to make and go to all appointments, and call your doctor if you are having problems. It's also a good idea to know your test results and keep a list of the medicines you take. Where can you learn more? Go to https://ulises.PubNative. org and sign in to your [employer] account. Enter E109 in the [facility] box to learn more about \"Learning About Carbohydrate (Carb) Counting and Eating Out When You Have Diabetes. \" blood sugar as much as carbs do. If you eat a lot of these nutrients in a meal, your blood sugar will rise more slowly than it would otherwise. Eat from all food groups  · Eat at least three meals a day. · Plan meals to include food from all the food groups. The food groups include grains, fruits, dairy, proteins, and vegetables. · Talk to your dietitian or diabetes educator about ways to add limited amounts of sweets into your meal plan. · If you drink alcohol, talk to your doctor. It may not be recommended when you are taking certain diabetes medicines. Where can you learn more? Go to https://"[employer]pepiceweb.[employer] [place]. org and sign in to your StatusPage account. Enter K633 in the [employer] box to learn more about \"Counting Carbohydrates for Diabetes: Care Instructions. \"     If you do not have an account, please click on the \"Sign Up Now\" link. Current as of: August 31, 2020               Content Version: 12.9  © 2006-2021 Healthwise, Incorporated. Care instructions adapted under license by [facility] ([facility]). If you have questions about a medical condition or this instruction, always ask your healthcare professional. [address] any warranty or liability for your use of this information.

## 2021-06-21 NOTE — PROGRESS NOTES
Attending Physician Statement  I have discussed the care of Valentino Birch 78 y.o. male, including pertinent history and exam findings, with the resident Dr. Bishop Okeefe MD.    History and Exam:   Chief Complaint   Patient presents with    Check-Up     patient states he doing well     Past Medical History:   Diagnosis Date    Acute coronary syndrome ([facility]     CAD (coronary artery disease)     CABG X 2 2/24/2012    Chronic diastolic congestive heart failure ([facility] 7/6/2016    Essential hypertension 12/9/2016    Hyperlipidemia 6/28/2012    Kidney stones     Metabolic syndrome     pre diabetes    Obesity 9/96/2288    Umbilical hernia      No Known Allergies   I have seen and examined the patient and the key elements of the encounter have been performed by me. BP Readings from Last 3 Encounters:   06/21/21 119/70   12/17/20 124/73   12/07/20 136/78     /70 (Site: Left Upper Arm, Position: Sitting, Cuff Size: Large Adult)   Pulse 55   Temp 97.8 °F (36.6 °C) (Temporal)   Wt 258 lb (117 kg)   BMI 37.02 kg/m²   Lab Results   Component Value Date    WBC 8.2 06/18/2012    HGB 14.9 06/18/2012    HCT 43.9 06/18/2012     06/18/2012    CHOL 157 05/04/2020    TRIG 172 (H) 05/04/2020    HDL 39 (L) 05/04/2020    ALT 20 03/22/2018    AST 16 06/05/2017     05/04/2020    K 4.1 05/04/2020     05/04/2020    CREATININE 0.91 05/04/2020    BUN 14 05/04/2020    CO2 23 05/04/2020    PSA 0.35 09/09/2019    INR 1.0 02/24/2012    LABA1C 7.7 06/21/2021    LABMICR 9 05/04/2020     Lab Results   Component Value Date    LABPROT 6.8 12/03/2012    LABALBU 4.4 09/01/2015     No results found for: IRON, TIBC, FERRITIN  Lab Results   Component Value Date    LDLCHOLESTEROL 84 05/04/2020     I agree with the assessment, plan and the diagnosis of    Diagnosis Orders   1.  Type 2 diabetes mellitus without complication, without long-term current use of insulin (HCC)  Microalbumin, Ur    POCT glycosylated hemoglobin (Hb

## 2021-06-30 ENCOUNTER — OFFICE VISIT (OUTPATIENT)
Dept: PODIATRY | Age: 71
End: 2021-06-30
Payer: MEDICARE

## 2021-06-30 VITALS — WEIGHT: 258 LBS | BODY MASS INDEX: 36.94 KG/M2 | RESPIRATION RATE: 16 BRPM | HEIGHT: 70 IN

## 2021-06-30 DIAGNOSIS — I73.9 PERIPHERAL VASCULAR DISORDER (HCC): ICD-10-CM

## 2021-06-30 DIAGNOSIS — M79.604 PAIN IN BOTH LOWER EXTREMITIES: ICD-10-CM

## 2021-06-30 DIAGNOSIS — L85.3 XEROSIS CUTIS: ICD-10-CM

## 2021-06-30 DIAGNOSIS — E11.51 TYPE II DIABETES MELLITUS WITH PERIPHERAL CIRCULATORY DISORDER (HCC): Primary | ICD-10-CM

## 2021-06-30 DIAGNOSIS — M79.605 PAIN IN BOTH LOWER EXTREMITIES: ICD-10-CM

## 2021-06-30 DIAGNOSIS — B35.1 DERMATOPHYTOSIS OF NAIL: ICD-10-CM

## 2021-06-30 PROCEDURE — 3051F HG A1C>EQUAL 7.0%<8.0%: CPT | Performed by: PODIATRIST

## 2021-06-30 PROCEDURE — 11721 DEBRIDE NAIL 6 OR MORE: CPT | Performed by: PODIATRIST

## 2021-06-30 PROCEDURE — 99213 OFFICE O/P EST LOW 20 MIN: CPT | Performed by: PODIATRIST

## 2021-06-30 RX ORDER — LISINOPRIL 5 MG/1
TABLET ORAL
COMMUNITY
End: 2021-09-21 | Stop reason: SDUPTHER

## 2021-06-30 NOTE — PROGRESS NOTES
MOUTH TWICE A DAY WITH MEALS 60 tablet 0    glucose monitoring (FREESTYLE FREEDOM) kit 1 kit by Does not apply route daily 1 kit 0    Lancets MISC 1 each by Does not apply route 4 times daily 600 each 1    blood glucose monitor strips Test 3 times a day. Dispense sufficient amount for indicated testing frequency plus additional to accommodate PRN testing needs. 100 strip 1    ammonium lactate (LAC-HYDRIN) 12 % cream Apply topically as needed. 1 Bottle 4    albuterol sulfate HFA (VENTOLIN HFA) 108 (90 Base) MCG/ACT inhaler Inhale 2 puffs into the lungs every 6 hours as needed for Wheezing 1 Inhaler 2    metoprolol tartrate (LOPRESSOR) 50 MG tablet Take 1 tablet by mouth 2 times daily 60 tablet 3    aspirin (ASPIRIN LOW DOSE) 81 MG chewable tablet CHEW ONE TABLET BY MOUTH DAILY 30 tablet 3    pravastatin (PRAVACHOL) 20 MG tablet Take 1 tablet by mouth daily 90 tablet 3    Blood Glucose Monitoring Suppl (BLOOD GLUCOSE METER) KIT Test 2 times daily Diagnosis DM 1 kit 0    Alcohol Swabs (ALCOHOL PREP) 70 % PADS 1 each by Does not apply route 2 times daily. Test 2 times daily. Diagnosis  each 11    Lancet Device MISC 1 Device by Does not apply route once for 1 dose. Test 2 times daily. Diagnosis DM 1 each 0     No current facility-administered medications on file prior to visit. Review of Systems    Review of Systems:   History obtained from chart review and the patient  General ROS: negative for - chills, fatigue, fever, night sweats or weight gain  Constitutional: Negative for chills, diaphoresis, fatigue, fever and unexpected weight change. Musculoskeletal: Positive for arthralgias, gait problem and joint swelling. Neurological ROS: negative for - behavioral changes, confusion, headaches or seizures. Negative for weakness and numbness. Dermatological ROS: negative for - mole changes, rash  Cardiovascular: Negative for leg swelling.    Gastrointestinal: Negative for constipation, diarrhea, Right                                        Left    Incurvation/Ingrowin   [] [] [] [] [] [] [] [] [] []  5 4 3 2 1 1 2 3 4 5                         Right                                        Left    Inflammation/Pain   [x] [x] [x] [x] [x] [x] [x] [x] [x] [x]  5 4 3 2 1 1 2 3 4 5                         Right                                        Left        Visual inspection:  Deformity: hammertoe deformity amercio feet  amputation: absent  Skin lesions: absent  Edema: right- 2+ pitting edema, left- 2+ pitting edema    Sensory exam:  Monofilament sensation: abnormal - 6/10 via SW 5.07/10g monofilament to the plantar foot bilateral feet    Pulses: abnormal - 1/4 dorsalis pedis pulse and 1/4 Posterior tibial pulse,   Pinprick: Impaired  Proprioception: Impaired  Vibration (128 Hz): Impaired       DM with PVD       [x]Yes    []No      Assessment:  78 y.o. male with:   Diagnosis Orders   1. Type II diabetes mellitus with peripheral circulatory disorder (HCC)  HM DIABETES FOOT EXAM    22332 - [place] DEBRIDEMENT OF NAILS, 6 OR MORE   2. Dermatophytosis of nail   DIABETES FOOT EXAM    30870 - [place] DEBRIDEMENT OF NAILS, 6 OR MORE   3. Peripheral vascular disorder (HCC)  HM DIABETES FOOT EXAM    28069 - [place] DEBRIDEMENT OF NAILS, 6 OR MORE   4. Pain in both lower extremities  HM DIABETES FOOT EXAM    19145 - [place] DEBRIDEMENT OF NAILS, 6 OR MORE   5. Xerosis cutis   DIABETES FOOT EXAM         Q7   []Yes    []No                Q8   [x]Yes    []No                     Q9   []Yes    []No    Plan:   Pt was evaluated and examined. Patient was given personalized discharge instructions. To address xerosis, patient to apply lachydrin cream to feet daily. Pt to monitor for fissures due to dryness. Advised pt to contact office is there are any open lesions. Nails 1-10 were debrided sharply in length and thickness with a nipper and , without incident. Pt will follow up in 9 weeks or sooner if any problems arise. Diagnosis was discussed with the pt and all of their questions were answered in detail. Proper foot hygiene and care was discussed with the pt. Informed patient on proper diabetic foot care and importance of tight glycemic control. Patient to check feet daily and contact the office with any questions/problems/concerns.    Other comorbidity noted and will be managed by PCP.  6/30/2021    Electronically signed by Basil Fischer DPM on 6/30/2021 at 7:51 AM  6/30/2021

## 2021-06-30 NOTE — PATIENT INSTRUCTIONS
Schedule a Vaccine  When you qualify to receive the vaccine, call the [facility]) COVID-19 Vaccination Hotline to schedule your appointment or to get additional information about the [facility]) locations which are offering the COVID-19 vaccine. To be 94% effective, it's important that you receive two doses of one of the COVID-19 vaccines. -If you are receiving the Escoto Peter vaccine, your second shot will be scheduled as close to 21 days after the first shot as possible. -If you are receiving the Moderna vaccine, your second shot will be scheduled as close to 28 days after the first shot as possible. [facility]) COVID-19 Vaccination Hotline: [phone]    Links to [facility]) website and [facility] website:    Wily[employer]"/[facility]-monitoring-[email or web address]-[email or web address]-19-[email or web address]    [email or web address]

## 2021-09-01 ENCOUNTER — OFFICE VISIT (OUTPATIENT)
Dept: PODIATRY | Age: 71
End: 2021-09-01
Payer: MEDICARE

## 2021-09-01 VITALS — BODY MASS INDEX: 36.94 KG/M2 | WEIGHT: 258 LBS | HEIGHT: 70 IN

## 2021-09-01 DIAGNOSIS — E11.51 TYPE II DIABETES MELLITUS WITH PERIPHERAL CIRCULATORY DISORDER (HCC): Primary | ICD-10-CM

## 2021-09-01 DIAGNOSIS — M79.605 PAIN IN BOTH LOWER EXTREMITIES: ICD-10-CM

## 2021-09-01 DIAGNOSIS — I73.9 PERIPHERAL VASCULAR DISORDER (HCC): ICD-10-CM

## 2021-09-01 DIAGNOSIS — B35.1 DERMATOPHYTOSIS OF NAIL: ICD-10-CM

## 2021-09-01 DIAGNOSIS — M79.604 PAIN IN BOTH LOWER EXTREMITIES: ICD-10-CM

## 2021-09-01 DIAGNOSIS — L85.3 XEROSIS CUTIS: ICD-10-CM

## 2021-09-01 PROCEDURE — 99213 OFFICE O/P EST LOW 20 MIN: CPT | Performed by: PODIATRIST

## 2021-09-01 PROCEDURE — 11721 DEBRIDE NAIL 6 OR MORE: CPT | Performed by: PODIATRIST

## 2021-09-01 PROCEDURE — 3051F HG A1C>EQUAL 7.0%<8.0%: CPT | Performed by: PODIATRIST

## 2021-09-01 NOTE — PROGRESS NOTES
[facility] PHYSICIANS  [facility]  [address] [place] [address]  Dept: [phone]  Dept Fax: [phone]    DIABETIC PROGRESS NOTE  Date of patient's visit: 9/1/2021  Patient's Name:  Nadine Galan. YOB: 1950            Patient Care Team:  Leigh Whiting MD as PCP - General (Family Medicine)  Giuliano Oquendo MD as PCP - Cardiology (Internal Medicine)  Kiana Rodriguez MD as PCP - [address] [place] [address]  [facility] Provider  Bree Alfredo MD as Referring Physician (Cardiology)  Jorge Gillespie MD as Surgeon (Cardiothoracic Surgery)  Isidro Dye DO (Podiatry)  Tanner Prater DPM as Physician (Podiatry)          Chief Complaint   [facility]   [facility]     [facility] [place]     Peripheral Neuropathy       Subjective:   Nadine Galan. comes to clinic for Diabetes [facility] [place] ) and Peripheral Neuropathy    he is a diabetic and states that needs toenails trimmed. Pt currently has complaint of thickened, elongated nails that they cannot manage by themselves. Pt's primary care physician is Leigh Whiting MD last seen 6/21/21   Pt's last blood sugar was patient states did not check today. Pt has a new complaint of increased dry skin to americo feet. Lab Results   Component Value Date    LABA1C 7.7 06/21/2021      Complains of numbness in the feet bilat.   Past Medical History:   Diagnosis Date    Acute coronary syndrome ([address]     CAD (coronary artery disease)     CABG X 2 2/24/2012    Chronic diastolic congestive heart failure ([address] 7/6/2016    Essential hypertension 12/9/2016    Hyperlipidemia 6/28/2012    Kidney stones     Metabolic syndrome     pre diabetes    Obesity 3/66/2229    Umbilical hernia        No Known Allergies  Current Outpatient Medications on File Prior to Visit   Medication Sig Dispense Refill    metFORMIN (GLUCOPHAGE) 1000 MG tablet TAKE ONE TABLET BY MOUTH TWICE A DAY WITH MEALS 60 tablet 0    lisinopril (PRINIVIL;ZESTRIL) 5 MG tablet lisinopril 5 mg tablet   Take 1 tablet every day by oral route.  glucose monitoring (FREESTYLE FREEDOM) kit 1 kit by Does not apply route daily 1 kit 0    Lancets MISC 1 each by Does not apply route 4 times daily 600 each 1    blood glucose monitor strips Test 3 times a day. Dispense sufficient amount for indicated testing frequency plus additional to accommodate PRN testing needs. 100 strip 1    ammonium lactate (LAC-HYDRIN) 12 % cream Apply topically as needed. 1 Bottle 4    albuterol sulfate HFA (VENTOLIN HFA) 108 (90 Base) MCG/ACT inhaler Inhale 2 puffs into the lungs every 6 hours as needed for Wheezing 1 Inhaler 2    metoprolol tartrate (LOPRESSOR) 50 MG tablet Take 1 tablet by mouth 2 times daily 60 tablet 3    aspirin (ASPIRIN LOW DOSE) 81 MG chewable tablet CHEW ONE TABLET BY MOUTH DAILY 30 tablet 3    pravastatin (PRAVACHOL) 20 MG tablet Take 1 tablet by mouth daily 90 tablet 3    Blood Glucose Monitoring Suppl (BLOOD GLUCOSE METER) KIT Test 2 times daily Diagnosis DM 1 kit 0    Alcohol Swabs (ALCOHOL PREP) 70 % PADS 1 each by Does not apply route 2 times daily. Test 2 times daily. Diagnosis  each 11    Lancet Device MISC 1 Device by Does not apply route once for 1 dose. Test 2 times daily. Diagnosis DM 1 each 0     No current facility-administered medications on file prior to visit. Review of Systems    Review of Systems:   History obtained from chart review and the patient  General ROS: negative for - chills, fatigue, fever, night sweats or weight gain  Constitutional: Negative for chills, diaphoresis, fatigue, fever and unexpected weight change. Musculoskeletal: Positive for arthralgias, gait problem and joint swelling. Neurological ROS: negative for - behavioral changes, confusion, headaches or seizures. Negative for weakness and numbness. Dermatological ROS: negative for - mole changes, rash  Cardiovascular: Negative for leg swelling.    Gastrointestinal: Negative for constipation, diarrhea, nausea and vomiting. Objective:  Dermatologic Exam:  Skin lesion/ulceration Absent . Skin No rashes or nodules noted. .   Skin is thin, with flaky sloughing skin as well as decreased hair growth to the lower leg  Small red hemosiderin deposits seen dorsal foot   Musculoskeletal:     1st MPJ ROM decreased, Bilateral.  Muscle strength 5/5, Bilateral.  Pain present upon palpation of toenails 1-5, Bilateral. decreased medial longitudinal arch, Bilateral.  Ankle ROM decreased,Bilateral.    Dorsally contracted digits present digits 2, Bilateral.     Vascular: DP pulses 1/4 bilateral.  PT pulses 0/4 bilateral.   CFT <5 seconds, Bilateral.  Hair growth absent to the level of the digits, Bilateral.  Edema present, Bilateral.  Varicosities absent, Bilateral. Erythema absent, Bilateral    Neurological: Sensation diminshed to light touch to level of digits, Bilateral.  Protective sensation intact 6/10 sites via 5.07/10g Daphne-Juliette Monofilament, Bilateral.  negative Tinel's, Bilateral.  negative Valleix sign, Bilateral.      Integument: Warm, dry, supple, Bilateral.  Open lesion absent, Bilateral.  Interdigital maceration absent to web spaces 4, Bilateral.  Nails 1-5 left and 1-5 right thickened > 3.0 mm, dystrophic and crumbly, discolored with subungual debris. Fissures absent, Bilateral.   General: AAO x 3 in NAD.     Derm  Toenail Description  Sites of Onychomycosis Involvement (Check affected area)  [x] [x] [x] [x] [x] [x] [x] [x] [x] [x]  5 4 3 2 1 1 2 3 4 5                          Right                                        Left    Thickness  [x] [x] [x] [x] [x] [x] [x] [x] [x] [x]  5 4 3 2 1 1 2 3 4 5                         Right                                        Left    Dystrophic Changes   [x] [x] [x] [x] [x] [x] [x] [x] [x] [x]  5 4 3 2 1 1 2 3 4 5                         Right                                        Left    Color [x] [x] [x] [x] [x] [x] [x] [x] [x] [x]  5 4 3 2 1 1 2 3 4 5                          Right                                        Left    Incurvation/Ingrowin   [] [] [] [] [] [] [] [] [] []  5 4 3 2 1 1 2 3 4 5                         Right                                        Left    Inflammation/Pain   [x] [x] [x] [x] [x] [x] [x] [x] [x] [x]  5 4 3 2 1 1 2 3 4 5                         Right                                        Left        Visual inspection:  Deformity: hammertoe deformity americo feet  amputation: absent  Skin lesions: absent  Edema: right- 2+ pitting edema, left- 2+ pitting edema    Sensory exam:  Monofilament sensation: abnormal - 6/10 via SW 5.07/10g monofilament to the plantar foot bilateral feet    Pulses: abnormal - 1/4 dorsalis pedis pulse and 1/4 Posterior tibial pulse,   Pinprick: Impaired  Proprioception: Impaired  Vibration (128 Hz): Impaired       DM with PVD       [x]Yes    []No      Assessment:  78 y.o. male with:   Diagnosis Orders   1. Type II diabetes mellitus with peripheral circulatory disorder (HCC)  08116 - [place] DEBRIDEMENT OF NAILS, 6 OR MORE    HM DIABETES FOOT EXAM   2. Dermatophytosis of nail  12546 - [place] DEBRIDEMENT OF NAILS, 6 OR MORE    HM DIABETES FOOT EXAM   3. Peripheral vascular disorder (HCC)  63098 - [place] DEBRIDEMENT OF NAILS, 6 OR MORE    HM DIABETES FOOT EXAM   4. Pain in both lower extremities  00360 - [place] DEBRIDEMENT OF NAILS, 6 OR MORE    HM DIABETES FOOT EXAM   5. Xerosis cutis  88946 - [place] DEBRIDEMENT OF NAILS, 6 OR MORE    HM DIABETES FOOT EXAM           Q7   []Yes    []No                Q8   [x]Yes    []No                     Q9   []Yes    []No    Plan:   Pt was evaluated and examined. Patient was given personalized discharge instructions. To address xerosis, patient to apply lachydrin cream to feet daily. Pt to monitor for fissures due to dryness. Advised pt to contact office is there are any open lesions.       Nails 1-10 were debrided sharply in length and thickness with a nipper and , without incident. Pt will follow up in 8 weeks or sooner if any problems arise. Diagnosis was discussed with the pt and all of their questions were answered in detail. Proper foot hygiene and care was discussed with the pt. Informed patient on proper diabetic foot care and importance of tight glycemic control. Patient to check feet daily and contact the office with any questions/problems/concerns.    Other comorbidity noted and will be managed by PCP.  9/1/2021    Electronically signed by Joshua Samuels DPM on 9/1/2021 at 8:07 AM  9/1/2021

## 2021-09-01 NOTE — PATIENT INSTRUCTIONS
Schedule a Vaccine  When you qualify to receive the vaccine, call the [facility]) COVID-19 Vaccination Hotline to schedule your appointment or to get additional information about the [facility]) locations which are offering the COVID-19 vaccine. To be 94% effective, it's important that you receive two doses of one of the COVID-19 vaccines. -If you are receiving the Escoto Peter vaccine, your second shot will be scheduled as close to 21 days after the first shot as possible. -If you are receiving the Moderna vaccine, your second shot will be scheduled as close to 28 days after the first shot as possible. [facility]) COVID-19 Vaccination Hotline: [phone]    Links to [facility]) website and [facility] website:    [place]-monitoring-coronavirus-covid-19/covid-19-vaccine/[place]-vaccine    [email or web address]

## 2021-09-07 ENCOUNTER — HOSPITAL ENCOUNTER (OUTPATIENT)
Age: 71
Setting detail: SPECIMEN
Discharge: HOME | End: 2021-09-07
Payer: MEDICARE

## 2021-09-07 DIAGNOSIS — E11.9 TYPE 2 DIABETES MELLITUS WITHOUT COMPLICATION, WITHOUT LONG-TERM CURRENT USE OF INSULIN (HCC): ICD-10-CM

## 2021-09-07 DIAGNOSIS — Z13.220 SCREENING FOR HYPERLIPIDEMIA: ICD-10-CM

## 2021-09-07 LAB
ANION GAP SERPL CALCULATED.3IONS-SCNC: 17 MMOL/L (ref 9–17)
BUN BLDV-MCNC: 14 MG/DL (ref 8–23)
BUN/CREAT BLD: ABNORMAL (ref 9–20)
CALCIUM SERPL-MCNC: 9.2 MG/DL (ref 8.6–10.4)
CHLORIDE BLD-SCNC: 101 MMOL/L (ref 98–107)
CHOLESTEROL/HDL RATIO: 3.2
CHOLESTEROL: 126 MG/DL
CO2: 21 MMOL/L (ref 20–31)
CREAT SERPL-MCNC: 0.77 MG/DL (ref 0.7–1.2)
CREATININE URINE: 187.9 MG/DL (ref 39–259)
GFR AFRICAN AMERICAN: >60 ML/MIN
GFR NON-AFRICAN AMERICAN: >60 ML/MIN
GFR SERPL CREATININE-BSD FRML MDRD: ABNORMAL ML/MIN/{1.73_M2}
GFR SERPL CREATININE-BSD FRML MDRD: ABNORMAL ML/MIN/{1.73_M2}
GLUCOSE BLD-MCNC: 143 MG/DL (ref 70–99)
HDLC SERPL-MCNC: 39 MG/DL
LDL CHOLESTEROL: 64 MG/DL (ref 0–130)
MICROALBUMIN/CREAT 24H UR: 12 MG/L
MICROALBUMIN/CREAT UR-RTO: 6 MCG/MG CREAT
POTASSIUM SERPL-SCNC: 4.4 MMOL/L (ref 3.7–5.3)
SODIUM BLD-SCNC: 139 MMOL/L (ref 135–144)
TRIGL SERPL-MCNC: 116 MG/DL
VLDLC SERPL CALC-MCNC: ABNORMAL MG/DL (ref 1–30)

## 2021-09-19 DIAGNOSIS — E11.9 TYPE 2 DIABETES MELLITUS WITHOUT COMPLICATION, WITHOUT LONG-TERM CURRENT USE OF INSULIN (HCC): ICD-10-CM

## 2021-09-20 NOTE — TELEPHONE ENCOUNTER
Last visit: 06/21/21  Last Med refill: 07/19/21  Does patient have enough medication for 72 hours: Yes    Next Visit Date: 09/21/21  Future Appointments   Date Time Provider Sera Yadav   9/21/2021  8:30 AM MD [place]na [place] [facility]   11/3/2021  8:30 AM SHAYE Hernandez [facility] [address] Maintenance   Topic Date Due    Colon cancer screen colonoscopy  Never done    Shingles Vaccine (1 of 2) Never done    Diabetic retinal exam  01/06/2021    Flu vaccine (1) 09/01/2021    Annual Wellness Visit (AWV)  12/18/2021    A1C test (Diabetic or Prediabetic)  06/21/2022    Diabetic microalbuminuria test  09/07/2022    Lipid screen  09/07/2022    Potassium monitoring  09/07/2022    Creatinine monitoring  09/07/2022    Diabetic foot exam  09/13/2022    DTaP/Tdap/Td vaccine (2 - Td or Tdap) 12/09/2026    Pneumococcal 65+ years Vaccine  Completed    COVID-19 Vaccine  Completed    AAA screen  Completed    Hepatitis C screen  Completed    Hepatitis A vaccine  Aged Out    Hib vaccine  Aged Out    Meningococcal (ACWY) vaccine  Aged Out       Hemoglobin A1C (%)   Date Value   06/21/2021 7.7   10/06/2020 6.6   11/05/2019 6.7             ( goal A1C is < 7)   Microalb/Crt.  Ratio (mcg/mg creat)   Date Value   09/07/2021 6     LDL Cholesterol (mg/dL)   Date Value   09/07/2021 64   05/04/2020 84       (goal LDL is <100)   AST (U/L)   Date Value   06/05/2017 16     ALT (U/L)   Date Value   03/22/2018 20     BUN (mg/dL)   Date Value   09/07/2021 14     BP Readings from Last 3 Encounters:   06/21/21 119/70   12/17/20 124/73   12/07/20 136/78          (goal 120/80)    All Future Testing planned in [facility]  Lab Frequency Next Occurrence   XR Abdomen Kub 1 VW Once 09/28/2021   PSA, Diagnostic Once 09/28/2021               Patient Active Problem List:     CAD (coronary artery disease)     Bypass graft stenosis (HCC)     Need for pneumococcal vaccine     Metabolic syndrome     Prediabetes Dyslipidemia     Metabolic disorder     Obesity     Chronic diastolic congestive heart failure (HCC)     Pre-diabetes     Essential hypertension     Diabetes mellitus , without long-term current use of insulin (HCC)     Anemia     Chronic coronary artery disease     Acute on chronic diastolic congestive heart failure ([facility]     Other hyperlipidemia     Hypocalcemia     Severe obesity (BMI 35.0-39. 9) with comorbidity ([facility]     Hypertrophy of prostate with urinary obstruction     History of kidney stones     Type 2 diabetes mellitus without complication, without long-term current use of insulin (HCC)     Cellulitis of left upper extremity

## 2021-09-21 ENCOUNTER — OFFICE VISIT (OUTPATIENT)
Dept: FAMILY MEDICINE CLINIC | Age: 71
End: 2021-09-21
Payer: MEDICARE

## 2021-09-21 VITALS
DIASTOLIC BLOOD PRESSURE: 70 MMHG | HEIGHT: 70 IN | BODY MASS INDEX: 36.54 KG/M2 | WEIGHT: 255.2 LBS | HEART RATE: 52 BPM | SYSTOLIC BLOOD PRESSURE: 122 MMHG

## 2021-09-21 DIAGNOSIS — I10 ESSENTIAL HYPERTENSION: ICD-10-CM

## 2021-09-21 DIAGNOSIS — I25.83 CORONARY ARTERY DISEASE DUE TO LIPID RICH PLAQUE: ICD-10-CM

## 2021-09-21 DIAGNOSIS — I25.10 CORONARY ARTERY DISEASE DUE TO LIPID RICH PLAQUE: ICD-10-CM

## 2021-09-21 DIAGNOSIS — Z23 NEED FOR VACCINATION: ICD-10-CM

## 2021-09-21 DIAGNOSIS — E11.9 TYPE 2 DIABETES MELLITUS WITHOUT COMPLICATION, WITHOUT LONG-TERM CURRENT USE OF INSULIN (HCC): Primary | ICD-10-CM

## 2021-09-21 LAB — HBA1C MFR BLD: 7.7 %

## 2021-09-21 PROCEDURE — 90686 IIV4 VACC NO PRSV 0.5 ML IM: CPT | Performed by: STUDENT IN AN ORGANIZED HEALTH CARE EDUCATION/TRAINING PROGRAM

## 2021-09-21 PROCEDURE — 99213 OFFICE O/P EST LOW 20 MIN: CPT | Performed by: STUDENT IN AN ORGANIZED HEALTH CARE EDUCATION/TRAINING PROGRAM

## 2021-09-21 PROCEDURE — 3051F HG A1C>EQUAL 7.0%<8.0%: CPT | Performed by: STUDENT IN AN ORGANIZED HEALTH CARE EDUCATION/TRAINING PROGRAM

## 2021-09-21 PROCEDURE — 83036 HEMOGLOBIN GLYCOSYLATED A1C: CPT | Performed by: STUDENT IN AN ORGANIZED HEALTH CARE EDUCATION/TRAINING PROGRAM

## 2021-09-21 RX ORDER — LISINOPRIL 5 MG/1
TABLET ORAL
Qty: 30 TABLET | Refills: 5 | Status: SHIPPED | OUTPATIENT
Start: 2021-09-21

## 2021-09-21 RX ORDER — METOPROLOL TARTRATE 50 MG/1
50 TABLET, FILM COATED ORAL 2 TIMES DAILY
Qty: 60 TABLET | Refills: 3 | Status: SHIPPED | OUTPATIENT
Start: 2021-09-21

## 2021-09-21 RX ORDER — PRAVASTATIN SODIUM 20 MG
20 TABLET ORAL DAILY
Qty: 90 TABLET | Refills: 3 | Status: SHIPPED | OUTPATIENT
Start: 2021-09-21 | End: 2021-11-10

## 2021-09-21 RX ORDER — ASPIRIN 81 MG/1
TABLET, CHEWABLE ORAL
Qty: 30 TABLET | Refills: 3 | Status: SHIPPED | OUTPATIENT
Start: 2021-09-21

## 2021-09-21 RX ORDER — ALBUTEROL SULFATE 90 UG/1
2 AEROSOL, METERED RESPIRATORY (INHALATION) EVERY 6 HOURS PRN
Qty: 1 EACH | Refills: 5 | Status: SHIPPED | OUTPATIENT
Start: 2021-09-21 | End: 2021-11-10 | Stop reason: ALTCHOICE

## 2021-09-21 ASSESSMENT — ENCOUNTER SYMPTOMS
ABDOMINAL DISTENTION: 0
ABDOMINAL PAIN: 0
COLOR CHANGE: 0
SINUS PAIN: 0
COUGH: 0
SHORTNESS OF BREATH: 0
WHEEZING: 0
VOMITING: 0
SINUS PRESSURE: 0
VOICE CHANGE: 0
NAUSEA: 0

## 2021-09-21 NOTE — PROGRESS NOTES
Diabetic visit information    BP Readings from Last 3 Encounters:   06/21/21 119/70   12/17/20 124/73   12/07/20 136/78       Hemoglobin A1C (%)   Date Value   06/21/2021 7.7   10/06/2020 6.6   11/05/2019 6.7     Microalb/Crt. Ratio (mcg/mg creat)   Date Value   09/07/2021 6     LDL Cholesterol (mg/dL)   Date Value   09/07/2021 64               Have you changed or started any medications since your last visit including any over-the-counter medicines, vitamins, or herbal medicines? no   Have you stopped taking any of your medications? Is so, why? -  no  Are you having any side effects from any of your medications? - no    Have you seen any other physician or provider since your last visit?  no   Have you had any other diagnostic tests since your last visit?  no   Have you been seen in the emergency room and/or had an admission in a hospital since we last saw you?  no     Have you had your annual diabetic retinal (eye) exam? No   (ensure copy of exam is in the chart)    Have you had your routine dental cleaning in the past 6 months? no    Do you have an active MyChart account? If not, what are your barriers? No: declined    Patient Care Team:  Rigoberto Gaston MD as PCP - General (Family Medicine)  Francheska Mishra MD as PCP - Cardiology (Internal Medicine)  Luis M Christie MD as PCP - [facility] Provider  Homa De León MD as Referring Physician (Cardiology)  Harjit Ny MD as Surgeon (Cardiothoracic Surgery)  Brooks Saeed DO (Podiatry)  Mago Espinoza DPM as Physician (Podiatry)    Medical history Review  Past Medical, Family, and Social History reviewed and does not contribute to the patient presenting condition.     Health Maintenance   Topic Date Due    Colon cancer screen colonoscopy  Never done    Shingles Vaccine (1 of 2) Never done    Diabetic retinal exam  01/06/2021    Flu vaccine (1) 09/01/2021    Annual Wellness Visit (AWV)  12/18/2021    A1C test (Diabetic or Prediabetic)  06/21/2022    Diabetic microalbuminuria test  09/07/2022    Lipid screen  09/07/2022    Potassium monitoring  09/07/2022    Creatinine monitoring  09/07/2022    Diabetic foot exam  09/13/2022    DTaP/Tdap/Td vaccine (2 - Td or Tdap) 12/09/2026    Pneumococcal 65+ years Vaccine  Completed    COVID-19 Vaccine  Completed    AAA screen  Completed    Hepatitis C screen  Completed    Hepatitis A vaccine  Aged Out    Hib vaccine  Aged Out    Meningococcal (ACWY) vaccine  Aged Out

## 2021-09-21 NOTE — PROGRESS NOTES
Subjective:    Alma Delia Guillermo. is a 78 y.o. male with  has a past medical history of Acute coronary syndrome ([facility] [address] CAD (coronary artery disease), Chronic diastolic congestive heart failure ([address] Essential hypertension, Hyperlipidemia, Kidney stones, Metabolic syndrome, Obesity, and Umbilical hernia. Presented to the office today for:  Chief Complaint   Patient presents with    Diabetes     follow up     Results     urine test        HPI    T2DM  Currently taking metformin 1000mg BID  Last A1c in June was 7.7  Patient reports consuming lots of cookies and diet rich in carbs. He denies any exercise as part of daily routine. HTN  Controlled   currently on Lisinopril 5mg daily, lopressor 50mg BID    CAD  Denies any chest pains or SOB today. Compliant with medications. Dr. Ivette Nugent at [facility]. Follows up every year. Review of Systems   Constitutional: Negative for fatigue, fever and unexpected weight change. HENT: Negative for sinus pressure, sinus pain and voice change. Eyes: Negative for visual disturbance. Respiratory: Negative for cough, shortness of breath and wheezing. Cardiovascular: Negative for chest pain, palpitations and leg swelling. Gastrointestinal: Negative for abdominal distention, abdominal pain, nausea and vomiting. Endocrine: Negative for polydipsia, polyphagia and polyuria. Genitourinary: Negative for difficulty urinating, flank pain and frequency. Skin: Negative for color change, rash and wound. Neurological: Negative for dizziness, light-headedness, numbness and headaches. Psychiatric/Behavioral: Negative for confusion and decreased concentration. The patient is not nervous/anxious. The patient has a History reviewed. No pertinent family history.     Objective:    /70 (Site: Right Upper Arm, Position: Sitting, Cuff Size: Medium Adult)   Pulse 52   Ht 5' 10\" (1.778 m)   Wt 255 lb 3.2 oz (115.8 kg)   BMI 36.62 kg/m²    BP Readings from Last 3 Encounters:   09/21/21 122/70   06/21/21 119/70   12/17/20 124/73       Physical Exam  Constitutional:       Appearance: He is well-developed. HENT:      Head: Normocephalic and atraumatic. Eyes:      Pupils: Pupils are equal, round, and reactive to light. Cardiovascular:      Rate and Rhythm: Normal rate and regular rhythm. Heart sounds: Normal heart sounds. No murmur heard. No friction rub. No gallop. Pulmonary:      Effort: Pulmonary effort is normal. No respiratory distress. Breath sounds: Normal breath sounds. No wheezing or rales. Chest:      Chest wall: No tenderness. Abdominal:      General: Bowel sounds are normal. There is no distension. Palpations: Abdomen is soft. Tenderness: There is no abdominal tenderness. Skin:     General: Skin is warm. Findings: No erythema or rash. Neurological:      Mental Status: He is alert and oriented to person, place, and time. Lab Results   Component Value Date    WBC 8.2 06/18/2012    HGB 14.9 06/18/2012    HCT 43.9 06/18/2012     06/18/2012    CHOL 126 09/07/2021    TRIG 116 09/07/2021    HDL 39 (L) 09/07/2021    ALT 20 03/22/2018    AST 16 06/05/2017     09/07/2021    K 4.4 09/07/2021     09/07/2021    CREATININE 0.77 09/07/2021    BUN 14 09/07/2021    CO2 21 09/07/2021    PSA 0.35 09/09/2019    INR 1.0 02/24/2012    LABA1C 7.7 06/21/2021    LABMICR 6 09/07/2021     Lab Results   Component Value Date    CALCIUM 9.2 09/07/2021     Lab Results   Component Value Date    LDLCHOLESTEROL 64 09/07/2021       Assessment and Plan:    1. Type 2 diabetes mellitus without complication, without long-term current use of insulin ([facility])  - POCT glycosylated hemoglobin (Hb A1C) - 7.7  -Hemoglobin A1c unchanged from June  -Patient currently on Metformin 1000 mg twice daily  -Counseled extensively on dietary modifications and physical activity on a daily basis. -Follow-up in 3 months    2.  Essential hypertension  -Controlled, currently on lisinopril and metoprolol 50 mg twice daily    3. Coronary artery disease due to atherosclerosis  -[facility]  - metoprolol tartrate (LOPRESSOR) 50 MG tablet; Take 1 tablet by mouth 2 times daily  Dispense: 60 tablet; Refill: 3  - pravastatin (PRAVACHOL) 20 MG tablet; Take 1 tablet by mouth daily  Dispense: 90 tablet; Refill: 3    4. Need for vaccination  - INFLUENZA, QUADV, 3 YRS AND OLDER, IM PF, PREFILL SYR OR SDV, 0.5ML (AFLURIA QUADV, PF)          Requested Prescriptions     Signed Prescriptions Disp Refills    lisinopril (PRINIVIL;ZESTRIL) 5 MG tablet 30 tablet 5     Sig: lisinopril 5 mg tablet   Take 1 tablet every day by oral route.  albuterol sulfate HFA (VENTOLIN HFA) 108 (90 Base) MCG/ACT inhaler 1 each 5     Sig: Inhale 2 puffs into the lungs every 6 hours as needed for Wheezing    metoprolol tartrate (LOPRESSOR) 50 MG tablet 60 tablet 3     Sig: Take 1 tablet by mouth 2 times daily    aspirin (ASPIRIN LOW DOSE) 81 MG chewable tablet 30 tablet 3     Sig: CHEW ONE TABLET BY MOUTH DAILY    pravastatin (PRAVACHOL) 20 MG tablet 90 tablet 3     Sig: Take 1 tablet by mouth daily       Medications Discontinued During This Encounter   Medication Reason    aspirin (ASPIRIN LOW DOSE) 81 MG chewable tablet REORDER    pravastatin (PRAVACHOL) 20 MG tablet REORDER    metoprolol tartrate (LOPRESSOR) 50 MG tablet REORDER    albuterol sulfate HFA (VENTOLIN HFA) 108 (90 Base) MCG/ACT inhaler REORDER    lisinopril (PRINIVIL;ZESTRIL) 5 MG tablet REORDER       Anthony received counseling on the following healthy behaviors: nutrition, exercise and medication adherence    Discussed use,benefit, and side effects of prescribed medications. Barriers to medication compliance addressed. All patient questions answered. Pt voiced understanding. Return in about 6 months (around 3/21/2022).         Disclaimer: Some orall of this note was transcribed using voice-recognition software. This may cause typographical errors occasionally. Although all effort is made to fix these errors, please do not hesitate to contact our office if there Walter Manrique concern with the understanding of this note.

## 2021-09-21 NOTE — PROGRESS NOTES
Attending Physician Statement  I have discussed the care of Tatum Arreola 78 y.o. male, including pertinent history and exam findings, with the resident Dr. Abhilash Mckeon MD.    History and Exam:   Chief Complaint   Patient presents with    Diabetes     follow up     Results     urine test        Past Medical History:   Diagnosis Date    Acute coronary syndrome ([facility]     CAD (coronary artery disease)     CABG X 2 2/24/2012    Chronic diastolic congestive heart failure ([facility] 7/6/2016    Essential hypertension 12/9/2016    Hyperlipidemia 6/28/2012    Kidney stones     Metabolic syndrome     pre diabetes    Obesity 9/63/8692    Umbilical hernia      No Known Allergies   I have seen and examined the patient and the key elements of the encounter have been performed by me. BP Readings from Last 3 Encounters:   09/21/21 122/70   06/21/21 119/70   12/17/20 124/73     /70 (Site: Right Upper Arm, Position: Sitting, Cuff Size: Medium Adult)   Pulse 52   Ht 5' 10\" (1.778 m)   Wt 255 lb 3.2 oz (115.8 kg)   BMI 36.62 kg/m²   Lab Results   Component Value Date    WBC 8.2 06/18/2012    HGB 14.9 06/18/2012    HCT 43.9 06/18/2012     06/18/2012    CHOL 126 09/07/2021    TRIG 116 09/07/2021    HDL 39 (L) 09/07/2021    ALT 20 03/22/2018    AST 16 06/05/2017     09/07/2021    K 4.4 09/07/2021     09/07/2021    CREATININE 0.77 09/07/2021    BUN 14 09/07/2021    CO2 21 09/07/2021    PSA 0.35 09/09/2019    INR 1.0 02/24/2012    LABA1C 7.7 09/21/2021    LABMICR 6 09/07/2021     Lab Results   Component Value Date    LABPROT 6.8 12/03/2012    LABALBU 4.4 09/01/2015     No results found for: IRON, TIBC, FERRITIN  Lab Results   Component Value Date    LDLCHOLESTEROL 64 09/07/2021     I agree with the assessment, plan and the diagnosis of    Diagnosis Orders   1. Type 2 diabetes mellitus without complication, without long-term current use of insulin (HCC)  POCT glycosylated hemoglobin (Hb A1C)   2. Essential hypertension     3. Coronary artery disease due to atherosclerosis  metoprolol tartrate (LOPRESSOR) 50 MG tablet    pravastatin (PRAVACHOL) 20 MG tablet   4. Need for vaccination  INFLUENZA, QUADV, 3 YRS AND OLDER, IM PF, PREFILL SYR OR SDV, 0.5ML (AFLURIA QUADV, PF)    . I agree with orders as documented by the resident. More than 25 minutes spent  in face to face encounter with the patient and more than half in counseling. Patient's questions were answered. Patient Voiced understanding to the counseling. Return in about 3 months (around 12/21/2021) for Diabetes mellitus.    (GC Modifier)-Dr. Anabel Rogel MD

## 2021-09-21 NOTE — PATIENT INSTRUCTIONS
Thank you for letting us take care of you today. We hope all your questions were addressed. If a question was overlooked or something else comes to mind after you return home, please contact a member of your Care Team listed below. Your Care Team at [place] [address] is Team #4  Hardik Coombs MD (Faculty)  Gerson Alcocer MD (Resident)  Adin Hassan MD (Resident)  Alisa Cotto MD (Resident)  Stuart Zafar MD (Resident)  LLUVIA Shankar, JESSI Garcia., Tanya Dunne., [facility] office)  Letty Diallo, [address] (Clinical Practice Manager)  Ruben Marie, [address] [place] [address] (Clinical Pharmacist)       Office phone number: [phone]    If you need to get in right away due to illness, please be advised we have \"Same Day\" appointments available Monday-Friday. Please call us at [phone] option #[phone] to schedule your \"Same Day\" appointment.

## 2021-10-25 DIAGNOSIS — E11.9 TYPE 2 DIABETES MELLITUS WITHOUT COMPLICATION, WITHOUT LONG-TERM CURRENT USE OF INSULIN (HCC): ICD-10-CM

## 2021-11-03 ENCOUNTER — OFFICE VISIT (OUTPATIENT)
Dept: PODIATRY | Age: 71
End: 2021-11-03
Payer: MEDICARE

## 2021-11-03 VITALS — HEIGHT: 70 IN | BODY MASS INDEX: 36.94 KG/M2 | WEIGHT: 258 LBS

## 2021-11-03 DIAGNOSIS — E11.51 TYPE II DIABETES MELLITUS WITH PERIPHERAL CIRCULATORY DISORDER (HCC): Primary | ICD-10-CM

## 2021-11-03 DIAGNOSIS — M79.604 PAIN IN BOTH LOWER EXTREMITIES: ICD-10-CM

## 2021-11-03 DIAGNOSIS — B35.1 DERMATOPHYTOSIS OF NAIL: ICD-10-CM

## 2021-11-03 DIAGNOSIS — I73.9 PERIPHERAL VASCULAR DISORDER (HCC): ICD-10-CM

## 2021-11-03 DIAGNOSIS — M79.605 PAIN IN BOTH LOWER EXTREMITIES: ICD-10-CM

## 2021-11-03 PROCEDURE — 11721 DEBRIDE NAIL 6 OR MORE: CPT | Performed by: PODIATRIST

## 2021-11-03 NOTE — PROGRESS NOTES
[facility] PHYSICIANS  [facility]  [address] [place] [address]  Dept: [phone]  Dept Fax: [phone]    DIABETIC PROGRESS NOTE  Date of patient's visit: 11/3/2021  Patient's Name:  Aidan Lassiter. YOB: 1950            Patient Care Team:  Jc Stern MD as PCP - General (Family Medicine)  Willam Tran MD as PCP - Cardiology (Internal Medicine)  Hailey Goldstein MD as PCP - REHABILITATION [facility] Provider  Bailey Jansen MD as Referring Physician (Cardiology)  Rogerio García MD as Surgeon (Cardiothoracic Surgery)  Alee Jain DO (Podiatry)  Heron Boles DPM as Physician (Podiatry)          Chief Complaint   Patient presents with   Delbert Lasso Diabetes     [facility] [place]     Peripheral Neuropathy       Subjective:   Aidan Lassiter. comes to clinic for Diabetes [facility] [place] ) and Peripheral Neuropathy    he is a diabetic and states that needs toenails tirmmed. Pt currently has complaint of thickened, elongated nails that they cannot manage by themselves. Pt's primary care physician is Jc Stern MD last seen 9/21/21   Pt's last blood sugar/a1c was 7.7-9/21/21. Lab Results   Component Value Date    LABA1C 7.7 09/21/2021      Complains of numbness in the feet bilat. Past Medical History:   Diagnosis Date    Acute coronary syndrome (HCC)     CAD (coronary artery disease)     CABG X 2 2/24/2012    Chronic diastolic congestive heart failure ([address] 7/6/2016    Essential hypertension 12/9/2016    Hyperlipidemia 6/28/2012    Kidney stones     Metabolic syndrome     pre diabetes    Obesity 9/70/5230    Umbilical hernia        No Known Allergies  Current Outpatient Medications on File Prior to Visit   Medication Sig Dispense Refill    metFORMIN (GLUCOPHAGE) 1000 MG tablet TAKE ONE TABLET BY MOUTH TWICE A DAY WITH MEALS 30 tablet 0    lisinopril (PRINIVIL;ZESTRIL) 5 MG tablet lisinopril 5 mg tablet   Take 1 tablet every day by oral route.  30 tablet 5    albuterol sulfate HFA (VENTOLIN HFA) 108 (90 Base) MCG/ACT inhaler Inhale 2 puffs into the lungs every 6 hours as needed for Wheezing 1 each 5    metoprolol tartrate (LOPRESSOR) 50 MG tablet Take 1 tablet by mouth 2 times daily 60 tablet 3    aspirin (ASPIRIN LOW DOSE) 81 MG chewable tablet CHEW ONE TABLET BY MOUTH DAILY 30 tablet 3    pravastatin (PRAVACHOL) 20 MG tablet Take 1 tablet by mouth daily 90 tablet 3    glucose monitoring (FREESTYLE FREEDOM) kit 1 kit by Does not apply route daily 1 kit 0    Lancets MISC 1 each by Does not apply route 4 times daily 600 each 1    blood glucose monitor strips Test 3 times a day. Dispense sufficient amount for indicated testing frequency plus additional to accommodate PRN testing needs. 100 strip 1    ammonium lactate (LAC-HYDRIN) 12 % cream Apply topically as needed. 1 Bottle 4    Blood Glucose Monitoring Suppl (BLOOD GLUCOSE METER) KIT Test 2 times daily Diagnosis DM 1 kit 0    Alcohol Swabs (ALCOHOL PREP) 70 % PADS 1 each by Does not apply route 2 times daily. Test 2 times daily. Diagnosis  each 11     No current facility-administered medications on file prior to visit. Review of Systems    Review of Systems:   History obtained from chart review and the patient  General ROS: negative for - chills, fatigue, fever, night sweats or weight gain  Constitutional: Negative for chills, diaphoresis, fatigue, fever and unexpected weight change. Musculoskeletal: Positive for arthralgias, gait problem and joint swelling. Neurological ROS: negative for - behavioral changes, confusion, headaches or seizures. Negative for weakness and numbness. Dermatological ROS: negative for - mole changes, rash  Cardiovascular: Negative for leg swelling. Gastrointestinal: Negative for constipation, diarrhea, nausea and vomiting. Objective:  Dermatologic Exam:  Skin lesion/ulceration Absent . Skin No rashes or nodules noted. .   Skin is thin, with flaky sloughing skin as well as decreased hair growth to the lower leg  Small red hemosiderin deposits seen dorsal foot   Musculoskeletal:     1st MPJ ROM decreased, Bilateral.  Muscle strength 5/5, Bilateral.  Pain present upon palpation of toenails 1-5, Bilateral. decreased medial longitudinal arch, Bilateral.  Ankle ROM decreased,Bilateral.    Dorsally contracted digits present digits 2, Bilateral.     Vascular: DP pulses 1/4 bilateral.  PT pulses 0/4 bilateral.   CFT <5 seconds, Bilateral.  Hair growth absent to the level of the digits, Bilateral.  Edema present, Bilateral.  Varicosities absent, Bilateral. Erythema absent, Bilateral    Neurological: Sensation diminshed to light touch to level of digits, Bilateral.  Protective sensation intact 6/10 sites via 5.07/10g Metz-Juliette Monofilament, Bilateral.  negative Tinel's, Bilateral.  negative Valleix sign, Bilateral.      Integument: Warm, dry, supple, Bilateral.  Open lesion absent, Bilateral.  Interdigital maceration absent to web spaces 4, Bilateral.  Nails 1-5 left and 1-5 right thickened > 3.0 mm, dystrophic and crumbly, discolored with subungual debris. Fissures absent, Bilateral.   General: AAO x 3 in NAD.     Derm  Toenail Description  Sites of Onychomycosis Involvement (Check affected area)  [x] [x] [x] [x] [x] [x] [x] [x] [x] [x]  5 4 3 2 1 1 2 3 4 5                          Right                                        Left    Thickness  [x] [x] [x] [x] [x] [x] [x] [x] [x] [x]  5 4 3 2 1 1 2 3 4 5                         Right                                        Left    Dystrophic Changes   [x] [x] [x] [x] [x] [x] [x] [x] [x] [x]  5 4 3 2 1 1 2 3 4 5                         Right                                        Left    Color   [x] [x] [x] [x] [x] [x] [x] [x] [x] [x]  5 4 3 2 1 1 2 3 4 5                          Right                                        Left    Incurvation/Ingrowin   [] [] [] [] [] [] [] [] [] []  5 4 3 2 1 1 2 3 4 5 Right                                        Left    Inflammation/Pain   [x] [x] [x] [x] [x] [x] [x] [x] [x] [x]  5 4 3 2 1 1 2 3 4 5                         Right                                        Left        Visual inspection:  Deformity: hammertoe deformity americo feet  amputation: absent  Skin lesions: absent  Edema: right- 2+ pitting edema, left- 2+ pitting edema    Sensory exam:  Monofilament sensation: abnormal - 6/10 via SW 5.07/10g monofilament to the plantar foot bilateral feet    Pulses: abnormal - 1/4 dorsalis pedis pulse and 0/4 Posterior tibial pulse,   Pinprick: Impaired  Proprioception: Impaired  Vibration (128 Hz): Impaired       DM with PVD       [x]Yes    []No      Assessment:  73 y.o. male with:   Diagnosis Orders   1. Type II diabetes mellitus with peripheral circulatory disorder (HCC)  74044 - IN DEBRIDEMENT OF NAILS, 6 OR MORE    HM DIABETES FOOT EXAM   2. Dermatophytosis of nail  99736 - IN DEBRIDEMENT OF NAILS, 6 OR MORE    HM DIABETES FOOT EXAM   3. Peripheral vascular disorder (HCC)  76103 - IN DEBRIDEMENT OF NAILS, 6 OR MORE    HM DIABETES FOOT EXAM   4. Pain in both lower extremities  91369 - IN DEBRIDEMENT OF NAILS, 6 OR MORE    HM DIABETES FOOT EXAM           Q7   []Yes    []No                Q8   [x]Yes    []No                     Q9   []Yes    []No    Plan:   Pt was evaluated and examined. Patient was given personalized discharge instructions. Nails 1-10 were debrided sharply in length and thickness with a nipper and , without incident. Pt will follow up in 9 weeks or sooner if any problems arise. Diagnosis was discussed with the pt and all of their questions were answered in detail. Proper foot hygiene and care was discussed with the pt. Informed patient on proper diabetic foot care and importance of tight glycemic control. Patient to check feet daily and contact the office with any questions/problems/concerns.    Other comorbidity noted and will be managed by PCP.   11/3/2021    Electronically signed by Lonny Salazar DPM on 11/3/2021 at 9:05 AM  11/3/2021

## 2021-11-03 NOTE — PATIENT INSTRUCTIONS
Schedule a Vaccine  When you qualify to receive the vaccine, call the [facility]) COVID-19 Vaccination Hotline to schedule your appointment or to get additional information about the [facility]) locations which are offering the COVID-19 vaccine. To be 94% effective, it's important that you receive two doses of one of the COVID-19 vaccines. -If you are receiving the Escoto Peter vaccine, your second shot will be scheduled as close to 21 days after the first shot as possible. -If you are receiving the Moderna vaccine, your second shot will be scheduled as close to 28 days after the first shot as possible. [facility]) COVID-19 Vaccination Hotline: [phone]    Links to [facility]) website and [facility] website:    [email or web address]/[facility]-monitoring-[email or web address]-covid-19/covid-19-vaccine/[email or web address]/[email or web address]-[email or web address]    [email or web address]

## 2021-11-10 ENCOUNTER — OFFICE VISIT (OUTPATIENT)
Dept: FAMILY MEDICINE CLINIC | Age: 71
End: 2021-11-10
Payer: MEDICARE

## 2021-11-10 ENCOUNTER — IMMUNIZATION (OUTPATIENT)
Dept: FAMILY MEDICINE CLINIC | Age: 71
End: 2021-11-10
Payer: MEDICARE

## 2021-11-10 VITALS
SYSTOLIC BLOOD PRESSURE: 126 MMHG | HEART RATE: 56 BPM | OXYGEN SATURATION: 98 % | WEIGHT: 256 LBS | TEMPERATURE: 97.7 F | DIASTOLIC BLOOD PRESSURE: 82 MMHG | BODY MASS INDEX: 36.73 KG/M2

## 2021-11-10 DIAGNOSIS — H91.93 DECREASED HEARING OF BOTH EARS: ICD-10-CM

## 2021-11-10 DIAGNOSIS — E11.9 TYPE 2 DIABETES MELLITUS WITHOUT COMPLICATION, WITHOUT LONG-TERM CURRENT USE OF INSULIN (HCC): Primary | ICD-10-CM

## 2021-11-10 LAB — HBA1C MFR BLD: 9.1 %

## 2021-11-10 PROCEDURE — 0004A COVID-19, PFIZER VACCINE 30MCG/0.3ML DOSE: CPT | Performed by: INTERNAL MEDICINE

## 2021-11-10 PROCEDURE — 99214 OFFICE O/P EST MOD 30 MIN: CPT | Performed by: STUDENT IN AN ORGANIZED HEALTH CARE EDUCATION/TRAINING PROGRAM

## 2021-11-10 PROCEDURE — 91300 COVID-19, PFIZER VACCINE 30MCG/0.3ML DOSE: CPT | Performed by: INTERNAL MEDICINE

## 2021-11-10 RX ORDER — ATORVASTATIN CALCIUM 40 MG/1
40 TABLET, FILM COATED ORAL DAILY
COMMUNITY

## 2021-11-10 SDOH — ECONOMIC STABILITY: FOOD INSECURITY: WITHIN THE PAST 12 MONTHS, YOU WORRIED THAT YOUR FOOD WOULD RUN OUT BEFORE YOU GOT MONEY TO BUY MORE.: NEVER TRUE

## 2021-11-10 SDOH — ECONOMIC STABILITY: FOOD INSECURITY: WITHIN THE PAST 12 MONTHS, THE FOOD YOU BOUGHT JUST DIDN'T LAST AND YOU DIDN'T HAVE MONEY TO GET MORE.: NEVER TRUE

## 2021-11-10 ASSESSMENT — ENCOUNTER SYMPTOMS
VOMITING: 0
NAUSEA: 0
COLOR CHANGE: 0
TROUBLE SWALLOWING: 0
ABDOMINAL PAIN: 0

## 2021-11-10 ASSESSMENT — SOCIAL DETERMINANTS OF HEALTH (SDOH): HOW HARD IS IT FOR YOU TO PAY FOR THE VERY BASICS LIKE FOOD, HOUSING, MEDICAL CARE, AND HEATING?: NOT HARD AT ALL

## 2021-11-10 NOTE — PROGRESS NOTES
Subjective:  Brooke Oliveira presents for   Chief Complaint   Patient presents with    New Patient    Medication Refill    Other     Wants order for glucometer        Here to establish care as a new patient. Has history of diabetes and is on Metformin. Previous a1c reviewed from September and June were 7.7%. His other labs like lipid panel, cmp, and CBC were within normal limits. Does have hearing loss with hearing aids. Has not had follow up for many years. Patient Active Problem List   Diagnosis    CAD (coronary artery disease)    Bypass graft stenosis (HCC)    Need for pneumococcal vaccine    Metabolic syndrome    Prediabetes    Dyslipidemia    Metabolic disorder    Obesity    Chronic diastolic congestive heart failure (HCC)    Pre-diabetes    Essential hypertension    Diabetes mellitus , without long-term current use of insulin (HCC)    Anemia    Chronic coronary artery disease    Acute on chronic diastolic congestive heart failure (HCC)    Other hyperlipidemia    Hypocalcemia    Severe obesity (BMI 35.0-39. 9) with comorbidity ([address]    Hypertrophy of prostate with urinary obstruction    History of kidney stones    Type 2 diabetes mellitus without complication, without long-term current use of insulin (HCC)    Cellulitis of left upper extremity    Decreased hearing of both ears         Review of Systems   Constitutional: Negative for appetite change, chills, fatigue and fever. HENT: Positive for hearing loss. Negative for congestion and trouble swallowing. Cardiovascular: Negative. Gastrointestinal: Negative for abdominal pain, nausea and vomiting. Musculoskeletal: Negative for arthralgias. Skin: Negative for color change. Neurological: Negative for dizziness and headaches. Psychiatric/Behavioral: Negative for behavioral problems and confusion. The patient is not nervous/anxious.          Objective:  Physical Exam   Vitals:   Vitals:    11/10/21 0815   BP: 126/82   Site: Left Upper Arm   Position: Sitting   Cuff Size: Large Adult   Pulse: 56   Temp: 97.7 °F (36.5 °C)   TempSrc: Temporal   SpO2: 98%   Weight: 256 lb (116.1 kg)     Wt Readings from Last 3 Encounters:   11/10/21 256 lb (116.1 kg)   11/03/21 258 lb (117 kg)   09/21/21 255 lb 3.2 oz (115.8 kg)     Ht Readings from Last 3 Encounters:   11/03/21 5' 10\" (1.778 m)   09/21/21 5' 10\" (1.778 m)   09/01/21 5' 10\" (1.778 m)     Body mass index is 36.73 kg/m². Physical Exam  Vitals reviewed. Constitutional:       General: He is not in acute distress. Appearance: Normal appearance. HENT:      Mouth/Throat:      Mouth: Mucous membranes are moist.   Eyes:      Conjunctiva/sclera: Conjunctivae normal.   Cardiovascular:      Rate and Rhythm: Normal rate and regular rhythm. Heart sounds: Normal heart sounds. Pulmonary:      Effort: Pulmonary effort is normal.      Breath sounds: Normal breath sounds. Abdominal:      General: Abdomen is flat. Bowel sounds are normal.      Palpations: Abdomen is soft. Skin:     General: Skin is warm and dry. Neurological:      Mental Status: He is alert and oriented to person, place, and time. Psychiatric:         Mood and Affect: Mood normal.            Assessment/Plan:    Diagnosis Orders   1. Type 2 diabetes mellitus without complication, without long-term current use of insulin ([facility])  metFORMIN (GLUCOPHAGE) 1000 MG tablet    dapagliflozin (FARXIGA) 5 MG tablet    POCT glycosylated hemoglobin (Hb A1C)   2. Decreased hearing of both ears  Ambulatory referral to Audiology        Return in about 3 months (around 2/10/2022) for Diabetes. Diabetes:  Results for orders placed or performed in visit on 11/10/21   POCT glycosylated hemoglobin (Hb A1C)   Result Value Ref Range    Hemoglobin A1C 9.1 %     A1c is not at goal and has worsened compared to September a1c of 7.7%. I will go ahead and refill his metformin and add farxiga 5 mg given elevation in a1c.  He does visit ophthalmology and podiatry for check up on retinopathy and neuropathy. No need for labs as he had these done and they were within normal limits. Hearing Loss- Will send to audiology for reassessment of his hearing aids.

## 2021-11-15 ENCOUNTER — TELEPHONE (OUTPATIENT)
Dept: PHARMACY | Facility: CLINIC | Age: 71
End: 2021-11-15

## 2021-11-15 NOTE — TELEPHONE ENCOUNTER
[facility] CLINICAL PHARMACY REVIEW: ADHERENCE REVIEW  Identified care gap per [employer]; fills at [facility]: Diabetes and Statin adherence    Last Visit: 11/10/21    Patient also appears to be prescribed: Roselee Bright and lisinopril    Patient found in Outcomes MTM and is not currently eligible for CMR/TIP    ASSESSMENT  ACE/ARB ADHERENCE    Per Insurance Records through 10/24/21: no claims to review     Per [facility] Records:  Lisinopril last filled on 10/12/21 for 90 day supply. BP Readings from Last 3 Encounters:   11/10/21 126/82   09/21/21 122/70   06/21/21 119/70     Estimated Creatinine Clearance: 116 mL/min (based on SCr of 0.77 mg/dL). DIABETES ADHERENCE    Per Insurance Records through 10/24/21 ([address] [facility] [place] = 99%; YTD PDC = 86%; Potential Fail Date: 11/21/21): Metformin last filled on 9/20/21 for 15 day supply. Next refill due: 10/5/21    Per chart review: patient established care with new PCP and Roselee Bright was added at [address] on 11/10/21. Per [facility] Records:  Roselee Bright last filled on 11/10/21 for 30 day supply. Metformin last filled on 11/10/21 for 30 day supply. Lab Results   Component Value Date    LABA1C 9.1 11/10/2021    LABA1C 7.7 09/21/2021    LABA1C 7.7 06/21/2021     NOTE A1c >9%; increased d/t needing to establish care with new PCP and being out of metformin therapy. [facility]    Per Insurance Records through 10/24/21 ([address] [place] = 92%; YTD PDC = 96%; Passed in 2021): Atorvastatin last filled on 10/12/21 for 90 day supply.  Next refill due: 1/18/22    Lab Results   Component Value Date    CHOL 126 09/07/2021    TRIG 116 09/07/2021    HDL 39 (L) 09/07/2021    LDLCHOLESTEROL 64 09/07/2021     ALT   Date Value Ref Range Status   03/22/2018 20 5 - 41 U/L Final     Comment:     [facility] [id number] [place] [address], [address] [place] [address] [phone]     AST   Date Value Ref Range Status   06/05/2017 16 <40 U/L Final     Comment:     [facility] [address] [facility], [address] [place] [address] [phone] The ASCVD Risk score (Adilene Hernadez, et al., 2013) failed to calculate for the following reasons: The valid total cholesterol range is 130 to 320 mg/dL     PLAN  Reached patient to review. Patient states that he picked up metformin and is taking new medication Farxiga. No issues to report with new medication. Taking daily and states that everything is going well. Thankful for the f/u. Will sign off.      Future Appointments   Date Time Provider Sera Yadav   2022  8:45 AM Carri Perez DPM [facility]   2022  8:30 AM MD Berhane Marie [facility]       Ramiro Marshall, PharmD, [place] // Department, toll free [phone], option 1       For Pharmacy [address] in place:  No   Recommendation Provided To: Patient/Caregiver: 1 via Telephone   Intervention Detail: Adherence Monitorin   Gap Closed?: Yes    Intervention Accepted By: Patient/Caregiver: 1   Time Spent (min): 15

## 2022-01-05 ENCOUNTER — OFFICE VISIT (OUTPATIENT)
Dept: PODIATRY | Age: 72
End: 2022-01-05
Payer: MEDICARE

## 2022-01-05 VITALS — BODY MASS INDEX: 36.65 KG/M2 | HEIGHT: 70 IN | WEIGHT: 256 LBS | RESPIRATION RATE: 16 BRPM

## 2022-01-05 DIAGNOSIS — M79.604 PAIN IN BOTH LOWER EXTREMITIES: ICD-10-CM

## 2022-01-05 DIAGNOSIS — B35.1 DERMATOPHYTOSIS OF NAIL: ICD-10-CM

## 2022-01-05 DIAGNOSIS — M79.605 PAIN IN BOTH LOWER EXTREMITIES: ICD-10-CM

## 2022-01-05 DIAGNOSIS — E11.9 TYPE 2 DIABETES MELLITUS WITHOUT COMPLICATION, WITHOUT LONG-TERM CURRENT USE OF INSULIN (HCC): Primary | ICD-10-CM

## 2022-01-05 PROCEDURE — 11721 DEBRIDE NAIL 6 OR MORE: CPT | Performed by: PODIATRIST

## 2022-01-05 NOTE — PROGRESS NOTES
[address] [facility] PODIATRY [facility]  [address] [address]  Dept: [phone]  Dept Fax: [phone]    DIABETIC PROGRESS NOTE  Date of patient's visit: 1/5/2022  Patient's Name:  Xena Fajardo. YOB: 1950            Patient Care Team:  Leila Jarvis MD as PCP - General (Family Medicine)  Errol Giraldo MD as PCP - Cardiology (Internal Medicine)  Leila Jarvis MD as PCP - [facility]  Geni English MD as Referring Physician (Cardiology)  Karyle Lazier, MD as Surgeon (Cardiothoracic Surgery)  Quinton Chacon DO (Podiatry)  Sadiq Lechuga DPM as Physician (Podiatry)          Chief Complaint   Patient presents with    Diabetes    Nail Problem       Subjective:   Xena Fajardo. comes to clinic for Diabetes and Nail Problem    he is a diabetic and states that he is doing well. Pt currently has complaint of thickened, elongated nails that they cannot manage by themselves. Pt's primary care physician is Leila Jarvis MD last seen 11/10/2021   Pt's last blood sugar was unknown. Lab Results   Component Value Date    LABA1C 9.1 11/10/2021      Complains of numbness in the feet bilat.   Past Medical History:   Diagnosis Date    Acute coronary syndrome ([address]     CAD (coronary artery disease)     CABG X 2 2/24/2012    Chronic diastolic congestive heart failure ([address] 7/6/2016    Essential hypertension 12/9/2016    Hyperlipidemia 6/28/2012    Kidney stones     Metabolic syndrome     pre diabetes    Obesity 0/40/1579    Umbilical hernia        No Known Allergies  Current Outpatient Medications on File Prior to Visit   Medication Sig Dispense Refill    atorvastatin (LIPITOR) 40 MG tablet Take 40 mg by mouth daily      metFORMIN (GLUCOPHAGE) 1000 MG tablet TAKE ONE TABLET BY MOUTH TWICE A DAY WITH MEALS 60 tablet 3    dapagliflozin (FARXIGA) 5 MG tablet Take 1 tablet by mouth every morning 30 tablet 2    lisinopril (PRINIVIL;ZESTRIL) 5 MG tablet lisinopril 5 mg tablet   Take 1 tablet every day by oral route. 30 tablet 5    metoprolol tartrate (LOPRESSOR) 50 MG tablet Take 1 tablet by mouth 2 times daily (Patient taking differently: Take 25 mg by mouth 2 times daily 25mg 2 times a day) 60 tablet 3    aspirin (ASPIRIN LOW DOSE) 81 MG chewable tablet CHEW ONE TABLET BY MOUTH DAILY 30 tablet 3     No current facility-administered medications on file prior to visit. Review of Systems    Review of Systems:   History obtained from chart review and the patient  General ROS: negative for - chills, fatigue, fever, night sweats or weight gain  Constitutional: Negative for chills, diaphoresis, fatigue, fever and unexpected weight change. Musculoskeletal: Positive for arthralgias, gait problem and joint swelling. Neurological ROS: negative for - behavioral changes, confusion, headaches or seizures. Negative for weakness and numbness. Dermatological ROS: negative for - mole changes, rash  Cardiovascular: Negative for leg swelling. Gastrointestinal: Negative for constipation, diarrhea, nausea and vomiting. Objective:  Dermatologic Exam:  Skin lesion/ulceration Absent . Skin No rashes or nodules noted. .   Skin is thin, with flaky sloughing skin as well as decreased hair growth to the lower leg  Small red hemosiderin deposits seen dorsal foot   Musculoskeletal:     1st MPJ ROM decreased, Bilateral.  Muscle strength 5/5, Bilateral.  Pain present upon palpation of toenails 1-5, Bilateral. decreased medial longitudinal arch, Bilateral.  Ankle ROM decreased,Bilateral.    Dorsally contracted digits present digits 2, Bilateral.     Vascular: DP pulses 1/4 bilateral.  PT pulses 0/4 bilateral.   CFT <5 seconds, Bilateral.  Hair growth absent to the level of the digits, Bilateral.  Edema present, Bilateral.  Varicosities absent, Bilateral. Erythema absent, Bilateral    Neurological: Sensation diminshed to light touch to level of digits, Bilateral.  Protective sensation intact 6/10 sites via 5.07/10g [place] Monofilament, Bilateral.  negative Tinel's, Bilateral.  negative Valleix sign, Bilateral.      Integument: Warm, dry, supple, Bilateral.  Open lesion absent, Bilateral.  Interdigital maceration absent to web spaces 4, Bilateral.  Nails 1-5 left and 1-5 right thickened > 3.0 mm, dystrophic and crumbly, discolored with subungual debris. Fissures absent, Bilateral.   General: AAO x 3 in NAD. Derm  Toenail Description  Sites of Onychomycosis Involvement (Check affected area)  [x] [x] [x] [x] [x] [x] [x] [x] [x] [x]  5 4 3 2 1 1 2 3 4 5                          Right                                        Left    Thickness  [x] [x] [x] [x] [x] [x] [x] [x] [x] [x]  5 4 3 2 1 1 2 3 4 5                         Right                                        Left    Dystrophic Changes   [x] [x] [x] [x] [x] [x] [x] [x] [x] [x]  5 4 3 2 1 1 2 3 4 5                         Right                                        Left    Color   [x] [x] [x] [x] [x] [x] [x] [x] [x] [x]  5 4 3 2 1 1 2 3 4 5                          Right                                        Left    Incurvation/Ingrowin   [] [] [] [] [] [] [] [] [] []  5 4 3 2 1 1 2 3 4 5                         Right                                        Left    Inflammation/Pain   [x] [x] [x] [x] [x] [x] [x] [x] [x] [x]  5 4 3 2 1 1 2 3 4 5                         Right                                        Left        Visual inspection:  Deformity: hammertoe deformity americo feet  amputation: absent  Skin lesions: absent  Edema: right- 2+ pitting edema, left- 2+ pitting edema    Sensory exam:  Monofilament sensation: abnormal - 6/10 via SW 5.07/10g monofilament to the plantar foot bilateral feet    Pulses: abnormal - 1/4 dorsalis pedis pulse and 0/4 Posterior tibial pulse,   Pinprick: Impaired  Proprioception: Impaired  Vibration (128 Hz):  Impaired       DM with PVD [x]Yes    []No      Assessment:  73 y.o. male with:   Diagnosis Orders   1. Type 2 diabetes mellitus without complication, without long-term current use of insulin (HCC)  HM DIABETES FOOT EXAM    51435 - SC DEBRIDEMENT OF NAILS, 6 OR MORE    Misc. Devices MISC   2. Dermatophytosis of nail   DIABETES FOOT EXAM    97925 - SC DEBRIDEMENT OF NAILS, 6 OR MORE   3. Pain in both lower extremities  Misc. Devices MISC           Q7   []Yes    []No                Q8   [x]Yes    []No                     Q9   []Yes    []No    Plan:   Pt was evaluated and examined. Patient was given personalized discharge instructions. Nails 1-10 were debrided sharply in length and thickness with a nipper and , without incident. Pt will follow up in 9 weeks or sooner if any problems arise. Diagnosis was discussed with the pt and all of their questions were answered in detail. Proper foot hygiene and care was discussed with the pt. Informed patient on proper diabetic foot care and importance of tight glycemic control. Patient to check feet daily and contact the office with any questions/problems/concerns.    Other comorbidity noted and will be managed by PCP.  1/5/2022    Electronically signed by Anh Russell DPM on 1/5/2022 at 8:41 AM  1/5/2022

## 2022-02-10 ENCOUNTER — OFFICE VISIT (OUTPATIENT)
Dept: FAMILY MEDICINE CLINIC | Age: 72
End: 2022-02-10
Payer: MEDICARE

## 2022-02-10 VITALS
OXYGEN SATURATION: 97 % | HEART RATE: 55 BPM | BODY MASS INDEX: 35.58 KG/M2 | DIASTOLIC BLOOD PRESSURE: 70 MMHG | TEMPERATURE: 97 F | WEIGHT: 248 LBS | SYSTOLIC BLOOD PRESSURE: 114 MMHG

## 2022-02-10 DIAGNOSIS — E66.01 SEVERE OBESITY (BMI 35.0-39.9) WITH COMORBIDITY (HCC): ICD-10-CM

## 2022-02-10 DIAGNOSIS — I50.32 CHRONIC DIASTOLIC CONGESTIVE HEART FAILURE (HCC): ICD-10-CM

## 2022-02-10 DIAGNOSIS — E11.9 TYPE 2 DIABETES MELLITUS WITHOUT COMPLICATION, WITHOUT LONG-TERM CURRENT USE OF INSULIN (HCC): Primary | ICD-10-CM

## 2022-02-10 DIAGNOSIS — Z12.11 SCREENING FOR MALIGNANT NEOPLASM OF COLON: ICD-10-CM

## 2022-02-10 LAB — HBA1C MFR BLD: 7 %

## 2022-02-10 PROCEDURE — 99214 OFFICE O/P EST MOD 30 MIN: CPT | Performed by: STUDENT IN AN ORGANIZED HEALTH CARE EDUCATION/TRAINING PROGRAM

## 2022-02-10 PROCEDURE — 83036 HEMOGLOBIN GLYCOSYLATED A1C: CPT | Performed by: STUDENT IN AN ORGANIZED HEALTH CARE EDUCATION/TRAINING PROGRAM

## 2022-02-10 PROCEDURE — 3051F HG A1C>EQUAL 7.0%<8.0%: CPT | Performed by: STUDENT IN AN ORGANIZED HEALTH CARE EDUCATION/TRAINING PROGRAM

## 2022-02-10 ASSESSMENT — PATIENT HEALTH QUESTIONNAIRE - PHQ9
SUM OF ALL RESPONSES TO PHQ QUESTIONS 1-9: 0
SUM OF ALL RESPONSES TO PHQ9 QUESTIONS 1 & 2: 0
2. FEELING DOWN, DEPRESSED OR HOPELESS: 0
SUM OF ALL RESPONSES TO PHQ QUESTIONS 1-9: 0
1. LITTLE INTEREST OR PLEASURE IN DOING THINGS: 0

## 2022-02-10 ASSESSMENT — ENCOUNTER SYMPTOMS
ABDOMINAL PAIN: 0
NAUSEA: 0
VOMITING: 0
TROUBLE SWALLOWING: 0
COLOR CHANGE: 0

## 2022-02-10 NOTE — PROGRESS NOTES
Subjective:  Sonam Alert presents for   Chief Complaint   Patient presents with    3 Month Follow-Up       HPI   Here for 3-month follow-up. Last visit his A1c was 9.1. This was an elevation from his previous labs. He was placed on Farxiga 5 mg. He has been doing well without any issues. Patient Active Problem List   Diagnosis    CAD (coronary artery disease)    Bypass graft stenosis (HCC)    Need for pneumococcal vaccine    Metabolic syndrome    Prediabetes    Dyslipidemia    Metabolic disorder    Obesity    Chronic diastolic congestive heart failure (HCC)    Pre-diabetes    Essential hypertension    Diabetes mellitus , without long-term current use of insulin (HCC)    Anemia    Chronic coronary artery disease    Acute on chronic diastolic congestive heart failure (HCC)    Other hyperlipidemia    Hypocalcemia    Severe obesity (BMI 35.0-39. 9) with comorbidity ([address]    Hypertrophy of prostate with urinary obstruction    History of kidney stones    Type 2 diabetes mellitus without complication, without long-term current use of insulin ([facility])    Cellulitis of left upper extremity    Decreased hearing of both ears         Review of Systems   Constitutional: Negative for appetite change, chills, fatigue and fever. HENT: Positive for hearing loss. Negative for congestion and trouble swallowing. Cardiovascular: Negative. Gastrointestinal: Negative for abdominal pain, nausea and vomiting. Musculoskeletal: Negative for arthralgias. Skin: Negative for color change. Neurological: Negative for dizziness and headaches. Psychiatric/Behavioral: Negative for behavioral problems and confusion. The patient is not nervous/anxious.          Objective:  Physical Exam   Vitals:   Vitals:    02/10/22 0931   BP: 114/70   Site: Left Upper Arm   Position: Sitting   Cuff Size: Large Adult   Pulse: 55   Temp: 97 °F (36.1 °C)   TempSrc: Temporal   SpO2: 97%   Weight: 248 lb (112.5 kg)     Wt Readings from Last 3 Encounters:   02/10/22 248 lb (112.5 kg)   01/05/22 256 lb (116.1 kg)   11/10/21 256 lb (116.1 kg)     Ht Readings from Last 3 Encounters:   01/05/22 5' 10\" (1.778 m)   11/03/21 5' 10\" (1.778 m)   09/21/21 5' 10\" (1.778 m)     Body mass index is 35.58 kg/m². Physical Exam  Vitals reviewed. Constitutional:       General: He is not in acute distress. Appearance: Normal appearance. HENT:      Mouth/Throat:      Mouth: Mucous membranes are moist.   Eyes:      Conjunctiva/sclera: Conjunctivae normal.   Cardiovascular:      Rate and Rhythm: Normal rate and regular rhythm. Heart sounds: Normal heart sounds. Pulmonary:      Effort: Pulmonary effort is normal.      Breath sounds: Normal breath sounds. Abdominal:      General: Abdomen is flat. Bowel sounds are normal.      Palpations: Abdomen is soft. Skin:     General: Skin is warm and dry. Neurological:      Mental Status: He is alert and oriented to person, place, and time. Psychiatric:         Mood and Affect: Mood normal.            Assessment/Plan:    Diagnosis Orders   1. Type 2 diabetes mellitus without complication, without long-term current use of insulin (HCC)  POCT glycosylated hemoglobin (Hb A1C)    dapagliflozin (FARXIGA) 10 MG tablet   2. Screening for malignant neoplasm of colon  Fecal DNA Colorectal cancer screening ([facility])   3. Severe obesity (BMI 35.0-39. 9) with comorbidity ([address]     4. Chronic diastolic congestive heart failure ([address]          Return in about 6 months (around 8/10/2022) for Diabetes. Diabetes-A1c today was 7.0. This is an improvement and he is almost at goal below 7. We will increase his Freda Joel to 10 mg as this does offer benefit in terms of his chronic diastolic heart failure. We will screen for colon cancer with Cologuard. Reassess in 6 months.

## 2022-03-03 ENCOUNTER — TELEPHONE (OUTPATIENT)
Dept: FAMILY MEDICINE CLINIC | Age: 72
End: 2022-03-03

## 2022-03-03 NOTE — TELEPHONE ENCOUNTER
Called to schedule AWV patient asked that I call back in a couple weeks he has a lot going on right now.

## 2022-03-09 ENCOUNTER — OFFICE VISIT (OUTPATIENT)
Dept: PODIATRY | Age: 72
End: 2022-03-09
Payer: MEDICARE

## 2022-03-09 VITALS — RESPIRATION RATE: 16 BRPM | WEIGHT: 248 LBS | HEIGHT: 70 IN | BODY MASS INDEX: 35.5 KG/M2

## 2022-03-09 DIAGNOSIS — I73.9 PERIPHERAL VASCULAR DISORDER (HCC): ICD-10-CM

## 2022-03-09 DIAGNOSIS — E11.9 TYPE 2 DIABETES MELLITUS WITHOUT COMPLICATION, WITHOUT LONG-TERM CURRENT USE OF INSULIN (HCC): Primary | ICD-10-CM

## 2022-03-09 DIAGNOSIS — M79.604 PAIN IN BOTH LOWER EXTREMITIES: ICD-10-CM

## 2022-03-09 DIAGNOSIS — M79.605 PAIN IN BOTH LOWER EXTREMITIES: ICD-10-CM

## 2022-03-09 DIAGNOSIS — B35.1 DERMATOPHYTOSIS OF NAIL: ICD-10-CM

## 2022-03-09 PROCEDURE — 11721 DEBRIDE NAIL 6 OR MORE: CPT | Performed by: PODIATRIST

## 2022-03-09 PROCEDURE — 3051F HG A1C>EQUAL 7.0%<8.0%: CPT | Performed by: PODIATRIST

## 2022-03-09 PROCEDURE — 99999 PR OFFICE/OUTPT VISIT,PROCEDURE ONLY: CPT | Performed by: PODIATRIST

## 2022-03-09 NOTE — PROGRESS NOTES
[address] [facility] PODIATRY [facility]  [address] [facility] [address]  Dept: [phone]  Dept Fax: [phone]    DIABETIC PROGRESS NOTE  Date of patient's visit: 3/9/2022  Patient's Name:  Delicia Cobos. YOB: 1950            Patient Care Team:  Nas Merino MD as PCP - General (Family Medicine)  Jacob Eng MD as PCP - Cardiology (Internal Medicine)  Nas Merino MD as PCP - [facility] Provider  Paradise Cespedes MD as Referring Physician (Cardiology)  Emmanuel Vigil MD as Surgeon (Cardiothoracic Surgery)  Shannon Mora DO (Podiatry)  Tato Nguyen DPM as Physician (Podiatry)          Chief Complaint   Patient presents with    Diabetes    Peripheral Neuropathy    Nail Problem       Subjective:   Delicia Cobos. comes to clinic for Diabetes, Peripheral Neuropathy, and Nail Problem    he is a diabetic and states that he is doing well. Pt currently has complaint of thickened, elongated nails that they cannot manage by themselves. Pt's primary care physician is Nas Merino MD last seen 02/10/2022   Pt's last blood sugar was unknown. Lab Results   Component Value Date    LABA1C 7.0 02/10/2022      Complains of numbness in the feet bilat. Past Medical History:   Diagnosis Date    Acute coronary syndrome ([address]     CAD (coronary artery disease)     CABG X 2 2/24/2012    Chronic diastolic congestive heart failure ([address] 7/6/2016    Essential hypertension 12/9/2016    Hyperlipidemia 6/28/2012    Kidney stones     Metabolic syndrome     pre diabetes    Obesity 6/09/5179    Umbilical hernia        No Known Allergies  Current Outpatient Medications on File Prior to Visit   Medication Sig Dispense Refill    dapagliflozin (FARXIGA) 10 MG tablet Take 1 tablet by mouth every morning 90 tablet 2    Misc.  Devices MISC 1 PAIR OF DIABETIC SHOES (1 LEFT/ 1 RIGHT)  1 -3 PAIRS OF INSERTS (LEFT/ RIGHT) 2 each 0  atorvastatin (LIPITOR) 40 MG tablet Take 40 mg by mouth daily      metFORMIN (GLUCOPHAGE) 1000 MG tablet TAKE ONE TABLET BY MOUTH TWICE A DAY WITH MEALS 60 tablet 3    lisinopril (PRINIVIL;ZESTRIL) 5 MG tablet lisinopril 5 mg tablet   Take 1 tablet every day by oral route. 30 tablet 5    metoprolol tartrate (LOPRESSOR) 50 MG tablet Take 1 tablet by mouth 2 times daily (Patient taking differently: Take 25 mg by mouth 2 times daily 25mg 2 times a day) 60 tablet 3    aspirin (ASPIRIN LOW DOSE) 81 MG chewable tablet CHEW ONE TABLET BY MOUTH DAILY 30 tablet 3     No current facility-administered medications on file prior to visit. Review of Systems    Review of Systems:   History obtained from chart review and the patient  General ROS: negative for - chills, fatigue, fever, night sweats or weight gain  Constitutional: Negative for chills, diaphoresis, fatigue, fever and unexpected weight change. Musculoskeletal: Positive for arthralgias, gait problem and joint swelling. Neurological ROS: negative for - behavioral changes, confusion, headaches or seizures. Negative for weakness and numbness. Dermatological ROS: negative for - mole changes, rash  Cardiovascular: Negative for leg swelling. Gastrointestinal: Negative for constipation, diarrhea, nausea and vomiting. Objective:  Dermatologic Exam:  Skin lesion/ulceration Absent . Skin No rashes or nodules noted. .   Skin is thin, with flaky sloughing skin as well as decreased hair growth to the lower leg  Small red hemosiderin deposits seen dorsal foot   Musculoskeletal:     1st MPJ ROM decreased, Bilateral.  Muscle strength 5/5, Bilateral.  Pain present upon palpation of toenails 1-5, Bilateral. decreased medial longitudinal arch, Bilateral.  Ankle ROM decreased,Bilateral.    Dorsally contracted digits present digits 2, Bilateral.     Vascular: DP pulses 1/4 bilateral.  PT pulses 0/4 bilateral.   CFT <5 seconds, Bilateral.  Hair growth absent to the level of the digits, Bilateral.  Edema present, Bilateral.  Varicosities absent, Bilateral. Erythema absent, Bilateral    Neurological: Sensation diminshed to light touch to level of digits, Bilateral.  Protective sensation intact 6/10 sites via 5.07/10g Barton-Juliette Monofilament, Bilateral.  negative Tinel's, Bilateral.  negative Valleix sign, Bilateral.      Integument: Warm, dry, supple, Bilateral.  Open lesion absent, Bilateral.  Interdigital maceration absent to web spaces 4, Bilateral.  Nails 1-5 left and 1-5 right thickened > 3.0 mm, dystrophic and crumbly, discolored with subungual debris. Fissures absent, Bilateral.   General: AAO x 3 in NAD.     Derm  Toenail Description  Sites of Onychomycosis Involvement (Check affected area)  [x] [x] [x] [x] [x] [x] [x] [x] [x] [x]  5 4 3 2 1 1 2 3 4 5                          Right                                        Left    Thickness  [x] [x] [x] [x] [x] [x] [x] [x] [x] [x]  5 4 3 2 1 1 2 3 4 5                         Right                                        Left    Dystrophic Changes   [x] [x] [x] [x] [x] [x] [x] [x] [x] [x]  5 4 3 2 1 1 2 3 4 5                         Right                                        Left    Color   [x] [x] [x] [x] [x] [x] [x] [x] [x] [x]  5 4 3 2 1 1 2 3 4 5                          Right                                        Left    Incurvation/Ingrowin   [] [] [] [] [] [] [] [] [] []  5 4 3 2 1 1 2 3 4 5                         Right                                        Left    Inflammation/Pain   [x] [x] [x] [x] [x] [x] [x] [x] [x] [x]  5 4 3 2 1 1 2 3 4 5                         Right                                        Left        Visual inspection:  Deformity: hammertoe deformity americo feet  amputation: absent  Skin lesions: absent  Edema: right- 2+ pitting edema, left- 2+ pitting edema    Sensory exam:  Monofilament sensation: abnormal - 6/10 via SW 5.07/10g monofilament to the plantar foot bilateral feet    Pulses: abnormal - 1/4 dorsalis pedis pulse and 0/4 Posterior tibial pulse,   Pinprick: Impaired  Proprioception: Impaired  Vibration (128 Hz): Impaired       DM with PVD       [x]Yes    []No      Assessment:  73 y.o. male with:   Diagnosis Orders   1. Type 2 diabetes mellitus without complication, without long-term current use of insulin (HCC)   DIABETES FOOT EXAM    43726 - [place] DEBRIDEMENT OF NAILS, 6 OR MORE   2. Dermatophytosis of nail  HM DIABETES FOOT EXAM    56678 - [place] DEBRIDEMENT OF NAILS, 6 OR MORE   3. Pain in both lower extremities   DIABETES FOOT EXAM    56338 - [place] DEBRIDEMENT OF NAILS, 6 OR MORE   4. Peripheral vascular disorder (HCC)   DIABETES FOOT EXAM    07243 - [place] DEBRIDEMENT OF NAILS, 6 OR MORE             Q7   []Yes    []No                Q8   [x]Yes    []No                     Q9   []Yes    []No    Plan:   Pt was evaluated and examined. Patient was given personalized discharge instructions. Nails 1-10 were debrided sharply in length and thickness with a nipper and , without incident. Pt will follow up in 9 weeks or sooner if any problems arise. Diagnosis was discussed with the pt and all of their questions were answered in detail. Proper foot hygiene and care was discussed with the pt. Informed patient on proper diabetic foot care and importance of tight glycemic control. Patient to check feet daily and contact the office with any questions/problems/concerns.    Other comorbidity noted and will be managed by PCP.  3/9/2022    Electronically signed by Reginaldo Hale DPM on 3/9/2022 at 8:49 AM  3/9/2022

## 2022-03-24 DIAGNOSIS — E11.9 TYPE 2 DIABETES MELLITUS WITHOUT COMPLICATION, WITHOUT LONG-TERM CURRENT USE OF INSULIN (HCC): ICD-10-CM

## 2022-03-24 PROCEDURE — 3051F HG A1C>EQUAL 7.0%<8.0%: CPT | Performed by: STUDENT IN AN ORGANIZED HEALTH CARE EDUCATION/TRAINING PROGRAM

## 2022-03-24 NOTE — TELEPHONE ENCOUNTER
Pete Jack. is calling to request a refill on the following medication(s):    Medication Request:  Requested Prescriptions     Pending Prescriptions Disp Refills    metFORMIN (GLUCOPHAGE) 1000 MG tablet 60 tablet 5     Sig: TAKE ONE TABLET BY MOUTH TWICE A DAY WITH MEALS       Last Visit Date (If Applicable):  1/21/8484    Next Visit Date:    8/10/2022

## 2022-04-14 ENCOUNTER — TELEPHONE (OUTPATIENT)
Dept: FAMILY MEDICINE CLINIC | Age: 72
End: 2022-04-14

## 2022-04-14 NOTE — TELEPHONE ENCOUNTER
Received notification from [facility] that they have been trying to contact the patient in regards to his cologuard. Spoke with patient and stated when they have called he has not been able to take the call. Patient states he will complete cologuard.

## 2022-05-11 ENCOUNTER — OFFICE VISIT (OUTPATIENT)
Dept: PODIATRY | Age: 72
End: 2022-05-11
Payer: MEDICARE

## 2022-05-11 DIAGNOSIS — I73.9 PERIPHERAL VASCULAR DISORDER (HCC): ICD-10-CM

## 2022-05-11 DIAGNOSIS — E11.9 TYPE 2 DIABETES MELLITUS WITHOUT COMPLICATION, WITHOUT LONG-TERM CURRENT USE OF INSULIN (HCC): Primary | ICD-10-CM

## 2022-05-11 DIAGNOSIS — M79.605 PAIN IN BOTH LOWER EXTREMITIES: ICD-10-CM

## 2022-05-11 DIAGNOSIS — B35.1 DERMATOPHYTOSIS OF NAIL: ICD-10-CM

## 2022-05-11 DIAGNOSIS — M79.604 PAIN IN BOTH LOWER EXTREMITIES: ICD-10-CM

## 2022-05-11 PROCEDURE — 99999 PR OFFICE/OUTPT VISIT,PROCEDURE ONLY: CPT | Performed by: PODIATRIST

## 2022-05-11 PROCEDURE — 11721 DEBRIDE NAIL 6 OR MORE: CPT | Performed by: PODIATRIST

## 2022-05-18 NOTE — PROGRESS NOTES
[address] [facility] PODIATRY [facility]  [address] [address] [address]  Dept: [phone]  Dept Fax: [phone]    DIABETIC PROGRESS NOTE  Date of patient's visit: 5/18/2022  Patient's Name:  Angi Billingsley. YOB: 1950            Patient Care Team:  Melisa Heredia MD as PCP - General (Family Medicine)  Rose Roman MD as PCP - Cardiology (Internal Medicine)  Melisa Heredia MD as PCP - [facility]  Marcia Arrington MD as Referring Physician (Cardiology)  Silvestre Mahoney MD as Surgeon (Cardiothoracic Surgery)  Radha Dye DO (Podiatry)  Prisca Veliz DPM as Physician (Podiatry)          Chief Complaint   Patient presents with    Foot Pain         Subjective:   Angi Billingsley. comes to clinic for No chief complaint on file. he is a diabetic and states that he is doing well. Pt currently has complaint of thickened, elongated nails that they cannot manage by themselves. Pt's primary care physician is Melisa Heredia MD last seen 02/10/2022   Pt's last blood sugar was unknown. Lab Results   Component Value Date    LABA1C 7.0 02/10/2022      Complains of numbness in the feet bilat. Past Medical History:   Diagnosis Date    Acute coronary syndrome ([address]     CAD (coronary artery disease)     CABG X 2 2/24/2012    Chronic diastolic congestive heart failure ([address] 7/6/2016    Essential hypertension 12/9/2016    Hyperlipidemia 6/28/2012    Kidney stones     Metabolic syndrome     pre diabetes    Obesity 9/91/2729    Umbilical hernia        No Known Allergies  Current Outpatient Medications on File Prior to Visit   Medication Sig Dispense Refill    metFORMIN (GLUCOPHAGE) 1000 MG tablet TAKE ONE TABLET BY MOUTH TWICE A DAY WITH MEALS 60 tablet 5    dapagliflozin (FARXIGA) 10 MG tablet Take 1 tablet by mouth every morning 90 tablet 2    Misc.  Devices MISC 1 PAIR OF DIABETIC SHOES (1 LEFT/ 1 RIGHT)  1 -3 PAIRS OF INSERTS (LEFT/ RIGHT) 2 each 0    atorvastatin (LIPITOR) 40 MG tablet Take 40 mg by mouth daily      lisinopril (PRINIVIL;ZESTRIL) 5 MG tablet lisinopril 5 mg tablet   Take 1 tablet every day by oral route. 30 tablet 5    metoprolol tartrate (LOPRESSOR) 50 MG tablet Take 1 tablet by mouth 2 times daily (Patient taking differently: Take 25 mg by mouth 2 times daily 25mg 2 times a day) 60 tablet 3    aspirin (ASPIRIN LOW DOSE) 81 MG chewable tablet CHEW ONE TABLET BY MOUTH DAILY 30 tablet 3     No current facility-administered medications on file prior to visit. Review of Systems    Review of Systems:   History obtained from chart review and the patient  General ROS: negative for - chills, fatigue, fever, night sweats or weight gain  Constitutional: Negative for chills, diaphoresis, fatigue, fever and unexpected weight change. Musculoskeletal: Positive for arthralgias, gait problem and joint swelling. Neurological ROS: negative for - behavioral changes, confusion, headaches or seizures. Negative for weakness and numbness. Dermatological ROS: negative for - mole changes, rash  Cardiovascular: Negative for leg swelling. Gastrointestinal: Negative for constipation, diarrhea, nausea and vomiting. Objective:  Dermatologic Exam:  Skin lesion/ulceration Absent . Skin No rashes or nodules noted. .   Skin is thin, with flaky sloughing skin as well as decreased hair growth to the lower leg  Small red hemosiderin deposits seen dorsal foot   Musculoskeletal:     1st MPJ ROM decreased, Bilateral.  Muscle strength 5/5, Bilateral.  Pain present upon palpation of toenails 1-5, Bilateral. decreased medial longitudinal arch, Bilateral.  Ankle ROM decreased,Bilateral.    Dorsally contracted digits present digits 2, Bilateral.     Vascular: DP pulses 1/4 bilateral.  PT pulses 0/4 bilateral.   CFT <5 seconds, Bilateral.  Hair growth absent to the level of the digits, Bilateral.  Edema present, Bilateral. Varicosities absent, Bilateral. Erythema absent, Bilateral    Neurological: Sensation diminshed to light touch to level of digits, Bilateral.  Protective sensation intact 6/10 sites via 5.07/10g [place]-Juliette Monofilament, Bilateral.  negative Tinel's, Bilateral.  negative Valleix sign, Bilateral.      Integument: Warm, dry, supple, Bilateral.  Open lesion absent, Bilateral.  Interdigital maceration absent to web spaces 4, Bilateral.  Nails 1-5 left and 1-5 right thickened > 3.0 mm, dystrophic and crumbly, discolored with subungual debris. Fissures absent, Bilateral.   General: AAO x 3 in NAD.     Derm  Toenail Description  Sites of Onychomycosis Involvement (Check affected area)  [x] [x] [x] [x] [x] [x] [x] [x] [x] [x]  5 4 3 2 1 1 2 3 4 5                          Right                                        Left    Thickness  [x] [x] [x] [x] [x] [x] [x] [x] [x] [x]  5 4 3 2 1 1 2 3 4 5                         Right                                        Left    Dystrophic Changes   [x] [x] [x] [x] [x] [x] [x] [x] [x] [x]  5 4 3 2 1 1 2 3 4 5                         Right                                        Left    Color   [x] [x] [x] [x] [x] [x] [x] [x] [x] [x]  5 4 3 2 1 1 2 3 4 5                          Right                                        Left    Incurvation/Ingrowin   [] [] [] [] [] [] [] [] [] []  5 4 3 2 1 1 2 3 4 5                         Right                                        Left    Inflammation/Pain   [x] [x] [x] [x] [x] [x] [x] [x] [x] [x]  5 4 3 2 1 1 2 3 4 5                         Right                                        Left        Visual inspection:  Deformity: hammertoe deformity americo feet  amputation: absent  Skin lesions: absent  Edema: right- 2+ pitting edema, left- 2+ pitting edema    Sensory exam:  Monofilament sensation: abnormal - 6/10 via SW 5.07/10g monofilament to the plantar foot bilateral feet    Pulses: abnormal - 1/4 dorsalis pedis pulse and 0/4 Posterior tibial pulse,   Pinprick: Impaired  Proprioception: Impaired  Vibration (128 Hz): Impaired       DM with PVD       [x]Yes    []No      Assessment:  73 y.o. male with:   Diagnosis Orders   1. Type 2 diabetes mellitus without complication, without long-term current use of insulin ([facility])  15346 - [place] DEBRIDEMENT OF NAILS, 6 OR MORE    HM DIABETES FOOT EXAM   2. Dermatophytosis of nail  71545 - [place] DEBRIDEMENT OF NAILS, 6 OR MORE    HM DIABETES FOOT EXAM   3. Pain in both lower extremities  31635 - [place] DEBRIDEMENT OF NAILS, 6 OR MORE    HM DIABETES FOOT EXAM   4. Peripheral vascular disorder (HCC)  05872 - [place] DEBRIDEMENT OF NAILS, 6 OR MORE    HM DIABETES FOOT EXAM             Q7   []Yes    []No                Q8   [x]Yes    []No                     Q9   []Yes    []No    Plan:   Pt was evaluated and examined. Patient was given personalized discharge instructions. Nails 1-10 were debrided sharply in length and thickness with a nipper and , without incident. Pt will follow up in 9 weeks or sooner if any problems arise. Diagnosis was discussed with the pt and all of their questions were answered in detail. Proper foot hygiene and care was discussed with the pt. Informed patient on proper diabetic foot care and importance of tight glycemic control. Patient to check feet daily and contact the office with any questions/problems/concerns.    Other comorbidity noted and will be managed by PCP.  5/11/2022    Electronically signed by Vincent Lundberg DPM on 5/18/2022 at 4:11 PM  5/11/2022

## 2022-06-13 DIAGNOSIS — E11.9 TYPE 2 DIABETES MELLITUS WITHOUT COMPLICATION, WITHOUT LONG-TERM CURRENT USE OF INSULIN (HCC): ICD-10-CM

## 2022-06-13 NOTE — TELEPHONE ENCOUNTER
[facility] CLINICAL PHARMACY: STATIN THERAPY REVIEW  Identified statin use in persons with cardiovascular disease care gap per [employer]. Records dated 5/29/22. Last Office Visit: 2/10/22 Family Medicine    Patient also appears to be taking: metformin, Farxiga, lisinopril     Patient found in Outcomes MTM and is currently eligible for TIP    ASSESSMENT:  Patient has been identified as having a diagnosis for clinical ASCVD or event (e.g., inpatient hospitalization for MI, CABG, PCI or other revascularization procedures) in the measurement year and not currently filling a moderate or high intensity statin. Patients included in this care gap are males age 18-72 and females age 43-69. Per chart review, patient is prescribed atorvastatin 40 mg tablets    Per Insurance Records:   Atorvastatin 40 mg tablets last filled on 10/12/21 for a 90 day supply  Per [employer]  Atorvastatin 40 mg tablets last filled 10/13/21 for 90 day supply. Refills remaining. Refilling. Per [facility] Records:   Lisinopril 5 mg tablets last filled on 5/26/22 for a 90 day supply; Next refill due 8/24/22    Per Insurance Records:   Metformin 1000 mg tablets last filled on 4/27/22 for 30 day supply. Farxiga 10 mg tablet last filled on 4/27/22 for 30 day supply  Per chart review, refills remaining  Per [employer]  Metformin picked up 6/9/22    Lab Results   Component Value Date    CHOL 126 09/07/2021    TRIG 116 09/07/2021    HDL 39 (L) 09/07/2021    LDLCHOLESTEROL 64 09/07/2021     ALT   Date Value Ref Range Status   03/22/2018 20 5 - 41 U/L Final     Comment:     [facility] [address] [place] [address] [phone]     AST   Date Value Ref Range Status   06/05/2017 16 <40 U/L Final     Comment:     [facility] [address] [address] [phone]       The ASCVD Risk score (Margi Hammonds et al., 2013) failed to calculate for the following reasons:     The valid total cholesterol range is 130 to 320 mg/dL     PLAN:  The following are interventions that have been identified:   - Patient overdue refilling atorvastatin, and Farxiga and active on home medication list. Atorvastatin ready at pharmacy for , $0.  - Patient eligible for 90 day supply of metformin  - Patient eligible for TIP in Outcomes MTM    Attempting to reach patient to review.  Left message asking for return call.     Future Appointments   Date Time Provider Sera Yadav   7/13/2022  8:15 AM Diana Waite DPM [facility] Podiatry [facility] [place]   8/10/2022  8:00 AM Canelo Gomez MD [id number] [address], PharmD, [facility], [address]  Department, toll free: [phone], option 1

## 2022-06-13 NOTE — LETTER
[place]  [address], [address]      Felisa Carney. [address]       06/14/22     Dear Felisa Carney.,    We tried to reach you recently regarding your atorvastatin and Coletta Grounds, but were unable to reach you on the telephone. If you are no longer taking or taking differently than prescribed, please call us at the number below so that we can discuss this and update your medication profile. It appears that this medication has not been filled at proper times. We are worried you might be missing doses or not taking it as directed. It is important that you take your medications regularly and try not to miss a single dose. Some ways to help you remember to take and refill your medications are to:  · Use a pill box, set an alarm, and/or keep your medication near something that you do every day  · Fill a 3-month supply of your prescription at a time to save you time and trips to the pharmacy  if you would like assistance in switching your prescriptions to a 3-month supply, please contact us. Your metformin has been filled for 30 day supply and is eligible for a 90 day supply.   · Ask your pharmacy if they participate in [facility]", a program where you can  all of your medications on the same day  · Ask your pharmacy if you can be set up with automatic refill, where they will automatically refill your prescription when it is due and let you know it's ready to     Sincerely,   [address]  Department, toll free: [phone], option 1

## 2022-06-14 NOTE — TELEPHONE ENCOUNTER
Second attempt made to contact patient. Left voice message to return call to [phone], option 1. Letter sent.     Garrett Morin, PharmD, [address], [facility]  [facility] Pharmacist  Department, toll free: [phone], option 1

## 2022-06-20 NOTE — TELEPHONE ENCOUNTER
Patient returning call. Inquired if he's still taking the atorvastatin and [address] daily - states \"I think so\". Reviewed they both appear past due to refill and that [facility]. Patient is interested in 90-ds metformin.

## 2022-06-20 NOTE — TELEPHONE ENCOUNTER
Fallon Pathak MD, patient would like 90 day supply of his metformin. His insurance prefers this as well.  Rx pended for your signature/modification as appropriate    LOV: 2/10/22  Next: 8/10/22    Thank you,  Aaliyah Monge, PharmD, [facility], [address]   Department, toll free: [phone], option 1

## 2022-06-27 NOTE — TELEPHONE ENCOUNTER
Thank you, Dr. Whitley How. Note refill sent. Per [facility], metformin 90 day supply on file and old prescription inactivated. Also state that atorvastatin filled on 22 was also returned this same day, not picked up. Contacted patient to review. States he thinks he still takes atorvastatin, but his wife handles his medications for him. States he will discuss with her, informed him he has a refill on file at [facility].     Bethanie Corral, PharmD, [facility], [address]  [facility] Pharmacist  Department, toll free: [phone], option [address] in place:  No   Recommendation Provided To: Provider: 1 via Note to Provider, Patient/Caregiver: 2 via Telephone and Pharmacy: 1   Intervention Detail: Adherence Monitorin and Refill(s) Provided   Gap Closed?: Yes    Intervention Accepted By: Provider: 1, Patient/Caregiver: 1 and Pharmacy: 1   Time Spent (min): 25

## 2022-07-13 ENCOUNTER — OFFICE VISIT (OUTPATIENT)
Dept: PODIATRY | Age: 72
End: 2022-07-13
Payer: MEDICARE

## 2022-07-13 VITALS — WEIGHT: 248 LBS | HEIGHT: 70 IN | BODY MASS INDEX: 35.5 KG/M2

## 2022-07-13 DIAGNOSIS — M79.604 PAIN IN BOTH LOWER EXTREMITIES: ICD-10-CM

## 2022-07-13 DIAGNOSIS — M79.605 PAIN IN BOTH LOWER EXTREMITIES: ICD-10-CM

## 2022-07-13 DIAGNOSIS — E11.9 TYPE 2 DIABETES MELLITUS WITHOUT COMPLICATION, WITHOUT LONG-TERM CURRENT USE OF INSULIN (HCC): Primary | ICD-10-CM

## 2022-07-13 DIAGNOSIS — B35.1 DERMATOPHYTOSIS OF NAIL: ICD-10-CM

## 2022-07-13 DIAGNOSIS — I73.9 PERIPHERAL VASCULAR DISORDER (HCC): ICD-10-CM

## 2022-07-13 PROCEDURE — 11721 DEBRIDE NAIL 6 OR MORE: CPT | Performed by: PODIATRIST

## 2022-07-13 PROCEDURE — 3051F HG A1C>EQUAL 7.0%<8.0%: CPT | Performed by: PODIATRIST

## 2022-07-13 PROCEDURE — 99213 OFFICE O/P EST LOW 20 MIN: CPT | Performed by: PODIATRIST

## 2022-07-13 PROCEDURE — 1123F ACP DISCUSS/DSCN MKR DOCD: CPT | Performed by: PODIATRIST

## 2022-07-13 NOTE — PROGRESS NOTES
[address] [facility] PODIATRY [facility]  [id number] [place] [id number]  Dept: [phone]  Dept Fax: [phone]    DIABETIC PROGRESS NOTE  Date of patient's visit: 7/13/2022  Patient's Name:  Vanessa Rodríguez. YOB: 1950            Patient Care Team:  Hailey Bateman MD as PCP - General (Family Medicine)  Santiago Clancy MD as PCP - Cardiology (Internal Medicine)  Hailey Bateman MD as PCP - [facility]  Sara Cornell MD as Referring Physician (Cardiology)  Jae Wilson MD as Surgeon (Cardiothoracic Surgery)  Narayan Epps DO (Podiatry)  Inderjit Chester DPM as Physician (Podiatry)          Chief Complaint   Patient presents with    Diabetes     diabetic foot care    Peripheral Neuropathy     bilateral feet       Subjective:   Vanessa Barry comes to clinic for Diabetes (diabetic foot care) and Peripheral Neuropathy (bilateral feet)    he is a diabetic and states that he is doing well. Pt currently has complaint of thickened, elongated nails that they cannot manage by themselves. Pt's primary care physician is Hailey Bateman MD last seen 02/10/2022   Pt's last blood sugar was unknown. Lab Results   Component Value Date    LABA1C 7.0 02/10/2022      Complains of numbness in the feet bilat.   Past Medical History:   Diagnosis Date    Acute coronary syndrome (HCC)     CAD (coronary artery disease)     CABG X 2 2/24/2012    Chronic diastolic congestive heart failure ([facility] [address] 7/6/2016    Essential hypertension 12/9/2016    Hyperlipidemia 6/28/2012    Kidney stones     Metabolic syndrome     pre diabetes    Obesity 8/91/9395    Umbilical hernia        No Known Allergies  Current Outpatient Medications on File Prior to Visit   Medication Sig Dispense Refill    metFORMIN (GLUCOPHAGE) 1000 MG tablet TAKE ONE TABLET BY MOUTH TWICE A DAY WITH MEALS 180 tablet 1    dapagliflozin (FARXIGA) 10 MG tablet Take 1 tablet by mouth every morning 90 tablet 2    Misc. Devices MISC 1 PAIR OF DIABETIC SHOES (1 LEFT/ 1 RIGHT)  1 -3 PAIRS OF INSERTS (LEFT/ RIGHT) 2 each 0    atorvastatin (LIPITOR) 40 MG tablet Take 40 mg by mouth daily      lisinopril (PRINIVIL;ZESTRIL) 5 MG tablet lisinopril 5 mg tablet   Take 1 tablet every day by oral route. 30 tablet 5    metoprolol tartrate (LOPRESSOR) 50 MG tablet Take 1 tablet by mouth 2 times daily (Patient taking differently: Take 25 mg by mouth 2 times daily 25mg 2 times a day) 60 tablet 3    aspirin (ASPIRIN LOW DOSE) 81 MG chewable tablet CHEW ONE TABLET BY MOUTH DAILY 30 tablet 3     No current facility-administered medications on file prior to visit. Review of Systems    Review of Systems:   History obtained from chart review and the patient  General ROS: negative for - chills, fatigue, fever, night sweats or weight gain  Constitutional: Negative for chills, diaphoresis, fatigue, fever and unexpected weight change. Musculoskeletal: Positive for arthralgias, gait problem and joint swelling. Neurological ROS: negative for - behavioral changes, confusion, headaches or seizures. Negative for weakness and numbness. Dermatological ROS: negative for - mole changes, rash  Cardiovascular: Negative for leg swelling. Gastrointestinal: Negative for constipation, diarrhea, nausea and vomiting. Objective:  Dermatologic Exam:  Skin lesion/ulceration Absent . Skin No rashes or nodules noted. .   Skin is thin, with flaky sloughing skin as well as decreased hair growth to the lower leg  Small red hemosiderin deposits seen dorsal foot   Musculoskeletal:     1st MPJ ROM decreased, Bilateral.  Muscle strength 5/5, Bilateral.  Pain present upon palpation of toenails 1-5, Bilateral. decreased medial longitudinal arch, Bilateral.  Ankle ROM decreased,Bilateral.    Dorsally contracted digits present digits 2, Bilateral.     Vascular: DP pulses 1/4 bilateral.  PT pulses 0/4 bilateral.   CFT <5 seconds, Bilateral.  Hair growth absent to the level of the digits, Bilateral.  Edema present, Bilateral.  Varicosities absent, Bilateral. Erythema absent, Bilateral    Neurological: Sensation diminshed to light touch to level of digits, Bilateral.  Protective sensation intact 6/10 sites via 5.07/10g [place] Monofilament, Bilateral.  negative Tinel's, Bilateral.  negative Valleix sign, Bilateral.      Integument: Warm, dry, supple, Bilateral.  Open lesion absent, Bilateral.  Interdigital maceration absent to web spaces 4, Bilateral.  Nails 1-5 left and 1-5 right thickened > 3.0 mm, dystrophic and crumbly, discolored with subungual debris. Fissures absent, Bilateral.   General: AAO x 3 in NAD.     Derm  Toenail Description  Sites of Onychomycosis Involvement (Check affected area)  [x] [x] [x] [x] [x] [x] [x] [x] [x] [x]  5 4 3 2 1 1 2 3 4 5                          Right                                        Left    Thickness  [x] [x] [x] [x] [x] [x] [x] [x] [x] [x]  5 4 3 2 1 1 2 3 4 5                         Right                                        Left    Dystrophic Changes   [x] [x] [x] [x] [x] [x] [x] [x] [x] [x]  5 4 3 2 1 1 2 3 4 5                         Right                                        Left    Color   [x] [x] [x] [x] [x] [x] [x] [x] [x] [x]  5 4 3 2 1 1 2 3 4 5                          Right                                        Left    Incurvation/Ingrowin   [] [] [] [] [] [] [] [] [] []  5 4 3 2 1 1 2 3 4 5                         Right                                        Left    Inflammation/Pain   [x] [x] [x] [x] [x] [x] [x] [x] [x] [x]  5 4 3 2 1 1 2 3 4 5                         Right                                        Left        Visual inspection:  Deformity: hammertoe deformity americo feet  amputation: absent  Skin lesions: absent  Edema: right- 2+ pitting edema, left- 2+ pitting edema    Sensory exam:  Monofilament sensation: abnormal - 6/10 via SW 5.07/10g monofilament to the plantar foot bilateral feet    Pulses: abnormal - 1/4 dorsalis pedis pulse and 0/4 Posterior tibial pulse,   Pinprick: Impaired  Proprioception: Impaired  Vibration (128 Hz): Impaired       DM with PVD       [x]Yes    []No      Assessment:  73 y.o. male with:   Diagnosis Orders   1. Type 2 diabetes mellitus without complication, without long-term current use of insulin (HCC)   DIABETES FOOT EXAM    76426 - ME DEBRIDEMENT OF NAILS, 6 OR MORE      2. Dermatophytosis of nail  HM DIABETES FOOT EXAM    85387 - ME DEBRIDEMENT OF NAILS, 6 OR MORE      3. Pain in both lower extremities  HM DIABETES FOOT EXAM    40707 - ME DEBRIDEMENT OF NAILS, 6 OR MORE      4. Peripheral vascular disorder (HCC)   DIABETES FOOT EXAM    25675 - ME DEBRIDEMENT OF NAILS, 6 OR MORE              Q7   []Yes    []No                Q8   [x]Yes    []No                     Q9   []Yes    []No    Plan:   Pt was evaluated and examined. Patient was given personalized discharge instructions. Nails 1-10 were debrided sharply in length and thickness with a nipper and , without incident. Pt will follow up in 9 weeks or sooner if any problems arise. Diagnosis was discussed with the pt and all of their questions were answered in detail. Proper foot hygiene and care was discussed with the pt. Informed patient on proper diabetic foot care and importance of tight glycemic control. Patient to check feet daily and contact the office with any questions/problems/concerns.    Other comorbidity noted and will be managed by PCP.  7/13/2022    Electronically signed by Lisbeth Olivares DPM on 7/13/2022 at 8:01 AM  7/13/2022

## 2022-08-22 ENCOUNTER — TELEPHONE (OUTPATIENT)
Dept: PHARMACY | Facility: CLINIC | Age: 72
End: 2022-08-22

## 2022-08-22 NOTE — TELEPHONE ENCOUNTER
[facility] CLINICAL PHARMACY: STATIN THERAPY REVIEW  Identified statin use in persons with cardiovascular disease care gap per [employer]. Records dated 8/7/22. Last Office Visit: 2/10/22 PCP    Patient found in Outcomes MTM and is currently eligible for TIP    ASSESSMENT:  Patient has been identified as having a diagnosis for clinical ASCVD or event (e.g., inpatient hospitalization for MI, CABG, PCI or other revascularization procedures) in the measurement year and not currently filling a moderate or high intensity statin. Patients included in this care gap are males age 18-72 and females age 43-69. Per chart review, patient is prescribed atorvastatin 40 mg tablets    Per [employer]:   Atorvastatin 40 mg tablets last picked up on 10/13/22 for 90 day supply. 3 refills remaining until October. Billed through [facility] [place]   Component Value Date    CHOL 126 09/07/2021    TRIG 116 09/07/2021    HDL 39 (L) 09/07/2021    LDLCHOLESTEROL 64 09/07/2021     ALT   Date Value Ref Range Status   03/22/2018 20 5 - 41 U/L Final     Comment:     [address] [phone]     AST   Date Value Ref Range Status   06/05/2017 16 <40 U/L Final     Comment:     [address] [phone]       The ASCVD Risk score (Lonza Honey., et al., 2013) failed to calculate for the following reasons: The valid total cholesterol range is 130 to 320 mg/dL     ACE/ARB ADHERENCE    Insurance Records claims through 8/7/22 (2021 [facility] = n/a%; YTD PDC =  82%; Potential Fail Date: 9/27/22 ):   Lisinopril 5 mg tablets last filled on 5/26/22 for 90 day supply. Next refill due: 8/24/22    Per [employer]:  Lisinopril last filled 5/29/22 90 day supply. 1 refill remaining.      BP Readings from Last 3 Encounters:   02/10/22 114/70   11/10/21 126/82   09/21/21 122/70     CrCl cannot be calculated (Patient's most recent lab result is older than the maximum 180 days allowed. ). DIABETES ADHERENCE    Insurance Records claims through 8/7/22 (2021 [facility] = 89%; YTD [facility] =  92%; Potential Fail Date: 12/5/22 ):   Metformin 1000 mg tablets last filled on 7/15/22 for 90 day supply. Next refill due: 10/13/22    Per  [facility]:  Farxiga 10 mg last filled on 7/10/22 for 30 day supply. Per [address]:   [place] 10 mg tablet last picked up on 8/18/22 for 30 day supply. refills remaining. Billed through [facility]   Component Value Date    LABA1C 7.0 02/10/2022    LABA1C 9.1 11/10/2021    LABA1C 7.7 09/21/2021     NOTE A1c <9%    PLAN:  Patient overdue filling atorvastatin and due to fill lisinopril. Both medications are ready at pharmacy, $0.  TIPS available in Outcomes    Attempting to reach patient to review. Left message asking for return call.     Future Appointments   Date Time Provider Sera Yadav   8/23/2022  8:45 AM [facility]   9/21/2022  3:00 PM Jocelyn Russell DPM [facility] Cheng Spicer, PharmD, [facility], [address]  Department, toll free: [phone], option 1

## 2022-08-22 NOTE — LETTER
[place]  [address], [address]      Floresita Terry. [address]       08/23/22     Dear Floresita Terry.,    We tried to reach you recently regarding your atorvastatin, but were unable to reach you on the telephone. We have on file that you are currently taking atorvastatin 40 mg tablets- take 1 tablet by mouth daily. If you are no longer taking or taking differently, please call us at the number below so that we can discuss this and update your medication profile. It appears that this medication has not been filled at proper times. We are worried you might be missing doses or not taking it as directed. It is important that you take your medications regularly and try not to miss a single dose.     Some ways to help you remember to take and refill your medications are to:  · Use a pill box, set an alarm, and/or keep your medication near something that you do every day  · Fill a 3-month supply of your prescription at a time to save you time and trips to the pharmacy - if you would like assistance in switching your prescriptions to a 3-month supply, please contact us  · Ask your pharmacy if they participate in [facility]", a program where you can  all of your medications on the same day  · Ask your pharmacy if you can be set up with automatic refill, where they will automatically refill your prescription when it is due and let you know it's ready to     Sincerely,   [place] [address]  Department, toll free: [phone], option 1

## 2022-08-23 ENCOUNTER — OFFICE VISIT (OUTPATIENT)
Dept: INTERNAL MEDICINE CLINIC | Age: 72
End: 2022-08-23
Payer: MEDICARE

## 2022-08-23 VITALS
HEART RATE: 56 BPM | WEIGHT: 241 LBS | OXYGEN SATURATION: 96 % | TEMPERATURE: 97.4 F | RESPIRATION RATE: 16 BRPM | DIASTOLIC BLOOD PRESSURE: 60 MMHG | BODY MASS INDEX: 34.5 KG/M2 | SYSTOLIC BLOOD PRESSURE: 120 MMHG | HEIGHT: 70 IN

## 2022-08-23 DIAGNOSIS — Z76.89 ENCOUNTER TO ESTABLISH CARE WITH NEW DOCTOR: Primary | ICD-10-CM

## 2022-08-23 DIAGNOSIS — I50.32 CHRONIC DIASTOLIC CONGESTIVE HEART FAILURE (HCC): ICD-10-CM

## 2022-08-23 DIAGNOSIS — Z87.442 HISTORY OF KIDNEY STONES: ICD-10-CM

## 2022-08-23 DIAGNOSIS — H91.93 DECREASED HEARING OF BOTH EARS: ICD-10-CM

## 2022-08-23 DIAGNOSIS — N40.1 HYPERTROPHY OF PROSTATE WITH URINARY OBSTRUCTION: ICD-10-CM

## 2022-08-23 DIAGNOSIS — I25.10 CORONARY ARTERY DISEASE INVOLVING NATIVE CORONARY ARTERY OF NATIVE HEART WITHOUT ANGINA PECTORIS: ICD-10-CM

## 2022-08-23 DIAGNOSIS — N13.8 HYPERTROPHY OF PROSTATE WITH URINARY OBSTRUCTION: ICD-10-CM

## 2022-08-23 DIAGNOSIS — E78.5 DYSLIPIDEMIA: ICD-10-CM

## 2022-08-23 DIAGNOSIS — T82.858S BYPASS GRAFT STENOSIS, SEQUELA: ICD-10-CM

## 2022-08-23 DIAGNOSIS — I10 ESSENTIAL HYPERTENSION: ICD-10-CM

## 2022-08-23 DIAGNOSIS — E66.01 CLASS 2 SEVERE OBESITY DUE TO EXCESS CALORIES WITH SERIOUS COMORBIDITY IN ADULT, UNSPECIFIED BMI (HCC): ICD-10-CM

## 2022-08-23 DIAGNOSIS — E08.00 DIABETES MELLITUS DUE TO UNDERLYING CONDITION WITH HYPEROSMOLARITY WITHOUT COMA, WITHOUT LONG-TERM CURRENT USE OF INSULIN (HCC): ICD-10-CM

## 2022-08-23 PROBLEM — E11.9 TYPE 2 DIABETES MELLITUS WITHOUT COMPLICATION, WITHOUT LONG-TERM CURRENT USE OF INSULIN (HCC): Status: RESOLVED | Noted: 2020-10-06 | Resolved: 2022-08-23

## 2022-08-23 PROBLEM — L03.114 CELLULITIS OF LEFT UPPER EXTREMITY: Status: RESOLVED | Noted: 2020-12-07 | Resolved: 2022-08-23

## 2022-08-23 PROCEDURE — 99204 OFFICE O/P NEW MOD 45 MIN: CPT | Performed by: FAMILY MEDICINE

## 2022-08-23 PROCEDURE — 1123F ACP DISCUSS/DSCN MKR DOCD: CPT | Performed by: FAMILY MEDICINE

## 2022-08-23 ASSESSMENT — ENCOUNTER SYMPTOMS
GASTROINTESTINAL NEGATIVE: 1
RESPIRATORY NEGATIVE: 1
ALLERGIC/IMMUNOLOGIC NEGATIVE: 1
EYES NEGATIVE: 1

## 2022-08-23 NOTE — TELEPHONE ENCOUNTER
Second attempt made to contact patient. Left voice message to return call to [phone], option 1. Letter sent.

## 2022-08-23 NOTE — PROGRESS NOTES
Subjective:      Patient ID: Susan Dao. is a 73 y.o. male. Diabetes  He presents for his initial diabetic visit. He has type 2 diabetes mellitus. The initial diagnosis of diabetes was made 4 years ago. His disease course has been stable. There are no hypoglycemic associated symptoms. There are no diabetic associated symptoms. There are no hypoglycemic complications. Symptoms are stable. Diabetic complications include heart disease. Risk factors for coronary artery disease include diabetes mellitus, dyslipidemia, obesity, male sex and hypertension. Current diabetic treatment includes oral agent (monotherapy). He is compliant with treatment all of the time. His weight is stable. He is following a generally unhealthy, high fat/cholesterol, high salt and low fiber diet. Meal planning includes avoidance of concentrated sweets, calorie counting, ADA exchanges and carbohydrate counting. He participates in exercise three times a week. There is no change in his home blood glucose trend. Review of Systems   Constitutional: Negative. HENT: Negative. Eyes: Negative. Respiratory: Negative. Cardiovascular: Negative. Gastrointestinal: Negative. Endocrine: Negative. Musculoskeletal:  Positive for arthralgias. Skin: Negative. Allergic/Immunologic: Negative. Neurological: Negative. Hematological: Negative. Psychiatric/Behavioral: Negative. Past family and social history unremarkable. Diagnosis Orders   1. Encounter to establish care with new doctor        2. Coronary artery disease involving native coronary artery of native heart without angina pectoris  CBC    Comprehensive Metabolic Panel    Hemoglobin A1C    Lipid Panel    Microalbumin, Ur    Urinalysis with Microscopic    TSH    PSA Screening      3. Bypass graft stenosis, sequela        4. Dyslipidemia        5.  Class 2 severe obesity due to excess calories with serious comorbidity in adult, unspecified BMI ([facility] [address] Thought Content: Thought content normal.       Assessment:       Diagnosis Orders   1. Encounter to establish care with new doctor        2. Coronary artery disease involving native coronary artery of native heart without angina pectoris  CBC    Comprehensive Metabolic Panel    Hemoglobin A1C    Lipid Panel    Microalbumin, Ur    Urinalysis with Microscopic    TSH    PSA Screening      3. Bypass graft stenosis, sequela        4. Dyslipidemia        5. Class 2 severe obesity due to excess calories with serious comorbidity in adult, unspecified BMI ([address]        6. Chronic diastolic congestive heart failure (HCC)  CBC    Comprehensive Metabolic Panel    Hemoglobin A1C    Lipid Panel    Microalbumin, Ur    Urinalysis with Microscopic    TSH    PSA Screening      7. Essential hypertension        8. Diabetes mellitus , without long-term current use of insulin (HCC)  CBC    Comprehensive Metabolic Panel    Hemoglobin A1C    Lipid Panel    Microalbumin, Ur    Urinalysis with Microscopic    TSH    PSA Screening      9. Hypertrophy of prostate with urinary obstruction        10. History of kidney stones        11. Decreased hearing of both ears  NALDO Montes, Audiology, [place]              Plan:      45-year-old pleasant  male is presented to establish care. He does not voice any new distress, afebrile hemodynamically stable, clinical examination appears benign  Coronary artery disease status post CABG x2. No recent decompensation. He is established with cardiology. He is compliant with all his medications  Hypertension well controlled. Continue lisinopril, metoprolol, Farxiga. Consume less than 2 g of salt a day  Hyperlipidemia on statin that he is tolerating well  Diabetes mellitus well controlled with A1c of 7. He has been tolerating metformin well. Continue current regimen, ADA 1800 diet, daily moderate exercise  Monofilament testing is unremarkable.   He is counseled diabetic foot care  He is advised to update annual dilated eye exam  History of asymptomatic kidney stone. BPH. He denies any urinary retention or incontinence. He lives with his wife  Depression screen is negative  He denies tobacco, excessive alcohol or illicit drug use  Med list reviewed advised to continue  Further recommendations to follow updated labs  Call for any concern  Follow-up in 4 weeks  This note is created with a voice recognition program and while intend to generate a document that accurately reflects the content of the visit, no guarantee can be provided that every mistake has been identified and corrected by editing.           Gretchen Baumgarten, MD

## 2022-09-12 ENCOUNTER — HOSPITAL ENCOUNTER (OUTPATIENT)
Age: 72
Discharge: HOME | End: 2022-09-12
Payer: MEDICARE

## 2022-09-12 ENCOUNTER — HOSPITAL ENCOUNTER (OUTPATIENT)
Age: 72
Discharge: HOME | End: 2022-09-14
Payer: MEDICARE

## 2022-09-12 ENCOUNTER — HOSPITAL ENCOUNTER (OUTPATIENT)
Dept: GENERAL RADIOLOGY | Age: 72
Discharge: HOME | End: 2022-09-14
Payer: MEDICARE

## 2022-09-12 DIAGNOSIS — I50.32 CHRONIC DIASTOLIC CONGESTIVE HEART FAILURE (HCC): ICD-10-CM

## 2022-09-12 DIAGNOSIS — Z87.442 HISTORY OF KIDNEY STONES: ICD-10-CM

## 2022-09-12 DIAGNOSIS — E08.00 DIABETES MELLITUS DUE TO UNDERLYING CONDITION WITH HYPEROSMOLARITY WITHOUT COMA, WITHOUT LONG-TERM CURRENT USE OF INSULIN (HCC): ICD-10-CM

## 2022-09-12 DIAGNOSIS — I25.10 CORONARY ARTERY DISEASE INVOLVING NATIVE CORONARY ARTERY OF NATIVE HEART WITHOUT ANGINA PECTORIS: ICD-10-CM

## 2022-09-12 LAB
ALBUMIN SERPL-MCNC: 4.4 G/DL (ref 3.5–5.2)
ALBUMIN/GLOBULIN RATIO: 1.7 (ref 1–2.5)
ALP BLD-CCNC: 66 U/L (ref 40–129)
ALT SERPL-CCNC: 15 U/L (ref 5–41)
ANION GAP SERPL CALCULATED.3IONS-SCNC: 12 MMOL/L (ref 9–17)
AST SERPL-CCNC: 17 U/L
BILIRUB SERPL-MCNC: 1 MG/DL (ref 0.3–1.2)
BILIRUBIN URINE: NEGATIVE
BUN BLDV-MCNC: 13 MG/DL (ref 8–23)
CALCIUM SERPL-MCNC: 8.8 MG/DL (ref 8.6–10.4)
CASTS UA: ABNORMAL /LPF (ref 0–8)
CHLORIDE BLD-SCNC: 101 MMOL/L (ref 98–107)
CHOLESTEROL/HDL RATIO: 2.7
CHOLESTEROL: 128 MG/DL
CO2: 25 MMOL/L (ref 20–31)
COLOR: YELLOW
CREAT SERPL-MCNC: 0.81 MG/DL (ref 0.7–1.2)
EPITHELIAL CELLS UA: ABNORMAL /HPF (ref 0–5)
ESTIMATED AVERAGE GLUCOSE: 151 MG/DL
GFR AFRICAN AMERICAN: >60 ML/MIN
GFR NON-AFRICAN AMERICAN: >60 ML/MIN
GFR SERPL CREATININE-BSD FRML MDRD: ABNORMAL ML/MIN/{1.73_M2}
GLUCOSE BLD-MCNC: 115 MG/DL (ref 70–99)
GLUCOSE URINE: ABNORMAL
HBA1C MFR BLD: 6.9 % (ref 4–6)
HCT VFR BLD CALC: 48.8 % (ref 40.7–50.3)
HDLC SERPL-MCNC: 47 MG/DL
HEMOGLOBIN: 15.5 G/DL (ref 13–17)
KETONES, URINE: NEGATIVE
LDL CHOLESTEROL: 57 MG/DL (ref 0–130)
LEUKOCYTE ESTERASE, URINE: NEGATIVE
MCH RBC QN AUTO: 28.2 PG (ref 25.2–33.5)
MCHC RBC AUTO-ENTMCNC: 31.8 G/DL (ref 28.4–34.8)
MCV RBC AUTO: 88.7 FL (ref 82.6–102.9)
NITRITE, URINE: NEGATIVE
NRBC AUTOMATED: 0 PER 100 WBC
PDW BLD-RTO: 13.7 % (ref 11.8–14.4)
PH UA: 5 (ref 5–8)
PLATELET # BLD: 246 K/UL (ref 138–453)
PMV BLD AUTO: 10.8 FL (ref 8.1–13.5)
POTASSIUM SERPL-SCNC: 4.1 MMOL/L (ref 3.7–5.3)
PROSTATE SPECIFIC ANTIGEN: 0.66 NG/ML
PROSTATE SPECIFIC ANTIGEN: 0.66 NG/ML
PROTEIN UA: NEGATIVE
RBC # BLD: 5.5 M/UL (ref 4.21–5.77)
RBC UA: ABNORMAL /HPF (ref 0–4)
SODIUM BLD-SCNC: 138 MMOL/L (ref 135–144)
SPECIFIC GRAVITY UA: 1.04 (ref 1–1.03)
TOTAL PROTEIN: 7 G/DL (ref 6.4–8.3)
TRIGL SERPL-MCNC: 121 MG/DL
TSH SERPL DL<=0.05 MIU/L-ACNC: 2.32 UIU/ML (ref 0.3–5)
TURBIDITY: CLEAR
URINE HGB: NEGATIVE
UROBILINOGEN, URINE: NORMAL
WBC # BLD: 9.3 K/UL (ref 3.5–11.3)
WBC UA: ABNORMAL /HPF (ref 0–5)

## 2022-09-12 PROCEDURE — 36415 COLL VENOUS BLD VENIPUNCTURE: CPT

## 2022-09-12 PROCEDURE — 74018 RADEX ABDOMEN 1 VIEW: CPT

## 2022-09-12 PROCEDURE — 80053 COMPREHEN METABOLIC PANEL: CPT

## 2022-09-12 PROCEDURE — 80061 LIPID PANEL: CPT

## 2022-09-12 PROCEDURE — 82570 ASSAY OF URINE CREATININE: CPT

## 2022-09-12 PROCEDURE — 85027 COMPLETE CBC AUTOMATED: CPT

## 2022-09-12 PROCEDURE — 82043 UR ALBUMIN QUANTITATIVE: CPT

## 2022-09-12 PROCEDURE — 83036 HEMOGLOBIN GLYCOSYLATED A1C: CPT

## 2022-09-12 PROCEDURE — 84153 ASSAY OF PSA TOTAL: CPT

## 2022-09-12 PROCEDURE — 84443 ASSAY THYROID STIM HORMONE: CPT

## 2022-09-12 PROCEDURE — G0103 PSA SCREENING: HCPCS

## 2022-09-12 PROCEDURE — 81001 URINALYSIS AUTO W/SCOPE: CPT

## 2022-09-14 LAB
CREATININE URINE: 110.2 MG/DL (ref 39–259)
MICROALBUMIN/CREAT 24H UR: 41 MG/L
MICROALBUMIN/CREAT UR-RTO: 37 MCG/MG CREAT

## 2022-09-15 ENCOUNTER — TELEPHONE (OUTPATIENT)
Dept: PHARMACY | Facility: CLINIC | Age: 72
End: 2022-09-15

## 2022-09-15 NOTE — TELEPHONE ENCOUNTER
Only    CPA in place:  No  Time Spent (min): 15      Future Appointments   Date Time Provider Sera Yadav   9/20/2022  8:15 AM Tres Weber MD [facility]   9/21/2022  3:00 PM Kylah Jacome DPM [facility]   9/29/2022  8:30 AM Sarahy Dias MD [facility].   [address] [facility] free: [phone]

## 2022-09-20 ENCOUNTER — OFFICE VISIT (OUTPATIENT)
Dept: INTERNAL MEDICINE CLINIC | Age: 72
End: 2022-09-20
Payer: MEDICARE

## 2022-09-20 VITALS
HEART RATE: 55 BPM | OXYGEN SATURATION: 97 % | DIASTOLIC BLOOD PRESSURE: 72 MMHG | SYSTOLIC BLOOD PRESSURE: 118 MMHG | HEIGHT: 70 IN | WEIGHT: 242 LBS | BODY MASS INDEX: 34.65 KG/M2 | RESPIRATION RATE: 16 BRPM | TEMPERATURE: 97.5 F

## 2022-09-20 DIAGNOSIS — Z87.442 HISTORY OF KIDNEY STONES: ICD-10-CM

## 2022-09-20 DIAGNOSIS — N13.8 HYPERTROPHY OF PROSTATE WITH URINARY OBSTRUCTION: ICD-10-CM

## 2022-09-20 DIAGNOSIS — Z23 NEED FOR INFLUENZA VACCINATION: ICD-10-CM

## 2022-09-20 DIAGNOSIS — I25.10 CORONARY ARTERY DISEASE INVOLVING NATIVE CORONARY ARTERY OF NATIVE HEART WITHOUT ANGINA PECTORIS: Primary | ICD-10-CM

## 2022-09-20 DIAGNOSIS — E08.00 DIABETES MELLITUS DUE TO UNDERLYING CONDITION WITH HYPEROSMOLARITY WITHOUT COMA, WITHOUT LONG-TERM CURRENT USE OF INSULIN (HCC): ICD-10-CM

## 2022-09-20 DIAGNOSIS — I10 ESSENTIAL HYPERTENSION: ICD-10-CM

## 2022-09-20 DIAGNOSIS — E78.5 DYSLIPIDEMIA: ICD-10-CM

## 2022-09-20 DIAGNOSIS — I50.32 CHRONIC DIASTOLIC CONGESTIVE HEART FAILURE (HCC): ICD-10-CM

## 2022-09-20 DIAGNOSIS — E66.01 CLASS 2 SEVERE OBESITY DUE TO EXCESS CALORIES WITH SERIOUS COMORBIDITY IN ADULT, UNSPECIFIED BMI (HCC): ICD-10-CM

## 2022-09-20 DIAGNOSIS — H91.93 DECREASED HEARING OF BOTH EARS: ICD-10-CM

## 2022-09-20 DIAGNOSIS — N40.1 HYPERTROPHY OF PROSTATE WITH URINARY OBSTRUCTION: ICD-10-CM

## 2022-09-20 PROCEDURE — G0008 ADMIN INFLUENZA VIRUS VAC: HCPCS | Performed by: FAMILY MEDICINE

## 2022-09-20 PROCEDURE — 1123F ACP DISCUSS/DSCN MKR DOCD: CPT | Performed by: FAMILY MEDICINE

## 2022-09-20 PROCEDURE — 99214 OFFICE O/P EST MOD 30 MIN: CPT | Performed by: FAMILY MEDICINE

## 2022-09-20 PROCEDURE — 90694 VACC AIIV4 NO PRSRV 0.5ML IM: CPT | Performed by: FAMILY MEDICINE

## 2022-09-20 ASSESSMENT — ENCOUNTER SYMPTOMS
ALLERGIC/IMMUNOLOGIC NEGATIVE: 1
GASTROINTESTINAL NEGATIVE: 1
EYES NEGATIVE: 1
RESPIRATORY NEGATIVE: 1

## 2022-09-20 NOTE — PROGRESS NOTES
Subjective:      Patient ID: Zakiya Null is a 73 y.o. male. Diabetes  He presents for his follow-up diabetic visit. He has type 2 diabetes mellitus. The initial diagnosis of diabetes was made 5 years ago. His disease course has been improving. There are no hypoglycemic associated symptoms. There are no diabetic associated symptoms. There are no hypoglycemic complications. Symptoms are stable. Diabetic complications include heart disease. Risk factors for coronary artery disease include diabetes mellitus, dyslipidemia, obesity, male sex and hypertension. Current diabetic treatment includes oral agent (monotherapy). He is compliant with treatment all of the time. His weight is stable. He is following a generally healthy diet. Meal planning includes ADA exchanges, avoidance of concentrated sweets, calorie counting and carbohydrate counting. He has had a previous visit with a dietitian. He participates in exercise three times a week. His home blood glucose trend is decreasing steadily. An ACE inhibitor/angiotensin II receptor blocker is being taken. Review of Systems   Constitutional: Negative. HENT: Negative. Eyes: Negative. Respiratory: Negative. Cardiovascular: Negative. Gastrointestinal: Negative. Endocrine: Negative. Musculoskeletal: Negative. Skin: Negative. Allergic/Immunologic: Negative. Neurological: Negative. Hematological: Negative. Psychiatric/Behavioral: Negative. Past family and social history unremarkable. Diagnosis Orders   1. Coronary artery disease involving native coronary artery of native heart without angina pectoris        2. Dyslipidemia        3. Class 2 severe obesity due to excess calories with serious comorbidity in adult, unspecified BMI ([address]        4. Chronic diastolic congestive heart failure ([address]        5. Essential hypertension        6. Diabetes mellitus , without long-term current use of insulin ([address]        7.  Hypertrophy of prostate with urinary obstruction        8. History of kidney stones        9. Decreased hearing of both ears  MILLIE - NALDO Grubbs, Audiology, [facility]      10. Need for influenza vaccination  Influenza, FLUAD, (age 72 y+), IM, PF, 0.5 mL          Objective:   Physical Exam  Vitals and nursing note reviewed. Constitutional:       Appearance: He is well-developed. HENT:      Head: Normocephalic and atraumatic. Right Ear: External ear normal.      Left Ear: External ear normal.      Ears:      Comments: Decreased hearing     Nose: Nose normal.   Eyes:      Conjunctiva/sclera: Conjunctivae normal.      Pupils: Pupils are equal, round, and reactive to light. Cardiovascular:      Rate and Rhythm: Normal rate and regular rhythm. Heart sounds: Normal heart sounds. Comments: Coronary artery disease  Diastolic congestive heart failure  Hypertension  Hyperlipidemia  Pulmonary:      Effort: Pulmonary effort is normal.      Breath sounds: Normal breath sounds. Abdominal:      General: Bowel sounds are normal.      Palpations: Abdomen is soft. Genitourinary:     Comments: Asymptomatic kidney stones  Musculoskeletal:         General: Normal range of motion. Cervical back: Normal range of motion and neck supple. Skin:     General: Skin is warm and dry. Neurological:      Mental Status: He is alert and oriented to person, place, and time. Deep Tendon Reflexes: Reflexes are normal and symmetric. Psychiatric:         Behavior: Behavior normal.         Thought Content: Thought content normal.       Assessment:       Diagnosis Orders   1. Coronary artery disease involving native coronary artery of native heart without angina pectoris        2. Dyslipidemia        3. Class 2 severe obesity due to excess calories with serious comorbidity in adult, unspecified BMI ([address]        4. Chronic diastolic congestive heart failure ([address]        5. Essential hypertension        6.  Diabetes mellitus , without long-term current use of insulin ([facility] [address]        7. Hypertrophy of prostate with urinary obstruction        8. History of kidney stones        9. Decreased hearing of both ears  MILLIE - NALDO Jones, Audiology, [facility]      10. Need for influenza vaccination  Influenza, FLUAD, (age 72 y+), IM, PF, 0.5 mL              Plan:      65-year-old male returns for follow-up. He does not voice any distress, afebrile hemodynamically stable, clinical examination is benign  History of coronary artery disease. He is asymptomatic. He is established with cardiology. No recent decompensation. History of diastolic congestive heart failure. I do not have access to his recent echocardiogram report. Diabetes mellitus on metformin. A1c 6.9. He is advised to continue current regimen, ADA 1800 diet, daily moderate exercise  Hypertension well-controlled with random blood pressure equal to less than 130/80. Continue metoprolol, lisinopril that he is tolerating well. Consume less than 2 g of salt a day  Monofilament testing is unremarkable. He is counseled on diabetic foot care  He is advised to update annual dilated eye exam  Review of the record shows that he has not been taking [place] since February 2022. We will check with pharmacy and his wife. He denies tobacco, excessive alcohol or illicit drug use  He is up influenza, pneumococcal vaccine, Tdap  Med list and available labs reviewed, discussed with patient, questions answered  He is encouraged to call for any concern  This note is created with a voice recognition program and while intend to generate a document that accurately reflects the content of the visit, no guarantee can be provided that every mistake has been identified and corrected by editing.           Trang Weller MD

## 2022-09-21 ENCOUNTER — OFFICE VISIT (OUTPATIENT)
Dept: PODIATRY | Age: 72
End: 2022-09-21
Payer: MEDICARE

## 2022-09-21 VITALS — HEIGHT: 70 IN | WEIGHT: 242 LBS | BODY MASS INDEX: 34.65 KG/M2

## 2022-09-21 DIAGNOSIS — B35.1 DERMATOPHYTOSIS OF NAIL: ICD-10-CM

## 2022-09-21 DIAGNOSIS — E11.9 TYPE 2 DIABETES MELLITUS WITHOUT COMPLICATION, WITHOUT LONG-TERM CURRENT USE OF INSULIN (HCC): Primary | ICD-10-CM

## 2022-09-21 DIAGNOSIS — M79.604 PAIN IN BOTH LOWER EXTREMITIES: ICD-10-CM

## 2022-09-21 DIAGNOSIS — M79.605 PAIN IN BOTH LOWER EXTREMITIES: ICD-10-CM

## 2022-09-21 PROCEDURE — 99999 PR OFFICE/OUTPT VISIT,PROCEDURE ONLY: CPT | Performed by: PODIATRIST

## 2022-09-21 PROCEDURE — 11721 DEBRIDE NAIL 6 OR MORE: CPT | Performed by: PODIATRIST

## 2022-09-21 NOTE — PROGRESS NOTES
[address] [facility] PODIATRY [facility]  [address] [address] [place] [id number]  Dept: [phone]  Dept Fax: [phone]    DIABETIC PROGRESS NOTE  Date of patient's visit: 9/21/2022  Patient's Name:  Vanessa Barry YOB: 1950            Patient Care Team:  Riana Reinoso MD as PCP - General (Family Medicine)  Santiago Clancy MD as PCP - Cardiology (Internal Medicine)  Riana Reinoso MD as PCP - REHABILITATION [facility] Provider  Narayan Epps DO (Podiatry)  Inderjit Chester DPM as Physician (Podiatry)  Randell Bautista MD as Consulting Physician (Urology)  Jatin Moran (Audiology)          Chief Complaint   Patient presents with    Diabetes     Diabetic foot care    Peripheral Neuropathy     Bilateral feet       Subjective:   Vanessa Barry comes to clinic for Diabetes (Diabetic foot care) and Peripheral Neuropathy (Bilateral feet)    he is a diabetic and states that he is doing well. Pt currently has complaint of thickened, elongated nails that they cannot manage by themselves. Pt's primary care physician is Riana Reinoso MD last seen 09/20/2022   Pt's last blood sugar was unknown. Lab Results   Component Value Date    LABA1C 6.9 (H) 09/12/2022      Complains of numbness in the feet bilat.   Past Medical History:   Diagnosis Date    Acute coronary syndrome (HCC)     CAD (coronary artery disease)     CABG X 2 2/24/2012    Chronic diastolic congestive heart failure ([address] 7/6/2016    Essential hypertension 12/9/2016    Hyperlipidemia 6/28/2012    Kidney stones     Metabolic syndrome     pre diabetes    Obesity 4/93/0098    Umbilical hernia        No Known Allergies  Current Outpatient Medications on File Prior to Visit   Medication Sig Dispense Refill    metFORMIN (GLUCOPHAGE) 1000 MG tablet TAKE ONE TABLET BY MOUTH TWICE A DAY WITH MEALS 180 tablet 1    atorvastatin (LIPITOR) 40 MG tablet Take 40 mg by mouth daily      lisinopril (PRINIVIL;ZESTRIL) 5 MG tablet lisinopril 5 mg tablet   Take 1 tablet every day by oral route. 30 tablet 5    metoprolol tartrate (LOPRESSOR) 50 MG tablet Take 1 tablet by mouth 2 times daily 60 tablet 3    aspirin (ASPIRIN LOW DOSE) 81 MG chewable tablet CHEW ONE TABLET BY MOUTH DAILY 30 tablet 3    dapagliflozin (FARXIGA) 10 MG tablet Take 1 tablet by mouth every morning (Patient not taking: No sig reported) 90 tablet 2     No current facility-administered medications on file prior to visit. Review of Systems    Review of Systems:   History obtained from chart review and the patient  General ROS: negative for - chills, fatigue, fever, night sweats or weight gain  Constitutional: Negative for chills, diaphoresis, fatigue, fever and unexpected weight change. Musculoskeletal: Positive for arthralgias, gait problem and joint swelling. Neurological ROS: negative for - behavioral changes, confusion, headaches or seizures. Negative for weakness and numbness. Dermatological ROS: negative for - mole changes, rash  Cardiovascular: Negative for leg swelling. Gastrointestinal: Negative for constipation, diarrhea, nausea and vomiting. Objective:  Dermatologic Exam:  Skin lesion/ulceration Absent . Skin No rashes or nodules noted. .   Skin is thin, with flaky sloughing skin as well as decreased hair growth to the lower leg  Small red hemosiderin deposits seen dorsal foot   Musculoskeletal:     1st MPJ ROM decreased, Bilateral.  Muscle strength 5/5, Bilateral.  Pain present upon palpation of toenails 1-5, Bilateral. decreased medial longitudinal arch, Bilateral.  Ankle ROM decreased,Bilateral.    Dorsally contracted digits present digits 2, Bilateral.     Vascular: DP pulses 1/4 bilateral.  PT pulses 0/4 bilateral.   CFT <5 seconds, Bilateral.  Hair growth absent to the level of the digits, Bilateral.  Edema present, Bilateral.  Varicosities absent, Bilateral. Erythema absent, Bilateral    Neurological: Sensation diminshed to light touch to level of digits, Bilateral.  Protective sensation intact 6/10 sites via 5.07/10g [place] Monofilament, Bilateral.  negative Tinel's, Bilateral.  negative Valleix sign, Bilateral.      Integument: Warm, dry, supple, Bilateral.  Open lesion absent, Bilateral.  Interdigital maceration absent to web spaces 4, Bilateral.  Nails 1-5 left and 1-5 right thickened > 3.0 mm, dystrophic and crumbly, discolored with subungual debris. Fissures absent, Bilateral.   General: AAO x 3 in NAD.     Derm  Toenail Description  Sites of Onychomycosis Involvement (Check affected area)  [x] [x] [x] [x] [x] [x] [x] [x] [x] [x]  5 4 3 2 1 1 2 3 4 5                          Right                                        Left    Thickness  [x] [x] [x] [x] [x] [x] [x] [x] [x] [x]  5 4 3 2 1 1 2 3 4 5                         Right                                        Left    Dystrophic Changes   [x] [x] [x] [x] [x] [x] [x] [x] [x] [x]  5 4 3 2 1 1 2 3 4 5                         Right                                        Left    Color   [x] [x] [x] [x] [x] [x] [x] [x] [x] [x]  5 4 3 2 1 1 2 3 4 5                          Right                                        Left    Incurvation/Ingrowin   [] [] [] [] [] [] [] [] [] []  5 4 3 2 1 1 2 3 4 5                         Right                                        Left    Inflammation/Pain   [x] [x] [x] [x] [x] [x] [x] [x] [x] [x]  5 4 3 2 1 1 2 3 4 5                         Right                                        Left        Visual inspection:  Deformity: hammertoe deformity americo feet  amputation: absent  Skin lesions: absent  Edema: right- 2+ pitting edema, left- 2+ pitting edema    Sensory exam:  Monofilament sensation: abnormal - 6/10 via SW 5.07/10g monofilament to the plantar foot bilateral feet    Pulses: abnormal - 1/4 dorsalis pedis pulse and 0/4 Posterior tibial pulse,   Pinprick: Impaired  Proprioception: Impaired  Vibration (128 Hz): Impaired       DM with PVD       [x]Yes    []No      Assessment:  73 y.o. male with:   Diagnosis Orders   1. Type 2 diabetes mellitus without complication, without long-term current use of insulin (HCC)   DIABETES FOOT EXAM    85932 - TX DEBRIDEMENT OF NAILS, 6 OR MORE      2. Dermatophytosis of nail   DIABETES FOOT EXAM    83364 - TX DEBRIDEMENT OF NAILS, 6 OR MORE      3. Pain in both lower extremities  HM DIABETES FOOT EXAM    68143 - TX DEBRIDEMENT OF NAILS, 6 OR MORE                Q7   []Yes    []No                Q8   [x]Yes    []No                     Q9   []Yes    []No    Plan:   Pt was evaluated and examined. Patient was given personalized discharge instructions. Nails 1-10 were debrided sharply in length and thickness with a nipper and , without incident. Pt will follow up in 9 weeks or sooner if any problems arise. Diagnosis was discussed with the pt and all of their questions were answered in detail. Proper foot hygiene and care was discussed with the pt. Informed patient on proper diabetic foot care and importance of tight glycemic control. Patient to check feet daily and contact the office with any questions/problems/concerns.    Other comorbidity noted and will be managed by PCP.  9/21/2022    Electronically signed by Maurilio Santacruz DPM on 9/21/2022 at 2:59 PM  9/21/2022

## 2022-09-29 PROBLEM — R31.29 MICROHEMATURIA: Status: ACTIVE | Noted: 2022-09-29

## 2022-09-29 PROBLEM — N20.0 RIGHT KIDNEY STONE: Status: ACTIVE | Noted: 2022-09-29

## 2022-12-07 ENCOUNTER — TELEPHONE (OUTPATIENT)
Dept: PHARMACY | Facility: CLINIC | Age: 72
End: 2022-12-07

## 2022-12-07 NOTE — TELEPHONE ENCOUNTER
[facility] CLINICAL PHARMACY: ADHERENCE REVIEW  Identified care gap per [employer]: fills at [facility]: ACE/ARB, Diabetes, and Statin adherence    Last Visit: 22    Patient found in [facility] and is currently eligible for TIP    ASSESSMENT  ACE/ARB ADHERENCE    Insurance Records claims through 22 (Prior Year Kunal Gil =  n/a%; YTD [facility] =  88%; Potential Fail Date: 22 ):   Lisinopril 5 mg tablets last filled on 22 for 90 day supply. Next refill due: 22    Per [address]:   Lisinopril last picked up on 22 for 90 day supply. No refills. Billed through [facility]. Electronic refill request sent    BP Readings from Last 3 Encounters:   22 118/72   22 120/60   02/10/22 114/70     Estimated Creatinine Clearance: 105 mL/min (based on SCr of 0.81 mg/dL). DIABETES ADHERENCE    Insurance Records claims through 22 (Prior Year Kunal Gil =  89%; YTD [facility] =  94%; Passed in ): Metformin 1000 mg tablets last filled on 10/15/22 for 90 day supply. Next refill due: 22    Per [address]:   Oral Anes last picked up on 22 for 30 day supply. 2 refills remaining. Billed through [facility]   Component Value Date    LABA1C 6.9 (H) 2022    LABA1C 7.0 02/10/2022    LABA1C 9.1 11/10/2021     NOTE A1c <9%    STATIN ADHERENCE    Insurance Records claims through 22 (Prior Year PDC =  n/a%; YTD PDC = Filled only once; Potential Fail Date: 22 ):   Atorvastatin 40 mg tablets last filled on 22 for 90 day supply. Next refill due: 22    Per [address]:   Atorvastatin last picked up on 22 for 90 day supply.  refills. Billed through [facility].  Electronic refill request sent    Lab Results   Component Value Date    CHOL 128 2022    TRIG 121 2022    HDL 47 2022    LDLCHOLESTEROL 57 2022     ALT   Date Value Ref Range Status   2022 15 5 - 41 U/L Final     AST   Date Value Ref Range Status   2022 17 <40 U/L Final     The ASCVD Risk score (Allyn WALTER, et al., 2019) failed to calculate for the following reasons: The valid total cholesterol range is 130 to 320 mg/dL     PLAN  The following are interventions that have been identified:   - Patient overdue refilling atorvastatin, lisinopril and Farxiga and active on home medication list.   - Patient needs refills for atorvastatin and lisinopril- pharmacy sent electronic request    Attempt to reach patient to review.  Unable to leave message    Future Appointments   Date Time Provider Sera Yadav   1/19/2023  8:15 AM MD Shayy Singh, PharmD, [facility], [address]  Department, toll free: [phone], option 1

## 2022-12-07 NOTE — LETTER
[place]  [address], [address]        Isaura Silva. [address]         12/13/22     Dear Isaura Silva.,    We tried to reach you recently regarding your lisinopril 5 mg tablets and atorvastatin 40 mg tablets, but were unable to reach you on the telephone. If you are no longer taking or taking differently than prescribed, please call us at the number below so that we can discuss this and update your medication profile. It appears that this medication has not been filled at proper times. We are worried you might be missing doses or not taking it as directed. It is important that you take your medications regularly and try not to miss a single dose.     Some ways to help you remember to take and refill your medications are to:  · Use a pill box, set an alarm, and/or keep your medication near something that you do every day  · Fill a 3-month supply of your prescription at a time to save you time and trips to the pharmacy - if you would like assistance in switching your prescriptions to a 3-month supply, please contact us  · Ask your pharmacy if they participate in [facility]", a program where you can  all of your medications on the same day  · Ask your pharmacy if you can be set up with automatic refill, where they will automatically refill your prescription when it is due and let you know it's ready to     Sincerely,   [address]  Department, toll free: [phone], option 1

## 2022-12-13 ENCOUNTER — OFFICE VISIT (OUTPATIENT)
Dept: INTERNAL MEDICINE CLINIC | Age: 72
End: 2022-12-13

## 2022-12-13 VITALS
HEIGHT: 70 IN | DIASTOLIC BLOOD PRESSURE: 72 MMHG | TEMPERATURE: 98.8 F | SYSTOLIC BLOOD PRESSURE: 138 MMHG | HEART RATE: 58 BPM | RESPIRATION RATE: 16 BRPM | BODY MASS INDEX: 34.96 KG/M2 | OXYGEN SATURATION: 97 % | WEIGHT: 244.2 LBS

## 2022-12-13 DIAGNOSIS — I10 ESSENTIAL HYPERTENSION: ICD-10-CM

## 2022-12-13 DIAGNOSIS — E78.5 DYSLIPIDEMIA: ICD-10-CM

## 2022-12-13 DIAGNOSIS — N20.0 RIGHT KIDNEY STONE: ICD-10-CM

## 2022-12-13 DIAGNOSIS — J06.9 URI, ACUTE: ICD-10-CM

## 2022-12-13 DIAGNOSIS — N13.8 BPH WITH OBSTRUCTION/LOWER URINARY TRACT SYMPTOMS: ICD-10-CM

## 2022-12-13 DIAGNOSIS — I25.10 CORONARY ARTERY DISEASE INVOLVING NATIVE CORONARY ARTERY OF NATIVE HEART WITHOUT ANGINA PECTORIS: ICD-10-CM

## 2022-12-13 DIAGNOSIS — E08.00 DIABETES MELLITUS DUE TO UNDERLYING CONDITION WITH HYPEROSMOLARITY WITHOUT COMA, WITHOUT LONG-TERM CURRENT USE OF INSULIN (HCC): Primary | ICD-10-CM

## 2022-12-13 DIAGNOSIS — H91.93 DECREASED HEARING OF BOTH EARS: ICD-10-CM

## 2022-12-13 DIAGNOSIS — E66.01 CLASS 2 SEVERE OBESITY DUE TO EXCESS CALORIES WITH SERIOUS COMORBIDITY IN ADULT, UNSPECIFIED BMI (HCC): ICD-10-CM

## 2022-12-13 DIAGNOSIS — N40.1 BPH WITH OBSTRUCTION/LOWER URINARY TRACT SYMPTOMS: ICD-10-CM

## 2022-12-13 DIAGNOSIS — I50.32 CHRONIC DIASTOLIC CONGESTIVE HEART FAILURE (HCC): ICD-10-CM

## 2022-12-13 PROBLEM — Z87.442 HISTORY OF KIDNEY STONES: Status: RESOLVED | Noted: 2019-09-23 | Resolved: 2022-12-13

## 2022-12-13 PROBLEM — R31.29 MICROHEMATURIA: Status: RESOLVED | Noted: 2022-09-29 | Resolved: 2022-12-13

## 2022-12-13 RX ORDER — PREDNISONE 10 MG/1
10 TABLET ORAL DAILY
Qty: 6 TABLET | Refills: 0 | Status: SHIPPED | OUTPATIENT
Start: 2022-12-13 | End: 2022-12-19

## 2022-12-13 RX ORDER — AMOXICILLIN 500 MG/1
500 CAPSULE ORAL 3 TIMES DAILY
Qty: 21 CAPSULE | Refills: 0 | Status: SHIPPED | OUTPATIENT
Start: 2022-12-13 | End: 2022-12-20

## 2022-12-13 SDOH — ECONOMIC STABILITY: FOOD INSECURITY: WITHIN THE PAST 12 MONTHS, YOU WORRIED THAT YOUR FOOD WOULD RUN OUT BEFORE YOU GOT MONEY TO BUY MORE.: NEVER TRUE

## 2022-12-13 SDOH — ECONOMIC STABILITY: FOOD INSECURITY: WITHIN THE PAST 12 MONTHS, THE FOOD YOU BOUGHT JUST DIDN'T LAST AND YOU DIDN'T HAVE MONEY TO GET MORE.: NEVER TRUE

## 2022-12-13 ASSESSMENT — ENCOUNTER SYMPTOMS
GASTROINTESTINAL NEGATIVE: 1
SORE THROAT: 1
EYES NEGATIVE: 1
BACK PAIN: 1
COUGH: 1

## 2022-12-13 ASSESSMENT — SOCIAL DETERMINANTS OF HEALTH (SDOH): HOW HARD IS IT FOR YOU TO PAY FOR THE VERY BASICS LIKE FOOD, HOUSING, MEDICAL CARE, AND HEATING?: NOT VERY HARD

## 2022-12-13 NOTE — PROGRESS NOTES
Subjective:      Patient ID: Opal Moctezuma is a 81 y.o. male. Cough  This is a new problem. The current episode started in the past 7 days. The problem has been unchanged. The problem occurs every few hours. The cough is Non-productive. Associated symptoms include nasal congestion, postnasal drip and a sore throat. The symptoms are aggravated by stress and pollens. He has tried rest for the symptoms. The treatment provided mild relief. His past medical history is significant for environmental allergies. Review of Systems   Constitutional: Negative. HENT:  Positive for postnasal drip and sore throat. Eyes: Negative. Respiratory:  Positive for cough. Cardiovascular: Negative. Gastrointestinal: Negative. Endocrine: Negative. Musculoskeletal:  Positive for arthralgias and back pain. Skin: Negative. Allergic/Immunologic: Positive for environmental allergies. Neurological: Negative. Hematological: Negative. Psychiatric/Behavioral:  The patient is nervous/anxious. Past family and social history unremarkable. Diagnosis Orders   1. Diabetes mellitus , without long-term current use of insulin (HCC)        2. URI, acute        3. Right kidney stone        4. Coronary artery disease involving native coronary artery of native heart without angina pectoris        5. Dyslipidemia        6. Class 2 severe obesity due to excess calories with serious comorbidity in adult, unspecified BMI ([address]        7. Chronic diastolic congestive heart failure ([address]        8. Essential hypertension        9. BPH with obstruction/lower urinary tract symptoms        10. Decreased hearing of both ears            Objective:   Physical Exam  Vitals and nursing note reviewed. Constitutional:       Appearance: He is well-developed. HENT:      Head: Normocephalic and atraumatic.       Right Ear: External ear normal.      Left Ear: External ear normal.      Nose: Nose normal.      Comments: Allergies allergic rhinitis  Eyes:      Conjunctiva/sclera: Conjunctivae normal.      Pupils: Pupils are equal, round, and reactive to light. Cardiovascular:      Rate and Rhythm: Normal rate and regular rhythm. Heart sounds: Normal heart sounds. Comments: Coronary artery disease  Diastolic congestive heart failure  Diabetes  Hypertension  Hyperlipidemia  Pulmonary:      Effort: Pulmonary effort is normal.      Breath sounds: Normal breath sounds. Abdominal:      General: Bowel sounds are normal.      Palpations: Abdomen is soft. Genitourinary:     Comments: BPH  Musculoskeletal:         General: Normal range of motion. Cervical back: Normal range of motion and neck supple. Comments: Degenerative polyarthralgia   Skin:     General: Skin is warm and dry. Neurological:      Mental Status: He is alert and oriented to person, place, and time. Deep Tendon Reflexes: Reflexes are normal and symmetric. Psychiatric:         Thought Content: Thought content normal.      Comments: Anxiety/depression       Assessment:       Diagnosis Orders   1. Diabetes mellitus , without long-term current use of insulin (HCC)        2. URI, acute        3. Right kidney stone        4. Coronary artery disease involving native coronary artery of native heart without angina pectoris        5. Dyslipidemia        6. Class 2 severe obesity due to excess calories with serious comorbidity in adult, unspecified BMI ([address]        7. Chronic diastolic congestive heart failure ([address]        8. Essential hypertension        9. BPH with obstruction/lower urinary tract symptoms        10. Decreased hearing of both ears                Plan:      82-year-old male is presented with URI/allergies allergic rhinitis. Overall he is well compensated. He is up-to-date influenza, and COVID vaccination. He is advised to push more oral fluid, over-the-counter Tylenol. He is placed on amoxicillin and oral steroid.   Diabetes mellitus well controlled on metformin, Praful Loza that he is tolerating well. A1c 6.9  Hemodynamically stable. Continue beta-blocker and ACE inhibitor that he is tolerating well. Consume less than 2 g of salt a day  Hyperlipidemia on statin that he is tolerating well  History of coronary artery disease, diastolic congestive heart failure. He is established however noncompliant with cardiology follow-up. Compliance is advised. He is asymptomatic  Hyperacusis. He is advised to consider audiology. He wished to postpone till after new year    Med list and available labs reviewed, discussed with patient, questions answered  He is encouraged to call for any concern  This note is created with a voice recognition program and while intend to generate a document that accurately reflects the content of the visit, no guarantee can be provided that every mistake has been identified and corrected by editing.       Alyse Dandy, MD

## 2022-12-13 NOTE — PROGRESS NOTES
Patient notice a couple of days ago bilateral leg swelling below knee  Also has had cough with thick white mucus for aboyt a week  Been taking OTC medication (mucinex)

## 2022-12-13 NOTE — TELEPHONE ENCOUNTER
Second attempt made to contact patient. Unable to leave message. Letter sent.     For [facility] in place:  No  Recommendation Provided To: Pharmacy: 2  Gap Closed?: No   Intervention Accepted By: Pharmacy: 2  Time Spent (min): 15

## 2022-12-21 DIAGNOSIS — I25.83 CORONARY ARTERY DISEASE DUE TO LIPID RICH PLAQUE: ICD-10-CM

## 2022-12-21 DIAGNOSIS — I25.10 CORONARY ARTERY DISEASE DUE TO LIPID RICH PLAQUE: ICD-10-CM

## 2022-12-21 RX ORDER — ATORVASTATIN CALCIUM 40 MG/1
40 TABLET, FILM COATED ORAL DAILY
Qty: 90 TABLET | Refills: 1 | Status: SHIPPED | OUTPATIENT
Start: 2022-12-21

## 2022-12-21 RX ORDER — LISINOPRIL 5 MG/1
TABLET ORAL
Qty: 90 TABLET | Refills: 1 | Status: SHIPPED | OUTPATIENT
Start: 2022-12-21

## 2022-12-21 RX ORDER — METOPROLOL TARTRATE 50 MG/1
50 TABLET, FILM COATED ORAL 2 TIMES DAILY
Qty: 180 TABLET | Refills: 1 | Status: SHIPPED | OUTPATIENT
Start: 2022-12-21

## 2022-12-21 NOTE — TELEPHONE ENCOUNTER
Antwan Portillo. is calling to request a refill on the following medication(s):    Medication Request:  Requested Prescriptions     Pending Prescriptions Disp Refills    atorvastatin (LIPITOR) 40 MG tablet 90 tablet 1     Sig: Take 1 tablet by mouth daily    lisinopril (PRINIVIL;ZESTRIL) 5 MG tablet 90 tablet 1     Sig: lisinopril 5 mg tablet   Take 1 tablet every day by oral route.     metoprolol tartrate (LOPRESSOR) 50 MG tablet 180 tablet 1     Sig: Take 1 tablet by mouth 2 times daily       Last Visit Date (If Applicable):  73/85/6570    Next Visit Date:    1/19/2023

## 2023-01-19 ENCOUNTER — OFFICE VISIT (OUTPATIENT)
Dept: INTERNAL MEDICINE CLINIC | Age: 73
End: 2023-01-19

## 2023-01-19 VITALS
SYSTOLIC BLOOD PRESSURE: 112 MMHG | HEIGHT: 70 IN | WEIGHT: 243 LBS | DIASTOLIC BLOOD PRESSURE: 64 MMHG | HEART RATE: 54 BPM | RESPIRATION RATE: 16 BRPM | TEMPERATURE: 97.8 F | BODY MASS INDEX: 34.79 KG/M2 | OXYGEN SATURATION: 98 %

## 2023-01-19 DIAGNOSIS — E78.5 DYSLIPIDEMIA: ICD-10-CM

## 2023-01-19 DIAGNOSIS — E66.01 CLASS 2 SEVERE OBESITY DUE TO EXCESS CALORIES WITH SERIOUS COMORBIDITY IN ADULT, UNSPECIFIED BMI (HCC): ICD-10-CM

## 2023-01-19 DIAGNOSIS — I10 ESSENTIAL HYPERTENSION: ICD-10-CM

## 2023-01-19 DIAGNOSIS — E08.00 DIABETES MELLITUS DUE TO UNDERLYING CONDITION WITH HYPEROSMOLARITY WITHOUT COMA, WITHOUT LONG-TERM CURRENT USE OF INSULIN (HCC): ICD-10-CM

## 2023-01-19 DIAGNOSIS — I25.10 CORONARY ARTERY DISEASE INVOLVING NATIVE CORONARY ARTERY OF NATIVE HEART WITHOUT ANGINA PECTORIS: ICD-10-CM

## 2023-01-19 DIAGNOSIS — Z12.11 COLON CANCER SCREENING: ICD-10-CM

## 2023-01-19 DIAGNOSIS — I50.32 CHRONIC DIASTOLIC CONGESTIVE HEART FAILURE (HCC): ICD-10-CM

## 2023-01-19 DIAGNOSIS — N13.8 BPH WITH OBSTRUCTION/LOWER URINARY TRACT SYMPTOMS: ICD-10-CM

## 2023-01-19 DIAGNOSIS — E11.9 TYPE 2 DIABETES MELLITUS WITHOUT COMPLICATION, WITHOUT LONG-TERM CURRENT USE OF INSULIN (HCC): Primary | ICD-10-CM

## 2023-01-19 DIAGNOSIS — N40.1 BPH WITH OBSTRUCTION/LOWER URINARY TRACT SYMPTOMS: ICD-10-CM

## 2023-01-19 DIAGNOSIS — N20.0 RIGHT KIDNEY STONE: ICD-10-CM

## 2023-01-19 DIAGNOSIS — H91.93 DECREASED HEARING OF BOTH EARS: ICD-10-CM

## 2023-01-19 ASSESSMENT — PATIENT HEALTH QUESTIONNAIRE - PHQ9
SUM OF ALL RESPONSES TO PHQ QUESTIONS 1-9: 0
2. FEELING DOWN, DEPRESSED OR HOPELESS: 0
SUM OF ALL RESPONSES TO PHQ QUESTIONS 1-9: 0
SUM OF ALL RESPONSES TO PHQ9 QUESTIONS 1 & 2: 0
1. LITTLE INTEREST OR PLEASURE IN DOING THINGS: 0
SUM OF ALL RESPONSES TO PHQ QUESTIONS 1-9: 0
SUM OF ALL RESPONSES TO PHQ QUESTIONS 1-9: 0

## 2023-01-19 ASSESSMENT — ENCOUNTER SYMPTOMS
GASTROINTESTINAL NEGATIVE: 1
ALLERGIC/IMMUNOLOGIC NEGATIVE: 1
BACK PAIN: 1
RESPIRATORY NEGATIVE: 1
EYES NEGATIVE: 1

## 2023-01-19 NOTE — PROGRESS NOTES
Subjective:      Patient ID: Lashell Oconnell is a 81 y.o. male. Diabetes  He presents for his follow-up diabetic visit. He has type 2 diabetes mellitus. The initial diagnosis of diabetes was made 9 years ago. His disease course has been improving. Hypoglycemia symptoms include nervousness/anxiousness. There are no hypoglycemic complications. Symptoms are stable. Diabetic complications include heart disease. Risk factors for coronary artery disease include diabetes mellitus, dyslipidemia, family history, obesity, male sex, hypertension and sedentary lifestyle. Current diabetic treatment includes oral agent (monotherapy). He is compliant with treatment all of the time. He is following a generally healthy diet. Meal planning includes ADA exchanges, avoidance of concentrated sweets, calorie counting and carbohydrate counting. He has had a previous visit with a dietitian. He participates in exercise three times a week. His home blood glucose trend is decreasing steadily. An ACE inhibitor/angiotensin II receptor blocker is being taken. Review of Systems   Constitutional: Negative. HENT: Negative. Eyes: Negative. Respiratory: Negative. Cardiovascular: Negative. Gastrointestinal: Negative. Endocrine: Negative. Musculoskeletal:  Positive for arthralgias and back pain. Skin: Negative. Allergic/Immunologic: Negative. Neurological: Negative. Hematological: Negative. Psychiatric/Behavioral:  The patient is nervous/anxious. Past family and social history unremarkable. Diagnosis Orders   1. Type 2 diabetes mellitus without complication, without long-term current use of insulin ([facility])  metFORMIN (GLUCOPHAGE) 1000 MG tablet      2. Right kidney stone        3. Chronic diastolic congestive heart failure ([address]        4. Coronary artery disease involving native coronary artery of native heart without angina pectoris        5. Dyslipidemia        6.  Class 2 severe obesity due to excess calories with serious comorbidity in adult, unspecified BMI ([address]        7. Essential hypertension        8. Diabetes mellitus , without long-term current use of insulin ([address]        9. BPH with obstruction/lower urinary tract symptoms        10. Decreased hearing of both ears        11. Colon cancer screening  Fecal DNA Colorectal cancer screening (Cologuard)          Objective:   Physical Exam  Vitals and nursing note reviewed. Constitutional:       Appearance: He is well-developed. Comments: Increased BMI   HENT:      Head: Normocephalic and atraumatic. Right Ear: External ear normal.      Left Ear: External ear normal.      Ears:      Comments: Hyperacusis bilateral     Nose: Nose normal.   Eyes:      Conjunctiva/sclera: Conjunctivae normal.      Pupils: Pupils are equal, round, and reactive to light. Cardiovascular:      Rate and Rhythm: Normal rate and regular rhythm. Heart sounds: Normal heart sounds. Comments: Coronary artery disease  Diastolic congestive heart failure  Hypertension  Hyperlipidemia  Diabetes mellitus  Pulmonary:      Effort: Pulmonary effort is normal.      Breath sounds: Normal breath sounds. Abdominal:      General: Bowel sounds are normal.      Palpations: Abdomen is soft. Genitourinary:     Comments: Asymptomatic kidney stone  BPH  Musculoskeletal:         General: Normal range of motion. Cervical back: Normal range of motion and neck supple. Comments: Degenerative polyarthralgia   Skin:     General: Skin is warm and dry. Neurological:      Mental Status: He is alert and oriented to person, place, and time. Deep Tendon Reflexes: Reflexes are normal and symmetric. Psychiatric:         Behavior: Behavior normal.         Thought Content: Thought content normal.       Assessment:       Diagnosis Orders   1.  Type 2 diabetes mellitus without complication, without long-term current use of insulin (HCC)  metFORMIN (GLUCOPHAGE) 1000 MG tablet 2. Right kidney stone        3. Chronic diastolic congestive heart failure ([address]        4. Coronary artery disease involving native coronary artery of native heart without angina pectoris        5. Dyslipidemia        6. Class 2 severe obesity due to excess calories with serious comorbidity in adult, unspecified BMI ([address]        7. Essential hypertension        8. Diabetes mellitus , without long-term current use of insulin ([address]        9. BPH with obstruction/lower urinary tract symptoms        10. Decreased hearing of both ears        11. Colon cancer screening  Fecal DNA Colorectal cancer screening ([facility])              Plan:      26-year-old  male returns for follow-up. He does not voice any new distress, afebrile hemodynamically stable, clinical examination is benign  Diabetes mellitus well controlled to metformin that he is tolerating well. A1c 6.9. Is advised to continue current regimen, ADA 1800 diet, daily moderate exercise  Hypertension well-controlled with random blood pressure equal to less than 130/80. He is tolerating ACE inhibitor well. Consume less than 2 g of salt a day  Hyperlipidemia on statin that he is tolerating well  Is counseled on diabetic foot care  He is advised to update annual dilated eye exam  History of coronary artery disease  Diastolic congestive heart failure. He is diuresing well in negative fluid balance. Continue beta-blocker and ACE inhibitor, Farxiga  Degenerative polyarthralgia. May take over-the-counter Tylenol with sparing use of anti-inflammatories  Hearing impairment.   He will be scheduled with hearing testing  Med list and available labs reviewed, discussed with patient, questions answered  He lives with his wife and continue to be comfortable with ADLs  Call for any concern  This note is created with a voice recognition program and while intend to generate a document that accurately reflects the content of the visit, no guarantee can be provided that every mistake has been identified and corrected by editing.           Juan Hernandez MD

## 2023-02-27 ENCOUNTER — OFFICE VISIT (OUTPATIENT)
Dept: PODIATRY | Age: 73
End: 2023-02-27
Payer: MEDICARE

## 2023-02-27 VITALS — HEIGHT: 70 IN | WEIGHT: 243 LBS | BODY MASS INDEX: 34.79 KG/M2

## 2023-02-27 DIAGNOSIS — I73.9 PERIPHERAL VASCULAR DISORDER (HCC): ICD-10-CM

## 2023-02-27 DIAGNOSIS — L60.0 INGROWN NAIL: ICD-10-CM

## 2023-02-27 DIAGNOSIS — M79.605 PAIN IN BOTH LOWER EXTREMITIES: ICD-10-CM

## 2023-02-27 DIAGNOSIS — M79.604 PAIN IN BOTH LOWER EXTREMITIES: ICD-10-CM

## 2023-02-27 DIAGNOSIS — B35.1 DERMATOPHYTOSIS OF NAIL: ICD-10-CM

## 2023-02-27 DIAGNOSIS — E11.9 TYPE 2 DIABETES MELLITUS WITHOUT COMPLICATION, WITHOUT LONG-TERM CURRENT USE OF INSULIN (HCC): Primary | ICD-10-CM

## 2023-02-27 PROCEDURE — 11721 DEBRIDE NAIL 6 OR MORE: CPT | Performed by: PODIATRIST

## 2023-02-27 PROCEDURE — 99213 OFFICE O/P EST LOW 20 MIN: CPT | Performed by: PODIATRIST

## 2023-02-27 PROCEDURE — 1123F ACP DISCUSS/DSCN MKR DOCD: CPT | Performed by: PODIATRIST

## 2023-02-27 NOTE — PROGRESS NOTES
[address] PODIATRY [facility]  [address]  [address]  Dept: [phone]  Dept Fax: [phone]    DIABETIC PROGRESS NOTE  Date of patient's visit: 2/27/2023  Patient's Name:  Phoebe Vargas. YOB: 1950            Patient Care Team:  Bernardo Marc MD as PCP - General (Family Medicine)  Franco Ruiz MD as PCP - Cardiology (Internal Medicine)  Bernardo Marc MD as PCP - Empaneled Provider  Dale Wetzel DO (Podiatry)  Geno Garg DPM as Physician (Podiatry)  Dede Callaway MD as Consulting Physician (Urology)  Mojgan Tony (Audiology)          Chief Complaint   Patient presents with    Diabetes     Diabetic foot care    Peripheral Neuropathy     Bilateral feet       Subjective:   Phoebe Vargas. comes to clinic for Diabetes (Diabetic foot care) and Peripheral Neuropathy (Bilateral feet)    he is a diabetic and states that he is doing well. Pt currently has complaint of thickened, elongated nails that they cannot manage by themselves. Pt's primary care physician is Bernardo Marc MD last seen 01/19/2023   Pt's last blood sugar was unknown. Pt has a new complaint of increased painful ingrown nail. Pt has tried changing shoes but it has not helped the pain       Lab Results   Component Value Date    LABA1C 6.9 (H) 09/12/2022      Complains of numbness in the feet bilat.   Past Medical History:   Diagnosis Date    Acute coronary syndrome (HCC)     CAD (coronary artery disease)     CABG X 2 2/24/2012    Chronic diastolic congestive heart failure ([facility] [address] 7/6/2016    Essential hypertension 12/9/2016    Hyperlipidemia 6/28/2012    Kidney stones     Metabolic syndrome     pre diabetes    Obesity 9/14/4894    Umbilical hernia        No Known Allergies  Current Outpatient Medications on File Prior to Visit   Medication Sig Dispense Refill    metFORMIN (GLUCOPHAGE) 1000 MG tablet TAKE ONE TABLET BY MOUTH TWICE A DAY WITH MEALS 180 tablet 1    atorvastatin (LIPITOR) 40 MG tablet Take 1 tablet by mouth daily 90 tablet 1    lisinopril (PRINIVIL;ZESTRIL) 5 MG tablet lisinopril 5 mg tablet   Take 1 tablet every day by oral route. 90 tablet 1    metoprolol tartrate (LOPRESSOR) 50 MG tablet Take 1 tablet by mouth 2 times daily 180 tablet 1    aspirin (ASPIRIN LOW DOSE) 81 MG chewable tablet CHEW ONE TABLET BY MOUTH DAILY 30 tablet 3    dapagliflozin (FARXIGA) 10 MG tablet Take 1 tablet by mouth every morning 90 tablet 2     No current facility-administered medications on file prior to visit. Review of Systems    Review of Systems:   History obtained from chart review and the patient  General ROS: negative for - chills, fatigue, fever, night sweats or weight gain  Constitutional: Negative for chills, diaphoresis, fatigue, fever and unexpected weight change. Musculoskeletal: Positive for arthralgias, gait problem and joint swelling. Neurological ROS: negative for - behavioral changes, confusion, headaches or seizures. Negative for weakness and numbness. Dermatological ROS: negative for - mole changes, rash  Cardiovascular: Negative for leg swelling. Gastrointestinal: Negative for constipation, diarrhea, nausea and vomiting. Objective:  Dermatologic Exam:  Increased painful ingrown nail to americo feet. Skin No rashes or nodules noted. .   Skin is thin, with flaky sloughing skin as well as decreased hair growth to the lower leg  Small red hemosiderin deposits seen dorsal foot   Musculoskeletal:     1st MPJ ROM decreased, Bilateral.  Muscle strength 5/5, Bilateral.  Pain present upon palpation of toenails 1-5, Bilateral. decreased medial longitudinal arch, Bilateral.  Ankle ROM decreased,Bilateral.    Dorsally contracted digits present digits 2, Bilateral.     Vascular: DP pulses 1/4 bilateral.  PT pulses 0/4 bilateral.   CFT <5 seconds, Bilateral.  Hair growth absent to the level of the digits, Bilateral.  Edema present, Bilateral.  Varicosities absent, Bilateral. Erythema absent, Bilateral    Neurological: Sensation diminshed to light touch to level of digits, Bilateral.  Protective sensation intact 6/10 sites via 5.07/10g [place] Monofilament, Bilateral.  negative Tinel's, Bilateral.  negative Valleix sign, Bilateral.      Integument: Warm, dry, supple, Bilateral.  Open lesion absent, Bilateral.  Interdigital maceration absent to web spaces 4, Bilateral.  Nails 1-5 left and 1-5 right thickened > 3.0 mm, dystrophic and crumbly, discolored with subungual debris. Fissures absent, Bilateral.   General: AAO x 3 in NAD.     Derm  Toenail Description  Sites of Onychomycosis Involvement (Check affected area)  [x] [x] [x] [x] [x] [x] [x] [x] [x] [x]  5 4 3 2 1 1 2 3 4 5                          Right                                        Left    Thickness  [x] [x] [x] [x] [x] [x] [x] [x] [x] [x]  5 4 3 2 1 1 2 3 4 5                         Right                                        Left    Dystrophic Changes   [x] [x] [x] [x] [x] [x] [x] [x] [x] [x]  5 4 3 2 1 1 2 3 4 5                         Right                                        Left    Color   [x] [x] [x] [x] [x] [x] [x] [x] [x] [x]  5 4 3 2 1 1 2 3 4 5                          Right                                        Left    Incurvation/Ingrowin   [] [] [] [] [] [] [] [] [] []  5 4 3 2 1 1 2 3 4 5                         Right                                        Left    Inflammation/Pain   [x] [x] [x] [x] [x] [x] [x] [x] [x] [x]  5 4 3 2 1 1 2 3 4 5                         Right                                        Left        Visual inspection:  Deformity: hammertoe deformity americo feet  amputation: absent  Skin lesions: absent  Edema: right- 2+ pitting edema, left- 2+ pitting edema    Sensory exam:  Monofilament sensation: abnormal - 6/10 via SW 5.07/10g monofilament to the plantar foot bilateral feet    Pulses: abnormal - 1/4 dorsalis pedis pulse and 0/4 Posterior tibial pulse,   Pinprick: Impaired  Proprioception: Impaired  Vibration (128 Hz): Impaired       DM with PVD       [x]Yes    []No      Assessment:  72 y.o. male with:   Diagnosis Orders   1. Type 2 diabetes mellitus without complication, without long-term current use of insulin (HCC)   DIABETES FOOT EXAM    64073 - CT DEBRIDEMENT OF NAILS, 6 OR MORE      2. Dermatophytosis of nail  HM DIABETES FOOT EXAM    87060 - CT DEBRIDEMENT OF NAILS, 6 OR MORE      3. Pain in both lower extremities  [facility] DIABETES FOOT EXAM    16383 - CT DEBRIDEMENT OF NAILS, 6 OR MORE      4. Peripheral vascular disorder (HCC)  HM DIABETES FOOT EXAM    67700 - CT DEBRIDEMENT OF NAILS, 6 OR MORE      5. Ingrown nail  [facility] DIABETES FOOT EXAM    91580 - CT DEBRIDEMENT OF NAILS, 6 OR MORE              Q7   []Yes    []No                Q8   [x]Yes    []No                     Q9   []Yes    []No    Plan:   Pt was evaluated and examined. Patient was given personalized discharge instructions.  Slant back procedure perform on nail border using nail nipper to patients tolerance. Advised pt to consider nail avulsion at next visit if symptoms persist or worsen. Pt to monitor for increased signs of infection such as erythema, edema and drainage.    Nails 1-10 were debrided sharply in length and thickness with a nipper and , without incident. Pt will follow up in 9 weeks or sooner if any problems arise. Diagnosis was discussed with the pt and all of their questions were answered in detail. Proper foot hygiene and care was discussed with the pt. Informed patient on proper diabetic foot care and importance of tight glycemic control.  Patient to check feet daily and contact the office with any questions/problems/concerns.   Other comorbidity noted and will be managed by PCP.  2/27/2023    Electronically signed by Amanda Mccabe DPM on 2/27/2023 at 8:10 AM  2/27/2023

## 2023-05-19 ENCOUNTER — OFFICE VISIT (OUTPATIENT)
Dept: INTERNAL MEDICINE CLINIC | Age: 73
End: 2023-05-19

## 2023-05-19 ENCOUNTER — HOSPITAL ENCOUNTER (OUTPATIENT)
Age: 73
Setting detail: SPECIMEN
Discharge: HOME | End: 2023-05-19

## 2023-05-19 VITALS
HEIGHT: 70 IN | SYSTOLIC BLOOD PRESSURE: 114 MMHG | TEMPERATURE: 98 F | HEART RATE: 57 BPM | OXYGEN SATURATION: 96 % | RESPIRATION RATE: 20 BRPM | WEIGHT: 239.6 LBS | BODY MASS INDEX: 34.3 KG/M2 | DIASTOLIC BLOOD PRESSURE: 70 MMHG

## 2023-05-19 DIAGNOSIS — E78.5 DYSLIPIDEMIA: ICD-10-CM

## 2023-05-19 DIAGNOSIS — I50.32 CHRONIC DIASTOLIC CONGESTIVE HEART FAILURE (HCC): ICD-10-CM

## 2023-05-19 DIAGNOSIS — N20.0 RIGHT KIDNEY STONE: ICD-10-CM

## 2023-05-19 DIAGNOSIS — I10 ESSENTIAL HYPERTENSION: ICD-10-CM

## 2023-05-19 DIAGNOSIS — Z12.11 COLON CANCER SCREENING: ICD-10-CM

## 2023-05-19 DIAGNOSIS — E66.01 CLASS 2 SEVERE OBESITY DUE TO EXCESS CALORIES WITH SERIOUS COMORBIDITY IN ADULT, UNSPECIFIED BMI (HCC): ICD-10-CM

## 2023-05-19 DIAGNOSIS — E08.00 DIABETES MELLITUS DUE TO UNDERLYING CONDITION WITH HYPEROSMOLARITY WITHOUT COMA, WITHOUT LONG-TERM CURRENT USE OF INSULIN (HCC): ICD-10-CM

## 2023-05-19 DIAGNOSIS — E11.9 TYPE 2 DIABETES MELLITUS WITHOUT COMPLICATION, WITHOUT LONG-TERM CURRENT USE OF INSULIN (HCC): ICD-10-CM

## 2023-05-19 DIAGNOSIS — I25.10 CORONARY ARTERY DISEASE DUE TO LIPID RICH PLAQUE: ICD-10-CM

## 2023-05-19 DIAGNOSIS — N40.1 BPH WITH OBSTRUCTION/LOWER URINARY TRACT SYMPTOMS: ICD-10-CM

## 2023-05-19 DIAGNOSIS — H91.93 DECREASED HEARING OF BOTH EARS: ICD-10-CM

## 2023-05-19 DIAGNOSIS — Z00.00 MEDICARE ANNUAL WELLNESS VISIT, SUBSEQUENT: Primary | ICD-10-CM

## 2023-05-19 DIAGNOSIS — I25.83 CORONARY ARTERY DISEASE DUE TO LIPID RICH PLAQUE: ICD-10-CM

## 2023-05-19 DIAGNOSIS — I25.10 CORONARY ARTERY DISEASE INVOLVING NATIVE CORONARY ARTERY OF NATIVE HEART WITHOUT ANGINA PECTORIS: ICD-10-CM

## 2023-05-19 DIAGNOSIS — N13.8 BPH WITH OBSTRUCTION/LOWER URINARY TRACT SYMPTOMS: ICD-10-CM

## 2023-05-19 LAB
EST. AVERAGE GLUCOSE BLD GHB EST-MCNC: 232 MG/DL
HBA1C MFR BLD: 9.7 % (ref 4–6)
TSH SERPL-MCNC: 2.16 UIU/ML (ref 0.3–5)

## 2023-05-19 RX ORDER — LISINOPRIL 5 MG/1
TABLET ORAL
Qty: 90 TABLET | Refills: 1 | Status: SHIPPED | OUTPATIENT
Start: 2023-05-19

## 2023-05-19 RX ORDER — METOPROLOL TARTRATE 50 MG/1
50 TABLET, FILM COATED ORAL 2 TIMES DAILY
Qty: 180 TABLET | Refills: 1 | Status: SHIPPED | OUTPATIENT
Start: 2023-05-19

## 2023-05-19 RX ORDER — ATORVASTATIN CALCIUM 40 MG/1
40 TABLET, FILM COATED ORAL DAILY
Qty: 90 TABLET | Refills: 1 | Status: SHIPPED | OUTPATIENT
Start: 2023-05-19

## 2023-05-19 RX ORDER — ASPIRIN 81 MG/1
TABLET, CHEWABLE ORAL
Qty: 200 TABLET | Refills: 1 | Status: SHIPPED | OUTPATIENT
Start: 2023-05-19

## 2023-05-19 SDOH — ECONOMIC STABILITY: FOOD INSECURITY: WITHIN THE PAST 12 MONTHS, YOU WORRIED THAT YOUR FOOD WOULD RUN OUT BEFORE YOU GOT MONEY TO BUY MORE.: NEVER TRUE

## 2023-05-19 SDOH — ECONOMIC STABILITY: INCOME INSECURITY: HOW HARD IS IT FOR YOU TO PAY FOR THE VERY BASICS LIKE FOOD, HOUSING, MEDICAL CARE, AND HEATING?: NOT HARD AT ALL

## 2023-05-19 SDOH — ECONOMIC STABILITY: FOOD INSECURITY: WITHIN THE PAST 12 MONTHS, THE FOOD YOU BOUGHT JUST DIDN'T LAST AND YOU DIDN'T HAVE MONEY TO GET MORE.: NEVER TRUE

## 2023-05-19 SDOH — ECONOMIC STABILITY: HOUSING INSECURITY
IN THE LAST 12 MONTHS, WAS THERE A TIME WHEN YOU DID NOT HAVE A STEADY PLACE TO SLEEP OR SLEPT IN A SHELTER (INCLUDING NOW)?: NO

## 2023-05-19 ASSESSMENT — PATIENT HEALTH QUESTIONNAIRE - PHQ9
SUM OF ALL RESPONSES TO PHQ QUESTIONS 1-9: 0
2. FEELING DOWN, DEPRESSED OR HOPELESS: 0
SUM OF ALL RESPONSES TO PHQ QUESTIONS 1-9: 0
1. LITTLE INTEREST OR PLEASURE IN DOING THINGS: 0
SUM OF ALL RESPONSES TO PHQ9 QUESTIONS 1 & 2: 0
SUM OF ALL RESPONSES TO PHQ QUESTIONS 1-9: 0
SUM OF ALL RESPONSES TO PHQ QUESTIONS 1-9: 0

## 2023-05-19 ASSESSMENT — LIFESTYLE VARIABLES: HOW OFTEN DO YOU HAVE A DRINK CONTAINING ALCOHOL: NEVER

## 2023-05-19 NOTE — PROGRESS NOTES
130/80  Hyperlipidemia on statin that he is tolerating well  He is counseled on diabetic foot care  He is advised to update annual dilated eye exam  History of obesity. He is gradually losing weight. History of asymptomatic kidney stone. Push more oral fluid  Hearing impairment. He is advised to consider audiology consultation  Med list reviewed advised to continue  Further recommendations to follow updated labs  Call for any concern    CareTeam (Including outside providers/suppliers regularly involved in providing care):   Patient Care Team:  Zacarias Denny MD as PCP - General (Family Medicine)  Cecilia Dumont MD as PCP - Cardiology (Internal Medicine)  Zacarias Denny MD as PCP - Empaneled Provider  Kate oGlden DO (Podiatry)  Miky Wilson DPM as Physician (Podiatry)  Sarah Nicolas MD as Consulting Physician (Urology)  Yosvany Lundberg (Audiology)     Reviewed and updated this visit:  Tobacco  Allergies  Meds  Problems  Med Hx  Surg Hx  Soc Hx  Fam Hx      This note is created with a voice recognition program and while intend to generate a document that accurately reflects the content of the visit, no guarantee can be provided that every mistake has been identified and corrected by editing.            Zacarias Denny MD

## 2023-05-19 NOTE — PATIENT INSTRUCTIONS
Learning About Being Active as an Older Adult  Why is being active important as you get older? Being active is one of the best things you can do for your health. And it's never too late to start. Being active--or getting active, if you aren't already--has definite benefits. It can:  Give you more energy,  Keep your mind sharp. Improve balance to reduce your risk of falls. Help you manage chronic illness with fewer medicines. No matter how old you are, how fit you are, or what health problems you have, there is a form of activity that will work for you. And the more physical activity you can do, the better your overall health will be. What kinds of activity can help you stay healthy? Being more active will make your daily activities easier. Physical activity includes planned exercise and things you do in daily life. There are four types of activity:  Aerobic. Doing aerobic activity makes your heart and lungs strong. Includes walking, dancing, and gardening. Aim for at least 2½ hours spread throughout the week. It improves your energy and can help you sleep better. Muscle-strengthening. This type of activity can help maintain muscle and strengthen bones. Includes climbing stairs, using resistance bands, and lifting or carrying heavy loads. Aim for at least twice a week. It can help protect the knees and other joints. Stretching. Stretching gives you better range of motion in joints and muscles. Includes upper arm stretches, calf stretches, and gentle yoga. Aim for at least twice a week, preferably after your muscles are warmed up from other activities. It can help you function better in daily life. Balancing. This helps you stay coordinated and have good posture. Includes heel-to-toe walking, norman chi, and certain types of yoga. Aim for at least 3 days a week. It can reduce your risk of falling.   Even if you have a hard time meeting the recommendations, it's better to be more active

## 2023-05-23 ENCOUNTER — TELEPHONE (OUTPATIENT)
Dept: INTERNAL MEDICINE CLINIC | Age: 73
End: 2023-05-23

## 2023-07-20 DIAGNOSIS — E11.9 TYPE 2 DIABETES MELLITUS WITHOUT COMPLICATION, WITHOUT LONG-TERM CURRENT USE OF INSULIN (HCC): ICD-10-CM

## 2023-08-03 DIAGNOSIS — E11.9 TYPE 2 DIABETES MELLITUS WITHOUT COMPLICATION, WITHOUT LONG-TERM CURRENT USE OF INSULIN (HCC): ICD-10-CM

## 2023-08-03 NOTE — TELEPHONE ENCOUNTER
[facility] [place] CLINICAL PHARMACY: ADHERENCE REVIEW  Identified care gap per [employer]: fills at [facility]: ACE/ARB and Diabetes adherence      ASSESSMENT    ACE/ARB ADHERENCE    Insurance Records claims through 23 (Prior Year  [address] = not reported; YTD  [address] = FIRST FILL; Potential Fail Date: 23):   LISINOPRIL   TAB 5MG last filled on 3/23/23 for 90 day supply. Next refill due: 23    Prescribed si tablet/capsule daily    Per Insurer Portal:  -23  +/23    Per [address]: will get 90 day supply ready to  since past due. BP Readings from Last 3 Encounters:   23 114/70   23 112/64   22 138/72     CrCl cannot be calculated (Patient's most recent lab result is older than the maximum 180 days allowed. ). Lab Results   Component Value Date    CREATININE 0.81 2022     Lab Results   Component Value Date    K 4.1 2022     DIABETES ADHERENCE    Insurance Records claims through 23 (Prior Year PDC = 95% - PASSED; YTD PDC = 97%; Potential Fail Date: 10/21/23):   FARXIGA      TAB 10MG last filled on 23 for 90 day supply. Next refill due: 23    Metforming 1000mg- take one tablet by mouth twice a day with meals- per evoke last filled on 23 due to fill now 0 refills remain  Lab Results   Component Value Date    LABA1C 9.7 (H) 2023    LABA1C 6.9 (H) 2022    LABA1C 7.0 02/10/2022       PLAN  Per insurer report, LIS-0 - co-pays are based on tiers and patient is subject to coverage gap.     Patient found in Outcomes MTM and is currently eligible for 4 TIP    The following are interventions that have been identified:   Patient overdue refilling lisinopril 5mg and active on home medication list.   Refill/s of Lisinopril 5mg READY to  at patient's [address] to pharmacist. Patient has 0 refills left of his metformin 1000mg (refill request denined on 23)    Did not contact pt yet due to seeing if we can get refill of

## 2023-08-03 NOTE — TELEPHONE ENCOUNTER
Ventura Valdivia MD, patient out of refills and patient eligible for up to 100-day supply, if appropriate.  Rx(s) pended for your signature/modification as appropriate    Last Visit: 5/19/23  Next Visit: 9/20/23    Thank you,  Bigg Fofana, PharmD, [address] [place] [address], toll free: [phone], option 1

## 2023-08-03 NOTE — TELEPHONE ENCOUNTER
Noted metformin rx for 100ds approved, thank you!     Letter sent to patient.    =======================================================   For Pharmacy Admin Tracking Only    Program: Abelino in place:  No  Recommendation Provided To: Provider: 1 via Note to Provider and Pharmacy: 1  Intervention Detail: Adherence Monitorin and New Rx: 1, reason: Improve Adherence  Intervention Accepted By: Provider: 1 and Pharmacy: 1  Gap Closed?: No   Time Spent (min): 15

## 2023-08-03 NOTE — TELEPHONE ENCOUNTER
Appears 7/20/23 metformin refill request denied as \"Patient has requested refill too soon . .. last refill 5/19/23 for 90 day and 1 refill\". However, was last ordered 1/19/23 for #180 (90-ds) with 1 refill? Will re-pend to PCP.

## 2023-08-28 ENCOUNTER — OFFICE VISIT (OUTPATIENT)
Dept: PODIATRY | Age: 73
End: 2023-08-28
Payer: MEDICARE

## 2023-08-28 VITALS — WEIGHT: 239 LBS | HEIGHT: 70 IN | BODY MASS INDEX: 34.22 KG/M2

## 2023-08-28 DIAGNOSIS — M79.605 PAIN IN BOTH LOWER EXTREMITIES: ICD-10-CM

## 2023-08-28 DIAGNOSIS — L85.3 XEROSIS CUTIS: ICD-10-CM

## 2023-08-28 DIAGNOSIS — E11.9 TYPE 2 DIABETES MELLITUS WITHOUT COMPLICATION, WITHOUT LONG-TERM CURRENT USE OF INSULIN (HCC): Primary | ICD-10-CM

## 2023-08-28 DIAGNOSIS — M79.604 PAIN IN BOTH LOWER EXTREMITIES: ICD-10-CM

## 2023-08-28 DIAGNOSIS — L60.0 INGROWN NAIL: ICD-10-CM

## 2023-08-28 DIAGNOSIS — I73.9 PERIPHERAL VASCULAR DISORDER (HCC): ICD-10-CM

## 2023-08-28 DIAGNOSIS — B35.1 DERMATOPHYTOSIS OF NAIL: ICD-10-CM

## 2023-08-28 PROCEDURE — 3046F HEMOGLOBIN A1C LEVEL >9.0%: CPT | Performed by: PODIATRIST

## 2023-08-28 PROCEDURE — 99213 OFFICE O/P EST LOW 20 MIN: CPT | Performed by: PODIATRIST

## 2023-08-28 PROCEDURE — 1123F ACP DISCUSS/DSCN MKR DOCD: CPT | Performed by: PODIATRIST

## 2023-08-28 PROCEDURE — 11721 DEBRIDE NAIL 6 OR MORE: CPT | Performed by: PODIATRIST

## 2023-08-28 NOTE — PROGRESS NOTES
[facility],[address] PODIATRY [address] [place] [address]  Dept: [phone]  Dept Fax: [phone]    DIABETIC PROGRESS NOTE  Date of patient's visit: 8/28/2023  Patient's Name:  Lupe Lin. YOB: 1950            Patient Care Team:  Gala Perdue MD as PCP - General (Family Medicine)  Helen Rushing MD as PCP - Cardiology (Internal Medicine)  Gala Perdue MD as PCP - Empaneled Provider  Renetta Marin DO (Podiatry)  Arnaldo Ibarra DPM as Physician (Podiatry)  Estrada Cannon MD as Consulting Physician (Urology)  Ernst Nieves (Audiology)  Tony Waterman (Endocrinology)          Chief Complaint   Patient presents with    Diabetes     Diabetic foot care   A1c 9.7      Peripheral Neuropathy     Bilateral feet        Subjective:   Lupe Lin. comes to clinic for Diabetes (Diabetic foot care /A1c 9.7/) and Peripheral Neuropathy (Bilateral feet )    he is a diabetic and states that needs toenails trimmed today. Pt currently has complaint of thickened, elongated nails that they cannot manage by themselves. Pt's primary care physician is Gala Perdue MD last seen 5/19/23. Pt's last blood sugar/a1c was 9.7. Pt has a new complaint of increased dry skin to americo feet. Lab Results   Component Value Date    LABA1C 9.7 (H) 05/19/2023      Complains of numbness in the feet bilat.   Past Medical History:   Diagnosis Date    Acute coronary syndrome (HCC)     CAD (coronary artery disease)     CABG X 2 2/24/2012    Chronic diastolic congestive heart failure ([address]) 7/6/2016    Essential hypertension 12/9/2016    Hyperlipidemia 6/28/2012    Kidney stones     Metabolic syndrome     pre diabetes    Obesity 9/88/6291    Umbilical hernia        No Known Allergies  Current Outpatient Medications on File Prior to Visit   Medication Sig Dispense Refill    metFORMIN (GLUCOPHAGE) 1000 MG tablet Take 1 tablet

## 2023-08-29 ENCOUNTER — HOSPITAL ENCOUNTER (OUTPATIENT)
Dept: GENERAL RADIOLOGY | Age: 73
Discharge: HOME | End: 2023-08-31
Payer: MEDICARE

## 2023-08-29 ENCOUNTER — HOSPITAL ENCOUNTER (OUTPATIENT)
Age: 73
Discharge: HOME | End: 2023-08-31
Payer: MEDICARE

## 2023-08-29 DIAGNOSIS — N20.0 RIGHT KIDNEY STONE: ICD-10-CM

## 2023-08-29 PROCEDURE — 74018 RADEX ABDOMEN 1 VIEW: CPT

## 2023-09-07 ENCOUNTER — OFFICE VISIT (OUTPATIENT)
Age: 73
End: 2023-09-07
Payer: MEDICARE

## 2023-09-07 VITALS — HEIGHT: 70 IN | BODY MASS INDEX: 34.22 KG/M2 | WEIGHT: 239 LBS

## 2023-09-07 DIAGNOSIS — N13.8 BPH WITH OBSTRUCTION/LOWER URINARY TRACT SYMPTOMS: ICD-10-CM

## 2023-09-07 DIAGNOSIS — R35.1 NOCTURIA: ICD-10-CM

## 2023-09-07 DIAGNOSIS — N20.0 RIGHT NEPHROLITHIASIS: Primary | ICD-10-CM

## 2023-09-07 DIAGNOSIS — N40.1 BPH WITH OBSTRUCTION/LOWER URINARY TRACT SYMPTOMS: ICD-10-CM

## 2023-09-07 LAB
BILIRUBIN, POC: NORMAL
BLOOD URINE, POC: NORMAL
CLARITY, POC: CLEAR
COLOR, POC: YELLOW
GLUCOSE URINE, POC: NORMAL
KETONES, POC: NORMAL
LEUKOCYTE EST, POC: NORMAL
NITRITE, POC: NORMAL
PH, POC: NORMAL
PROTEIN, POC: NORMAL
SPECIFIC GRAVITY, POC: NORMAL
UROBILINOGEN, POC: NORMAL

## 2023-09-07 PROCEDURE — 1123F ACP DISCUSS/DSCN MKR DOCD: CPT | Performed by: SPECIALIST

## 2023-09-07 PROCEDURE — 81003 URINALYSIS AUTO W/O SCOPE: CPT | Performed by: SPECIALIST

## 2023-09-07 PROCEDURE — 99214 OFFICE O/P EST MOD 30 MIN: CPT | Performed by: SPECIALIST

## 2023-09-07 NOTE — PROGRESS NOTES
[facility] Neal, [address] [place] [address] Urology Office Progress Note    Patient:  Rich Cosme. YOB: 1950  Date: 9/7/2023    HISTORY OF PRESENT ILLNESS:   The patient is a 81 y.o. male  BPH symptoms are mild and not bothersome and thus medical therapy not indicated. Patient cannot have JASON's so I will order a Serum PSA to screen for prostate cancer next year. No flank pain or gross hematuria to suggest recurrent kidney stones. Patient's 8/29/23 KUB shows a stable Right non-obstructing kidney stone; no Rx required. Patient instructed to drink at least 10 eight ounce glasses of water a day and avoid excessive consumption of sodium and animal protein to decrease risk of recurrent kidney stones. Patient instructed to limit fluid intake 2-3 hours prior to bedtime to minimize nocturia or nocturnal incontinence.    Lower urinary tract symptoms: nocturia, 3 times per night   Last AUA Symptom Score (QOL): 2 (0)  Today's AUA Symptom Score (QOL): 3 (1)    Summary of old records:   Difficult JASON, [place]  BPH: mild, 9/23/19 PVR = 33.47 mL, uroflow: 163 mL, 12/4.7 mL/sec  History of KS, passed one in August 2019, 9/9/19, 9/14/20 KUB neg  R 6 mm KS on 9/12/22, 8/29/23 KUB; cut back on Diet Coke    Additional History: none    Procedures Today: N/A    Urinalysis today:  Results for POC orders placed in visit on 09/07/23   POCT Urinalysis No Micro (Auto)   Result Value Ref Range    Color, UA YELLOW     Clarity, UA CLEAR     Glucose, UA POC >=1000 MG     Bilirubin, UA      Ketones, UA      Spec Grav, UA      Blood, UA POC NEG     pH, UA      Protein, UA POC NEG     Urobilinogen, UA      Leukocytes, UA NEG     Nitrite, UA NEG        Last several PSA's:  Lab Results   Component Value Date    PSA 0.66 09/12/2022    PSA 0.66 09/12/2022    PSA 0.35 09/09/2019       Last total testosterone:  No results found for: TESTOSTERONE    Last BUN and creatinine:  Lab Results   Component Value Date    BUN

## 2023-09-19 ENCOUNTER — TELEPHONE (OUTPATIENT)
Dept: PHARMACY | Facility: CLINIC | Age: 73
End: 2023-09-19

## 2023-09-19 NOTE — TELEPHONE ENCOUNTER
[facility] [place] CLINICAL PHARMACY: ADHERENCE REVIEW  Identified care gap per [employer]: fills at [facility]: ACE/ARB, Diabetes, and Statin adherence    ASSESSMENT    ACE/ARB ADHERENCE    Insurance Records claims through 23 (Prior Year [address] = 81% - PASSED; YTD [address] = 74%; Potential Fail Date: 23):   LISINOPRIL TAB 5MG last filled on 8/3/23 for 90 day supply. Next refill due: 23    Prescribed si tablet/capsule daily    Per Reconcile Dispense History: last filled on 8/3/23 for 90 day supply. BP Readings from Last 3 Encounters:   23 114/70   23 112/64   22 138/72     CrCl cannot be calculated (Patient's most recent lab result is older than the maximum 180 days allowed. ). Lab Results   Component Value Date    CREATININE 0.81 2022     Lab Results   Component Value Date    K 4.1 2022     DIABETES ADHERENCE    Insurance Records claims through 23 (Prior Year PDC = 95% - PASSED; YTD PDC = 98% - PASSED): METFORMIN TAB 1000MG last filled on 23 for 100 day supply. Next refill due: 23    Prescribed si tablet/capsule twice daily    Per Reconcile Dispense History: last filled on 23 for 100 day supply. Lab Results   Component Value Date    LABA1C 9.7 (H) 2023    LABA1C 6.9 (H) 2022    LABA1C 7.0 02/10/2022     NOTE: A1c >9%    STATIN ADHERENCE    Insurance Records claims through 23 (Prior Year PDC = not reported; YTD [address] = 80%; Potential Fail Date: 10/1/13):   ATORVASTATIN TAB 40MG last filled on 23 for 90 day supply. Next refill due: 23    Prescribed si tablet/capsule daily    Per Reconcile Dispense History: last filled on 23 for 90 day supply. Per [address]: will get 90 day supply ready to  since past due. Will be ready tomorrow after 2:00pm.     Lab Results   Component Value Date    CHOL 128 2022    TRIG 121 2022    HDL 47 2022    LDLCHOLESTEROL 57 2022     ALT   Date Value Ref

## 2023-09-20 ENCOUNTER — OFFICE VISIT (OUTPATIENT)
Dept: INTERNAL MEDICINE CLINIC | Age: 73
End: 2023-09-20
Payer: MEDICARE

## 2023-09-20 VITALS
OXYGEN SATURATION: 97 % | HEART RATE: 51 BPM | DIASTOLIC BLOOD PRESSURE: 60 MMHG | HEIGHT: 70 IN | WEIGHT: 227 LBS | TEMPERATURE: 97.5 F | SYSTOLIC BLOOD PRESSURE: 122 MMHG | BODY MASS INDEX: 32.5 KG/M2 | RESPIRATION RATE: 10 BRPM

## 2023-09-20 DIAGNOSIS — H91.93 DECREASED HEARING OF BOTH EARS: ICD-10-CM

## 2023-09-20 DIAGNOSIS — N20.0 RIGHT KIDNEY STONE: Primary | ICD-10-CM

## 2023-09-20 DIAGNOSIS — E10.65 TYPE 1 DIABETES MELLITUS WITH HYPERGLYCEMIA (HCC): Primary | ICD-10-CM

## 2023-09-20 DIAGNOSIS — E78.5 DYSLIPIDEMIA: ICD-10-CM

## 2023-09-20 DIAGNOSIS — E66.01 CLASS 2 SEVERE OBESITY DUE TO EXCESS CALORIES WITH SERIOUS COMORBIDITY IN ADULT, UNSPECIFIED BMI (HCC): ICD-10-CM

## 2023-09-20 DIAGNOSIS — N40.1 BPH WITH OBSTRUCTION/LOWER URINARY TRACT SYMPTOMS: ICD-10-CM

## 2023-09-20 DIAGNOSIS — I10 ESSENTIAL HYPERTENSION: ICD-10-CM

## 2023-09-20 DIAGNOSIS — E10.65 TYPE 1 DIABETES MELLITUS WITH HYPERGLYCEMIA (HCC): ICD-10-CM

## 2023-09-20 DIAGNOSIS — N13.8 BPH WITH OBSTRUCTION/LOWER URINARY TRACT SYMPTOMS: ICD-10-CM

## 2023-09-20 DIAGNOSIS — I50.32 CHRONIC DIASTOLIC CONGESTIVE HEART FAILURE (HCC): ICD-10-CM

## 2023-09-20 DIAGNOSIS — I25.10 CORONARY ARTERY DISEASE INVOLVING NATIVE CORONARY ARTERY OF NATIVE HEART WITHOUT ANGINA PECTORIS: ICD-10-CM

## 2023-09-20 PROBLEM — E11.9 TYPE 2 DIABETES MELLITUS (HCC): Status: RESOLVED | Noted: 2023-05-19 | Resolved: 2023-09-20

## 2023-09-20 PROBLEM — E08.00 DIABETES MELLITUS DUE TO UNDERLYING CONDITION WITH HYPEROSMOLARITY WITHOUT COMA, WITHOUT LONG-TERM CURRENT USE OF INSULIN (HCC): Status: RESOLVED | Noted: 2018-03-22 | Resolved: 2023-09-20

## 2023-09-20 LAB — HBA1C MFR BLD: 11 %

## 2023-09-20 PROCEDURE — 3046F HEMOGLOBIN A1C LEVEL >9.0%: CPT | Performed by: FAMILY MEDICINE

## 2023-09-20 PROCEDURE — 83036 HEMOGLOBIN GLYCOSYLATED A1C: CPT | Performed by: FAMILY MEDICINE

## 2023-09-20 PROCEDURE — 1123F ACP DISCUSS/DSCN MKR DOCD: CPT | Performed by: FAMILY MEDICINE

## 2023-09-20 PROCEDURE — 3074F SYST BP LT 130 MM HG: CPT | Performed by: FAMILY MEDICINE

## 2023-09-20 PROCEDURE — 99214 OFFICE O/P EST MOD 30 MIN: CPT | Performed by: FAMILY MEDICINE

## 2023-09-20 PROCEDURE — 3078F DIAST BP <80 MM HG: CPT | Performed by: FAMILY MEDICINE

## 2023-09-20 RX ORDER — INSULIN GLARGINE 100 [IU]/ML
20 INJECTION, SOLUTION SUBCUTANEOUS NIGHTLY
Qty: 5 ADJUSTABLE DOSE PRE-FILLED PEN SYRINGE | Refills: 0 | Status: SHIPPED | OUTPATIENT
Start: 2023-09-20

## 2023-09-20 ASSESSMENT — ENCOUNTER SYMPTOMS
GASTROINTESTINAL NEGATIVE: 1
RESPIRATORY NEGATIVE: 1
EYES NEGATIVE: 1
ALLERGIC/IMMUNOLOGIC NEGATIVE: 1

## 2023-09-20 NOTE — PROGRESS NOTES
CBC    Comprehensive Metabolic Panel    Lipid Panel    Microalbumin, Ur    Urinalysis    TSH    PSA Screening      2. Right kidney stone        3. Coronary artery disease involving native coronary artery of native heart without angina pectoris  CBC    Comprehensive Metabolic Panel    Lipid Panel    Microalbumin, Ur    Urinalysis    TSH    PSA Screening      4. Dyslipidemia  CBC    Comprehensive Metabolic Panel    Lipid Panel    Microalbumin, Ur    Urinalysis    TSH    PSA Screening      5. Class 2 severe obesity due to excess calories with serious comorbidity in adult, unspecified BMI ([address])        6. Chronic diastolic congestive heart failure (HCC)  CBC    Comprehensive Metabolic Panel    Lipid Panel    Microalbumin, Ur    Urinalysis    TSH    PSA Screening      7. Essential hypertension        8. BPH with obstruction/lower urinary tract symptoms        9. Decreased hearing of both ears                Plan:      82-year-old  male returns for follow-up. He does not voice any new distress, afebrile hemodynamically stable  History of uncontrolled diabetes mellitus with A1c of 9.5. During his last visit he was scheduled to be seen by endocrinology however he did not follow through. Compliance is advised. We will reconsult. A1c done today is 11. He agrees to be placed on insulin. He will be provided with education and know how about self injection. We will also provide him with CGM. He is advised ADA 1800 diet, daily moderate exercise and lifestyle change. I will see him back in 1 month for ongoing adjustment of insulin to optimize his diabetes. He is advised to continue metformin and [place]. Hypertension well-controlled with random blood pressure equal less than 130/80. Continue lisinopril that he is tolerating well. Optimize diabetes and consume less than 2 g of salt a day  Hyperlipidemia on statin that he is tolerating well  Hyperacusis.   He is advised to see audiologist  Degenerative

## 2023-09-21 ENCOUNTER — HOSPITAL ENCOUNTER (OUTPATIENT)
Age: 73
Setting detail: SPECIMEN
Discharge: HOME | End: 2023-09-21

## 2023-09-21 DIAGNOSIS — E78.5 DYSLIPIDEMIA: ICD-10-CM

## 2023-09-21 DIAGNOSIS — I50.32 CHRONIC DIASTOLIC CONGESTIVE HEART FAILURE (HCC): ICD-10-CM

## 2023-09-21 DIAGNOSIS — N40.1 BPH WITH OBSTRUCTION/LOWER URINARY TRACT SYMPTOMS: ICD-10-CM

## 2023-09-21 DIAGNOSIS — E10.65 TYPE 1 DIABETES MELLITUS WITH HYPERGLYCEMIA (HCC): ICD-10-CM

## 2023-09-21 DIAGNOSIS — I25.10 CORONARY ARTERY DISEASE INVOLVING NATIVE CORONARY ARTERY OF NATIVE HEART WITHOUT ANGINA PECTORIS: ICD-10-CM

## 2023-09-21 DIAGNOSIS — N13.8 BPH WITH OBSTRUCTION/LOWER URINARY TRACT SYMPTOMS: ICD-10-CM

## 2023-09-21 LAB
ALBUMIN SERPL-MCNC: 3.9 G/DL (ref 3.5–5.2)
ALBUMIN/GLOB SERPL: 1.4 {RATIO} (ref 1–2.5)
ALP SERPL-CCNC: 80 U/L (ref 40–129)
ALT SERPL-CCNC: 12 U/L (ref 5–41)
ANION GAP SERPL CALCULATED.3IONS-SCNC: 13 MMOL/L (ref 9–17)
AST SERPL-CCNC: 13 U/L
BILIRUB SERPL-MCNC: 1.3 MG/DL (ref 0.3–1.2)
BILIRUB UR QL STRIP: NEGATIVE
BUN SERPL-MCNC: 12 MG/DL (ref 8–23)
CALCIUM SERPL-MCNC: 8.9 MG/DL (ref 8.6–10.4)
CHLORIDE SERPL-SCNC: 99 MMOL/L (ref 98–107)
CHOLEST SERPL-MCNC: 95 MG/DL
CHOLESTEROL/HDL RATIO: 2.7
CLARITY UR: CLEAR
CO2 SERPL-SCNC: 25 MMOL/L (ref 20–31)
COLOR UR: YELLOW
COMMENT: ABNORMAL
CREAT SERPL-MCNC: 0.9 MG/DL (ref 0.7–1.2)
CREAT UR-MCNC: 153.8 MG/DL (ref 39–259)
ERYTHROCYTE [DISTWIDTH] IN BLOOD BY AUTOMATED COUNT: 13.2 % (ref 11.8–14.4)
GFR SERPL CREATININE-BSD FRML MDRD: >60 ML/MIN/1.73M2
GLUCOSE SERPL-MCNC: 133 MG/DL (ref 70–99)
GLUCOSE UR STRIP-MCNC: ABNORMAL MG/DL
HCT VFR BLD AUTO: 47.2 % (ref 40.7–50.3)
HDLC SERPL-MCNC: 35 MG/DL
HGB BLD-MCNC: 14.9 G/DL (ref 13–17)
HGB UR QL STRIP.AUTO: NEGATIVE
KETONES UR STRIP-MCNC: NEGATIVE MG/DL
LDLC SERPL CALC-MCNC: 40 MG/DL (ref 0–130)
LEUKOCYTE ESTERASE UR QL STRIP: NEGATIVE
MCH RBC QN AUTO: 27 PG (ref 25.2–33.5)
MCHC RBC AUTO-ENTMCNC: 31.6 G/DL (ref 28.4–34.8)
MCV RBC AUTO: 85.7 FL (ref 82.6–102.9)
MICROALBUMIN UR-MCNC: 28 MG/L
MICROALBUMIN/CREAT UR-RTO: 18 MCG/MG CREAT
NITRITE UR QL STRIP: NEGATIVE
NRBC BLD-RTO: 0 PER 100 WBC
PH UR STRIP: 5.5 [PH] (ref 5–8)
PLATELET # BLD AUTO: 267 K/UL (ref 138–453)
PMV BLD AUTO: 10.4 FL (ref 8.1–13.5)
POTASSIUM SERPL-SCNC: 4.3 MMOL/L (ref 3.7–5.3)
PROT SERPL-MCNC: 6.6 G/DL (ref 6.4–8.3)
PROT UR STRIP-MCNC: NEGATIVE MG/DL
RBC # BLD AUTO: 5.51 M/UL (ref 4.21–5.77)
SODIUM SERPL-SCNC: 137 MMOL/L (ref 135–144)
SP GR UR STRIP: 1.02 (ref 1–1.03)
TRIGL SERPL-MCNC: 99 MG/DL
TSH SERPL DL<=0.05 MIU/L-ACNC: 1.53 UIU/ML (ref 0.3–5)
UROBILINOGEN UR STRIP-ACNC: NORMAL EU/DL (ref 0–1)
WBC OTHER # BLD: 7.9 K/UL (ref 3.5–11.3)

## 2023-09-26 NOTE — TELEPHONE ENCOUNTER
Patient needs new order for cgm, he can't use supplier it was sent to.     Order pending, please sign

## 2023-09-27 RX ORDER — ACYCLOVIR 400 MG/1
1 TABLET ORAL DAILY
Qty: 1 EACH | Refills: 0 | Status: SHIPPED | OUTPATIENT
Start: 2023-09-27

## 2023-09-27 RX ORDER — ACYCLOVIR 400 MG/1
1 TABLET ORAL
Qty: 3 EACH | Refills: 5 | Status: SHIPPED | OUTPATIENT
Start: 2023-09-27

## 2023-10-18 ENCOUNTER — OFFICE VISIT (OUTPATIENT)
Dept: INTERNAL MEDICINE CLINIC | Age: 73
End: 2023-10-18
Payer: MEDICARE

## 2023-10-18 VITALS
HEIGHT: 70 IN | WEIGHT: 229.8 LBS | BODY MASS INDEX: 32.9 KG/M2 | HEART RATE: 74 BPM | TEMPERATURE: 97.3 F | RESPIRATION RATE: 18 BRPM | DIASTOLIC BLOOD PRESSURE: 60 MMHG | OXYGEN SATURATION: 99 % | SYSTOLIC BLOOD PRESSURE: 132 MMHG

## 2023-10-18 DIAGNOSIS — Z91.199 NONCOMPLIANCE: ICD-10-CM

## 2023-10-18 DIAGNOSIS — I10 ESSENTIAL HYPERTENSION: ICD-10-CM

## 2023-10-18 DIAGNOSIS — E10.65 TYPE 1 DIABETES MELLITUS WITH HYPERGLYCEMIA (HCC): Primary | ICD-10-CM

## 2023-10-18 DIAGNOSIS — N20.0 RIGHT KIDNEY STONE: ICD-10-CM

## 2023-10-18 DIAGNOSIS — N13.8 BPH WITH OBSTRUCTION/LOWER URINARY TRACT SYMPTOMS: ICD-10-CM

## 2023-10-18 DIAGNOSIS — H91.93 DECREASED HEARING OF BOTH EARS: ICD-10-CM

## 2023-10-18 DIAGNOSIS — I25.10 CORONARY ARTERY DISEASE INVOLVING NATIVE CORONARY ARTERY OF NATIVE HEART WITHOUT ANGINA PECTORIS: ICD-10-CM

## 2023-10-18 DIAGNOSIS — I50.32 CHRONIC DIASTOLIC CONGESTIVE HEART FAILURE (HCC): ICD-10-CM

## 2023-10-18 DIAGNOSIS — E78.5 DYSLIPIDEMIA: ICD-10-CM

## 2023-10-18 DIAGNOSIS — N40.1 BPH WITH OBSTRUCTION/LOWER URINARY TRACT SYMPTOMS: ICD-10-CM

## 2023-10-18 DIAGNOSIS — E66.01 CLASS 2 SEVERE OBESITY DUE TO EXCESS CALORIES WITH SERIOUS COMORBIDITY IN ADULT, UNSPECIFIED BMI (HCC): ICD-10-CM

## 2023-10-18 PROCEDURE — 3046F HEMOGLOBIN A1C LEVEL >9.0%: CPT | Performed by: FAMILY MEDICINE

## 2023-10-18 PROCEDURE — 1123F ACP DISCUSS/DSCN MKR DOCD: CPT | Performed by: FAMILY MEDICINE

## 2023-10-18 PROCEDURE — 3075F SYST BP GE 130 - 139MM HG: CPT | Performed by: FAMILY MEDICINE

## 2023-10-18 PROCEDURE — 99214 OFFICE O/P EST MOD 30 MIN: CPT | Performed by: FAMILY MEDICINE

## 2023-10-18 PROCEDURE — 3078F DIAST BP <80 MM HG: CPT | Performed by: FAMILY MEDICINE

## 2023-10-18 ASSESSMENT — ENCOUNTER SYMPTOMS
ALLERGIC/IMMUNOLOGIC NEGATIVE: 1
EYES NEGATIVE: 1
GASTROINTESTINAL NEGATIVE: 1
RESPIRATORY NEGATIVE: 1

## 2023-10-18 NOTE — PROGRESS NOTES
Subjective:      Patient ID: Kobi Barth is a 81 y.o. male. Diabetes  He presents for his follow-up diabetic visit. He has type 1 diabetes mellitus. The initial diagnosis of diabetes was made 7 years ago. His disease course has been worsening. There are no hypoglycemic associated symptoms. There are no diabetic associated symptoms. There are no hypoglycemic complications. Symptoms are stable. Diabetic complications include heart disease. Risk factors for coronary artery disease include diabetes mellitus, dyslipidemia, male sex, hypertension and sedentary lifestyle. Current diabetic treatment includes insulin injections. He is compliant with treatment most of the time. His weight is fluctuating minimally. He is following a generally unhealthy, high fat/cholesterol, high salt and low fiber diet. Meal planning includes ADA exchanges, avoidance of concentrated sweets, calorie counting and carbohydrate counting. He has had a previous visit with a dietitian. He participates in exercise intermittently. Home blood sugar record trend: Noncompliance with Accu-Cheks. An ACE inhibitor/angiotensin II receptor blocker is being taken. Review of Systems   Constitutional: Negative. HENT: Negative. Eyes: Negative. Respiratory: Negative. Cardiovascular: Negative. Gastrointestinal: Negative. Endocrine: Negative. Musculoskeletal:  Positive for arthralgias. Skin: Negative. Allergic/Immunologic: Negative. Neurological: Negative. Hematological: Negative. Psychiatric/Behavioral:  Positive for decreased concentration. Past family and social history unremarkable. Diagnosis Orders   1. Type 1 diabetes mellitus with hyperglycemia ([facility]) Medication Mgmt (Diabetes - Clinical Pharmacy) - [facility]      2. Right kidney stone        3. Coronary artery disease involving native coronary artery of native heart without angina pectoris        4. Dyslipidemia        5.  Class 2 severe

## 2023-10-30 ENCOUNTER — OFFICE VISIT (OUTPATIENT)
Dept: PODIATRY | Age: 73
End: 2023-10-30
Payer: MEDICARE

## 2023-10-30 VITALS — WEIGHT: 229 LBS | BODY MASS INDEX: 32.78 KG/M2 | HEIGHT: 70 IN

## 2023-10-30 DIAGNOSIS — L85.3 XEROSIS CUTIS: ICD-10-CM

## 2023-10-30 DIAGNOSIS — B35.1 DERMATOPHYTOSIS OF NAIL: ICD-10-CM

## 2023-10-30 DIAGNOSIS — E11.9 TYPE 2 DIABETES MELLITUS WITHOUT COMPLICATION, WITHOUT LONG-TERM CURRENT USE OF INSULIN (HCC): Primary | ICD-10-CM

## 2023-10-30 DIAGNOSIS — M79.605 PAIN IN BOTH LOWER EXTREMITIES: ICD-10-CM

## 2023-10-30 DIAGNOSIS — L60.0 INGROWN NAIL: ICD-10-CM

## 2023-10-30 DIAGNOSIS — I73.9 PERIPHERAL VASCULAR DISORDER (HCC): ICD-10-CM

## 2023-10-30 DIAGNOSIS — M79.604 PAIN IN BOTH LOWER EXTREMITIES: ICD-10-CM

## 2023-10-30 PROCEDURE — 99213 OFFICE O/P EST LOW 20 MIN: CPT | Performed by: PODIATRIST

## 2023-10-30 PROCEDURE — 11721 DEBRIDE NAIL 6 OR MORE: CPT | Performed by: PODIATRIST

## 2023-10-30 PROCEDURE — 3046F HEMOGLOBIN A1C LEVEL >9.0%: CPT | Performed by: PODIATRIST

## 2023-10-30 PROCEDURE — 1123F ACP DISCUSS/DSCN MKR DOCD: CPT | Performed by: PODIATRIST

## 2023-10-30 NOTE — PROGRESS NOTES
deposits seen dorsal foot   Musculoskeletal:     1st MPJ ROM decreased, Bilateral.  Muscle strength 5/5, Bilateral.  Pain present upon palpation of toenails 1-5, Bilateral. decreased medial longitudinal arch, Bilateral.  Ankle ROM decreased,Bilateral.    Dorsally contracted digits present digits 2, Bilateral.     Vascular: DP pulses 1/4 bilateral.  PT pulses 0/4 bilateral.   CFT <5 seconds, Bilateral.  Hair growth absent to the level of the digits, Bilateral.  Edema present, Bilateral.  Varicosities absent, Bilateral. Erythema absent, Bilateral    Neurological: Sensation diminshed to light touch to level of digits, Bilateral.  Protective sensation intact 6/10 sites via 5.07/10g [place] Monofilament, Bilateral.  negative Tinel's, Bilateral.  negative Valleix sign, Bilateral.      Integument: Warm, dry, supple, Bilateral.  Open lesion absent, Bilateral.  Interdigital maceration absent to web spaces 4, Bilateral.  Nails 1-5 left and 1-5 right thickened > 3.0 mm, dystrophic and crumbly, discolored with subungual debris. Fissures absent, Bilateral.   General: AAO x 3 in NAD.     Derm  Toenail Description  Sites of Onychomycosis Involvement (Check affected area)  [x] [x] [x] [x] [x] [x] [x] [x] [x] [x]  5 4 3 2 1 1 2 3 4 5                          Right                                        Left    Thickness  [x] [x] [x] [x] [x] [x] [x] [x] [x] [x]  5 4 3 2 1 1 2 3 4 5                         Right                                        Left    Dystrophic Changes   [x] [x] [x] [x] [x] [x] [x] [x] [x] [x]  5 4 3 2 1 1 2 3 4 5                         Right                                        Left    Color   [x] [x] [x] [x] [x] [x] [x] [x] [x] [x]  5 4 3 2 1 1 2 3 4 5                          Right                                        Left    Incurvation/Ingrowin   [] [] [] [] [] [] [] [] [] []  5 4 3 2 1 1 2 3 4 5                         Right                                        Left    Inflammation/Pain

## 2023-11-03 ENCOUNTER — TELEPHONE (OUTPATIENT)
Dept: PHARMACY | Age: 73
End: 2023-11-03

## 2023-11-03 NOTE — TELEPHONE ENCOUNTER
Received new referral for Diabetes Medication Management from Dr. João Jacobson. Called patient and  he states he is currently with his wife at [facility] awaiting some test results and is unable to schedule at this time. He requests a call back at a later date. I also gave him our clinic number to reach out when available .

## 2023-11-08 ENCOUNTER — TELEPHONE (OUTPATIENT)
Dept: PHARMACY | Age: 73
End: 2023-11-08

## 2023-11-08 NOTE — TELEPHONE ENCOUNTER
Pt left VM on Fri 11/3 requesting appt (referred for diabetes). Called pt to schedule appt but had to leave message.

## 2023-11-09 ENCOUNTER — TELEPHONE (OUTPATIENT)
Dept: PHARMACY | Facility: CLINIC | Age: 73
End: 2023-11-09

## 2023-11-09 NOTE — TELEPHONE ENCOUNTER
[facility] CLINICAL PHARMACY: ADHERENCE REVIEW  Identified care gap per [place]: fills at [facility]: ACE/ARB and Diabetes adherence    Patient also appears to be prescribed: ACE/ARB and Diabetes    ASSESSMENT    ACE/ARB ADHERENCE    Insurance Records claims through 23 (Prior Year [address] = not reported; [address] = 79%; Potential Fail Date: 23):   LISINOPRIL   TAB 5MG last filled on 23 for 90 day supply. Next refill due: 23    Prescribed si tablet/capsule daily    Per Reconcile Dispense History: last filled on 23 for 90 day supply. Per [facility]: last filled on 23 for 90 day supply and READY to . Can be refilled on 23    BP Readings from Last 3 Encounters:   10/18/23 132/60   23 122/60   23 114/70     Estimated Creatinine Clearance: 91 mL/min (based on SCr of 0.9 mg/dL). Lab Results   Component Value Date    CREATININE 0.9 2023     Lab Results   Component Value Date    K 4.3 2023     DIABETES ADHERENCE    Insurance Records claims through 23 (Prior Year [address] = not reported; [address] = 98%; Potential Fail Date: 24): METFORMIN    TAB 1000MG last filled on 23 for 100 day supply. Next refill due: 23    Prescribed si tablet/capsule daily    Per Reconcile Dispense History: last filled on 23 for 100 day supply. Per [facility]: will get 100 day supply ready to  since past due. Lab Results   Component Value Date    LABA1C 11.0 2023    LABA1C 9.7 (H) 2023    LABA1C 6.9 (H) 2022     NOTE: A1c >9%    PLAN  The following are interventions that have been identified:   Patient overdue refilling METFORMIN    TAB 1000MG and LISINOPRIL   TAB 5MG  and active on home medication list.     Attempting to reach patient to review. Left message asking for return call. Letter sent to patient.     Recent Visits  Date Type Provider Dept   10/18/23 Office Visit Cyndy Saldana MD [facility]

## 2023-11-21 ENCOUNTER — TELEPHONE (OUTPATIENT)
Dept: PHARMACY | Age: 73
End: 2023-11-21

## 2023-12-27 ENCOUNTER — TELEPHONE (OUTPATIENT)
Dept: PHARMACY | Facility: CLINIC | Age: 73
End: 2023-12-27

## 2023-12-27 NOTE — TELEPHONE ENCOUNTER
[facility] CLINICAL PHARMACY: ADHERENCE REVIEW  Identified care gap per [employer]: fills at [facility]: Statin adherence    Patient also appears to be prescribed: ACE/ARB and Diabetes    ASSESSMENT    ACE/ARB ADHERENCE    Insurance Records claims through 23 (Prior Year  [address] = 81% - PASSED; YTD PDC = 82% - PASSED)    BP Readings from Last 3 Encounters:   10/18/23 132/60   23 122/60   23 114/70     Estimated Creatinine Clearance: 91 mL/min (based on SCr of 0.9 mg/dL). Lab Results   Component Value Date    CREATININE 0.9 2023     Lab Results   Component Value Date    K 4.3 2023     DIABETES ADHERENCE    Insurance Records claims through 23 (Prior Year  [address] = 95% - PASSED; YTD PDC = 98% - PASSED)    Lab Results   Component Value Date    LABA1C 11.0 2023    LABA1C 9.7 (H) 2023    LABA1C 6.9 (H) 20[address] Records claims through 23 (Prior Year PDC = 76% - FAILED; YTD PDC = 83%; Potential Fail Date: 23):   ATORVASTATIN TAB 40MG last filled on 23 for 90 day supply. Next refill due: 23    Prescribed si tablet/capsule daily    Per Reconcile Dispense History and Insurer Portal: last filled on 23 for 90 day supply. Per [address]: READY to .  $0 cost/copay. Lab Results   Component Value Date    CHOL 95 2023    TRIG 99 2023    HDL 35 (L) 2023    LDLCHOLESTEROL 40 2023     ALT   Date Value Ref Range Status   2023 12 5 - 41 U/L Final     AST   Date Value Ref Range Status   2023 13 <40 U/L Final     The ASCVD Risk score (Allyn WALTER, et al., 2019) failed to calculate for the following reasons: The valid total cholesterol range is 130 to 320 mg/dL     PLAN  The following are interventions that have been identified:   Refill/s of atorvastatin 40mg daily READY to  at patient's [address]  A1c 11.0 - does not appear to have filled Lantus?  And did not keep/attend

## 2024-01-03 ENCOUNTER — OFFICE VISIT (OUTPATIENT)
Dept: PODIATRY | Age: 74
End: 2024-01-03
Payer: MEDICARE

## 2024-01-03 VITALS — HEIGHT: 70 IN | WEIGHT: 229 LBS | BODY MASS INDEX: 32.78 KG/M2

## 2024-01-03 DIAGNOSIS — I73.9 PERIPHERAL VASCULAR DISORDER (HCC): ICD-10-CM

## 2024-01-03 DIAGNOSIS — M79.604 PAIN IN BOTH LOWER EXTREMITIES: ICD-10-CM

## 2024-01-03 DIAGNOSIS — B35.1 DERMATOPHYTOSIS OF NAIL: ICD-10-CM

## 2024-01-03 DIAGNOSIS — M79.605 PAIN IN BOTH LOWER EXTREMITIES: ICD-10-CM

## 2024-01-03 DIAGNOSIS — L60.0 INGROWN NAIL: ICD-10-CM

## 2024-01-03 DIAGNOSIS — L85.3 XEROSIS CUTIS: ICD-10-CM

## 2024-01-03 DIAGNOSIS — E11.9 TYPE 2 DIABETES MELLITUS WITHOUT COMPLICATION, WITHOUT LONG-TERM CURRENT USE OF INSULIN (HCC): Primary | ICD-10-CM

## 2024-01-03 PROCEDURE — 11721 DEBRIDE NAIL 6 OR MORE: CPT | Performed by: PODIATRIST

## 2024-01-03 PROCEDURE — 1123F ACP DISCUSS/DSCN MKR DOCD: CPT | Performed by: PODIATRIST

## 2024-01-03 PROCEDURE — 99213 OFFICE O/P EST LOW 20 MIN: CPT | Performed by: PODIATRIST

## 2024-01-03 NOTE — PROGRESS NOTES
patients arrival and with the patient today.    Previous patient encounter was reviewed.  Encounters from the patients other medical providers were reviewed and noted.   I personally interpreted the imaging and labs and discussed the results with the patient. Time was spent educating the patient and their families/caregivers on proper care of the feet and ankles.  All the above diagnosis were addressed at todays visit and all questions were answered.  A total of 20 minutes was spent with this patients encounter which included charting after the patients visit    Pt will follow up in 9 weeks or sooner if any problems arise. Diagnosis was discussed with the pt and all of their questions were answered in detail. Proper foot hygiene and care was discussed with the pt. Informed patient on proper diabetic foot care and importance of tight glycemic control.  Patient to check feet daily and contact the office with any questions/problems/concerns.   Other comorbidity noted and will be managed by PCP.  1/3/2024    Electronically signed by Amanda Mccabe DPM on 1/3/2024 at 8:16 AM  1/3/2024

## 2024-01-16 NOTE — TELEPHONE ENCOUNTER
Per chart review, patient declines visit due to caring for his wife at this time. Referral for medication management remains open and clinic welcomes scheduling at a future date/time that is agreeable with the patient.

## 2024-01-18 ENCOUNTER — CARE COORDINATION (OUTPATIENT)
Dept: CARE COORDINATION | Age: 74
End: 2024-01-18

## 2024-01-18 ENCOUNTER — OFFICE VISIT (OUTPATIENT)
Dept: INTERNAL MEDICINE CLINIC | Age: 74
End: 2024-01-18

## 2024-01-18 VITALS
HEART RATE: 50 BPM | WEIGHT: 220 LBS | BODY MASS INDEX: 31.5 KG/M2 | SYSTOLIC BLOOD PRESSURE: 130 MMHG | OXYGEN SATURATION: 96 % | DIASTOLIC BLOOD PRESSURE: 76 MMHG | TEMPERATURE: 98.1 F | HEIGHT: 70 IN | RESPIRATION RATE: 10 BRPM

## 2024-01-18 DIAGNOSIS — Z91.199 NON-COMPLIANCE: ICD-10-CM

## 2024-01-18 DIAGNOSIS — Z95.1 H/O TWO VESSEL CORONARY ARTERY BYPASS GRAFT: ICD-10-CM

## 2024-01-18 DIAGNOSIS — Z12.11 COLON CANCER SCREENING: ICD-10-CM

## 2024-01-18 DIAGNOSIS — E66.01 CLASS 2 SEVERE OBESITY DUE TO EXCESS CALORIES WITH SERIOUS COMORBIDITY IN ADULT, UNSPECIFIED BMI (HCC): ICD-10-CM

## 2024-01-18 DIAGNOSIS — I25.10 CORONARY ARTERY DISEASE INVOLVING NATIVE CORONARY ARTERY OF NATIVE HEART WITHOUT ANGINA PECTORIS: ICD-10-CM

## 2024-01-18 DIAGNOSIS — Z23 NEED FOR INFLUENZA VACCINATION: ICD-10-CM

## 2024-01-18 DIAGNOSIS — Z00.00 MEDICARE ANNUAL WELLNESS VISIT, SUBSEQUENT: Primary | ICD-10-CM

## 2024-01-18 DIAGNOSIS — H91.93 DECREASED HEARING OF BOTH EARS: ICD-10-CM

## 2024-01-18 DIAGNOSIS — E10.65 TYPE 1 DIABETES MELLITUS WITH HYPERGLYCEMIA (HCC): ICD-10-CM

## 2024-01-18 DIAGNOSIS — N13.8 BPH WITH OBSTRUCTION/LOWER URINARY TRACT SYMPTOMS: ICD-10-CM

## 2024-01-18 DIAGNOSIS — E78.5 DYSLIPIDEMIA: ICD-10-CM

## 2024-01-18 DIAGNOSIS — R41.3 MEMORY DEFICIT: ICD-10-CM

## 2024-01-18 DIAGNOSIS — I10 ESSENTIAL HYPERTENSION: ICD-10-CM

## 2024-01-18 DIAGNOSIS — N40.1 BPH WITH OBSTRUCTION/LOWER URINARY TRACT SYMPTOMS: ICD-10-CM

## 2024-01-18 DIAGNOSIS — N20.0 RIGHT KIDNEY STONE: ICD-10-CM

## 2024-01-18 DIAGNOSIS — I50.32 CHRONIC DIASTOLIC CONGESTIVE HEART FAILURE (HCC): ICD-10-CM

## 2024-01-18 LAB — HBA1C MFR BLD: 7.8 %

## 2024-01-18 SDOH — ECONOMIC STABILITY: TRANSPORTATION INSECURITY
IN THE PAST 12 MONTHS, HAS THE LACK OF TRANSPORTATION KEPT YOU FROM MEDICAL APPOINTMENTS OR FROM GETTING MEDICATIONS?: NO

## 2024-01-18 SDOH — ECONOMIC STABILITY: FOOD INSECURITY: WITHIN THE PAST 12 MONTHS, YOU WORRIED THAT YOUR FOOD WOULD RUN OUT BEFORE YOU GOT MONEY TO BUY MORE.: NEVER TRUE

## 2024-01-18 SDOH — ECONOMIC STABILITY: TRANSPORTATION INSECURITY
IN THE PAST 12 MONTHS, HAS LACK OF TRANSPORTATION KEPT YOU FROM MEETINGS, WORK, OR FROM GETTING THINGS NEEDED FOR DAILY LIVING?: NO

## 2024-01-18 SDOH — ECONOMIC STABILITY: FOOD INSECURITY: WITHIN THE PAST 12 MONTHS, THE FOOD YOU BOUGHT JUST DIDN'T LAST AND YOU DIDN'T HAVE MONEY TO GET MORE.: NEVER TRUE

## 2024-01-18 ASSESSMENT — PATIENT HEALTH QUESTIONNAIRE - PHQ9
SUM OF ALL RESPONSES TO PHQ QUESTIONS 1-9: 0
SUM OF ALL RESPONSES TO PHQ9 QUESTIONS 1 & 2: 0
SUM OF ALL RESPONSES TO PHQ QUESTIONS 1-9: 0
SUM OF ALL RESPONSES TO PHQ QUESTIONS 1-9: 0
1. LITTLE INTEREST OR PLEASURE IN DOING THINGS: 0
2. FEELING DOWN, DEPRESSED OR HOPELESS: 0
SUM OF ALL RESPONSES TO PHQ QUESTIONS 1-9: 0

## 2024-01-18 ASSESSMENT — LIFESTYLE VARIABLES
HOW MANY STANDARD DRINKS CONTAINING ALCOHOL DO YOU HAVE ON A TYPICAL DAY: PATIENT DOES NOT DRINK
HOW OFTEN DO YOU HAVE A DRINK CONTAINING ALCOHOL: NEVER

## 2024-01-18 NOTE — PROGRESS NOTES
Medicare Annual Wellness Visit    Anthony Jacobson Jr. is here for Medicare AWV  Vitals  Afebrile hemodynamically stable  Systemic exam  HEENT unremarkable  Neck unremarkable  Lungs bilateral CTA  CVS S1-S2 regular rate and rhythm  Abdomen soft discomfort benign to palpation normoactive bowel sound  CNS patient expressed signs of early dementia.  Neurologically intact  Lower extremity no edema no calf tenderness  Skin no tenting no lesions  Assessment & Plan   Medicare annual wellness visit, subsequent  Type 1 diabetes mellitus with hyperglycemia (HCC)  Right kidney stone  Chronic diastolic congestive heart failure (HCC)  Coronary artery disease involving native coronary artery of native heart without angina pectoris  Dyslipidemia  Class 2 severe obesity due to excess calories with serious comorbidity in adult, unspecified BMI (HCC)  Essential hypertension  BPH with obstruction/lower urinary tract symptoms  Decreased hearing of both ears  H/O two vessel coronary artery bypass graft  Non-compliance  Colon cancer screening  -     Fecal DNA Colorectal cancer screening (Cologuard)  Memory deficit  -     Carlos Morrison MD, Neurology, [facility]    73-year-old male is presented requesting Medicare annual exam.  He is afebrile hemodynamically stable  He denies tobacco, excessive alcohol or illicit drug use  Patient states that he lives with his son who is a   He continues to drive taxicab  He has been noncompliant with insulin.  Last evaluation on 7/2023 he had A1c of 11.  He states that he is compliant with metformin and unsure about Farxiga.  A1c done again is 7.8.  He is not monitoring his blood sugar.  Review of the record shows that he was ordered CGM.  Hemodynamically stable with random blood pressure equal less than 130/80  Hyperlipidemia on statin that he is tolerating well  Monofilament testing is unremarkable.  He is counseled on diabetic footcare  Hearing impairment.  He will be scheduled

## 2024-01-18 NOTE — PATIENT INSTRUCTIONS
think you may have a problem with alcohol or drug use, talk to your doctor.   Medicines    Take your medicines exactly as prescribed. Call your doctor if you think you are having a problem with your medicine.     If your doctor recommends aspirin, take the amount directed each day. Make sure you take aspirin and not another kind of pain reliever, such as acetaminophen (Tylenol).   When should you call for help?   Call 911 if you have symptoms of a heart attack. These may include:    Chest pain or pressure, or a strange feeling in the chest.     Sweating.     Shortness of breath.     Pain, pressure, or a strange feeling in the back, neck, jaw, or upper belly or in one or both shoulders or arms.     Lightheadedness or sudden weakness.     A fast or irregular heartbeat.   After you call 911, the  may tell you to chew 1 adult-strength or 2 to 4 low-dose aspirin. Wait for an ambulance. Do not try to drive yourself.  Watch closely for changes in your health, and be sure to contact your doctor if you have any problems.  Where can you learn more?  Go to https://www.Chartboost.net/patientEd and enter F075 to learn more about \"A Healthy Heart: Care Instructions.\"  Current as of: June 25, 2023               Content Version: 13.9  © 2006-2023 WebMD.   Care instructions adapted under license by [employer]. If you have questions about a medical condition or this instruction, always ask your healthcare professional. WebMD disclaims any warranty or liability for your use of this information.      Personalized Preventive Plan for Anthony Jacobson JrEliseo - 1/18/2024  Medicare offers a range of preventive health benefits. Some of the tests and screenings are paid in full while other may be subject to a deductible, co-insurance, and/or copay.    Some of these benefits include a comprehensive review of your medical history including lifestyle, illnesses that may run in your family, and

## 2024-01-19 DIAGNOSIS — E10.65 TYPE 1 DIABETES MELLITUS WITH HYPERGLYCEMIA (HCC): Primary | ICD-10-CM

## 2024-01-19 RX ORDER — BLOOD-GLUCOSE,RECEIVER,CONT
1 EACH MISCELLANEOUS DAILY
Qty: 1 EACH | Refills: 0 | Status: SHIPPED | OUTPATIENT
Start: 2024-01-19

## 2024-01-19 RX ORDER — BLOOD-GLUCOSE SENSOR
1 EACH MISCELLANEOUS
Qty: 3 EACH | Refills: 5 | Status: SHIPPED | OUTPATIENT
Start: 2024-01-19

## 2024-01-25 ENCOUNTER — CARE COORDINATION (OUTPATIENT)
Dept: CARE COORDINATION | Age: 74
End: 2024-01-25

## 2024-01-25 NOTE — CARE COORDINATION
Ambulatory Care Coordination Note  2024    Patient Current Location:  Home: [address] [place] [id number]     ACM contacted the patient by telephone. Verified name and  with patient as identifiers. Provided introduction to self, and explanation of the ACM role.     Challenges to be reviewed by the provider   Additional needs identified to be addressed with provider: No  none               Method of communication with provider: none.    ACM: Chrissy Otto RN    ACM spoke with wife Samantha for CC needs and follow up with meeting with son about health and well-being, decision for auditory specialist vs neurologist for memory concerns.  DM, CHF and HTN education reviewed      ACM Plan  Will follow up with outreach next week for updates with meeting with son and decision about navigating the concern for memory issues  Chronic conditions      Offered patient enrollment in the Remote Patient Monitoring (RPM) program for in-home monitoring: Patient declined.    Lab Results       None            Care Coordination Interventions    Referral from Primary Care Provider: No  Suggested Interventions and Community Resources  Transportation Support: In Process (Comment: DM, CHF and HTN management)          Goals Addressed                   This Visit's Progress     Conditions and Symptoms   Improving     I will schedule office visits, as directed by my provider.  I will keep my appointment or reschedule if I have to cancel.  I will notify my provider of any barriers to my plan of care.  I will follow my Zone Management tool to seek urgent or emergent care.  I will notify my provider of any symptoms that indicate a worsening of my condition.    Barriers: overwhelmed by complexity of regimen  Plan for overcoming my barriers: Willing to work with chronic conditions education  Confidence: 8/10  Anticipated Goal Completion Date: 3/20/2024                Future Appointments   Date Time Provider Department Center   3/27/2024

## 2024-02-01 ENCOUNTER — CARE COORDINATION (OUTPATIENT)
Dept: CARE COORDINATION | Age: 74
End: 2024-02-01

## 2024-02-01 ENCOUNTER — TELEPHONE (OUTPATIENT)
Dept: INTERNAL MEDICINE CLINIC | Age: 74
End: 2024-02-01

## 2024-02-01 SDOH — ECONOMIC STABILITY: INCOME INSECURITY: IN THE LAST 12 MONTHS, WAS THERE A TIME WHEN YOU WERE NOT ABLE TO PAY THE MORTGAGE OR RENT ON TIME?: NO

## 2024-02-01 SDOH — ECONOMIC STABILITY: HOUSING INSECURITY: IN THE LAST 12 MONTHS, HOW MANY PLACES HAVE YOU LIVED?: 1

## 2024-02-01 ASSESSMENT — SOCIAL DETERMINANTS OF HEALTH (SDOH)
HOW OFTEN DO YOU ATTENT MEETINGS OF THE CLUB OR ORGANIZATION YOU BELONG TO?: 1 TO 4 TIMES PER YEAR
IN A TYPICAL WEEK, HOW MANY TIMES DO YOU TALK ON THE PHONE WITH FAMILY, FRIENDS, OR NEIGHBORS?: MORE THAN THREE TIMES A WEEK
HOW OFTEN DO YOU ATTEND CHURCH OR RELIGIOUS SERVICES?: 1 TO 4 TIMES PER YEAR
HOW OFTEN DO YOU GET TOGETHER WITH FRIENDS OR RELATIVES?: MORE THAN THREE TIMES A WEEK
DO YOU BELONG TO ANY CLUBS OR ORGANIZATIONS SUCH AS CHURCH GROUPS UNIONS, FRATERNAL OR ATHLETIC GROUPS, OR SCHOOL GROUPS?: YES

## 2024-02-01 NOTE — TELEPHONE ENCOUNTER
Patient is asking for his dex com and any other supplies to be sent to [facility] [address]?    Please advise

## 2024-02-01 NOTE — CARE COORDINATION
Ambulatory Care Coordination Note  2024    Patient Current Location:  Home: [address] [place] [id number]     ACM contacted the patient by telephone. Verified name and  with patient as identifiers. Provided introduction to self, and explanation of the ACM role.     Challenges to be reviewed by the provider   Additional needs identified to be addressed with provider: No  none               Method of communication with provider: none.    ACM: Chrissy Otto RN    ACM spoke with spouse Samantha for CC needs and updates with DM, CHF and HTN management   Verbalizes that she and son has not had meeting regarding need for neurology specialist or just needs hearing rechecked  Reviewed recent A1C and is at 7.8 as of 24, recent A1C 2023 was 11.0  Does not check BS daily, nor take insulin, does take oral therapy at this time  Reviewed chronic conditions educations    ACM Plan  Will follow up with outreach next week for updates with neurology need, chronic conditions and well-being.  Offered patient enrollment in the Remote Patient Monitoring (RPM) program for in-home monitoring: Patient declined.    Lab Results       None            Care Coordination Interventions    Referral from Primary Care Provider: No  Suggested Interventions and Community Resources  Transportation Support:  (Comment: DM, CHF and HTN management)  Zone Management Tools: In Process (Comment: DM, CHF and HTN management)          Goals Addressed                   This Visit's Progress     Conditions and Symptoms   Improving     I will schedule office visits, as directed by my provider.  I will keep my appointment or reschedule if I have to cancel.  I will notify my provider of any barriers to my plan of care.  I will follow my Zone Management tool to seek urgent or emergent care.  I will notify my provider of any symptoms that indicate a worsening of my condition.    Barriers: overwhelmed by complexity of regimen  Plan for overcoming my

## 2024-02-05 ENCOUNTER — CARE COORDINATION (OUTPATIENT)
Dept: CARE COORDINATION | Age: 74
End: 2024-02-05

## 2024-02-05 NOTE — CARE COORDINATION
ACM attempted outreach for CC needs, DM and CHF management, well-being  No answer and LVM with call back information

## 2024-02-13 ENCOUNTER — CARE COORDINATION (OUTPATIENT)
Dept: CARE COORDINATION | Age: 74
End: 2024-02-13

## 2024-02-13 NOTE — CARE COORDINATION
Ambulatory Care Coordination Note  2024    Patient Current Location:  Home: [address] [place] [id number]     ACM contacted the patient and spouse/partner by telephone. Verified name and  with spouse/partner as identifiers. Provided introduction to self, and explanation of the ACM role.     Challenges to be reviewed by the provider   Additional needs identified to be addressed with provider: No  none               Method of communication with provider: none.    ACM: Chrissy Otto RN    ACM spoke with Samantha, spouse for CC needs, updates with DM, CHF and HTN management. Well-being  Verbalizes that he has not received dexicon as of recently, did have to have order sent to another DME distributor, awaiting response  DM, CHF and HTN education reviewed  ACM will review for Dexicon needs and any other assistance needed    ACM Plan  Will follow up with outreach next week for updates with chronic conditions. Dexicon order     Offered patient enrollment in the Remote Patient Monitoring (RPM) program for in-home monitoring: Patient declined.    Lab Results       None            Care Coordination Interventions    Referral from Primary Care Provider: No  Suggested Interventions and Community Resources  Transportation Support:  (Comment: DM, CHF and HTN management)  Zone Management Tools: In Process (Comment: DM, CHF and HTN management)          Goals Addressed                   This Visit's Progress     Conditions and Symptoms   Improving     I will schedule office visits, as directed by my provider.  I will keep my appointment or reschedule if I have to cancel.  I will notify my provider of any barriers to my plan of care.  I will follow my Zone Management tool to seek urgent or emergent care.  I will notify my provider of any symptoms that indicate a worsening of my condition.    Barriers: overwhelmed by complexity of regimen  Plan for overcoming my barriers: Willing to work with chronic conditions

## 2024-02-23 ENCOUNTER — CARE COORDINATION (OUTPATIENT)
Dept: CARE COORDINATION | Age: 74
End: 2024-02-23

## 2024-02-24 DIAGNOSIS — E11.9 TYPE 2 DIABETES MELLITUS WITHOUT COMPLICATION, WITHOUT LONG-TERM CURRENT USE OF INSULIN (HCC): ICD-10-CM

## 2024-02-26 NOTE — TELEPHONE ENCOUNTER
Anthony Jacobson Jr. is calling to request a refill on the following medication(s):    Medication Request:  Requested Prescriptions     Pending Prescriptions Disp Refills    metFORMIN (GLUCOPHAGE) 1000 MG tablet [Pharmacy Med Name: metFORMIN HCL 1,000 MG TABLET] 200 tablet 1     Sig: TAKE 1 TABLET BY MOUTH TWICE A DAY WITH A MEAL       Last Visit Date (If Applicable):  1/18/2024    Next Visit Date:    4/18/2024      Last refill 8/3/23. Prescription pending.

## 2024-03-04 ENCOUNTER — CARE COORDINATION (OUTPATIENT)
Dept: CARE COORDINATION | Age: 74
End: 2024-03-04

## 2024-03-04 NOTE — CARE COORDINATION
Ambulatory Care Coordination Note  3/4/2024    Patient Current Location:  Home: [address] [place] [id number]     ACM contacted the spouse/partner by telephone. Verified name and  with spouse/partner as identifiers. Provided introduction to self, and explanation of the ACM role.     Challenges to be reviewed by the provider   Additional needs identified to be addressed with provider: No  none               Method of communication with provider: none.    ACM: Chrissy Otto RN    ACM spoke with wife Samantha for CC needs and updates with DM, CHF and HTN management  No new concerns at this time, still not checking BS readings currently  CHF, DM and HTN education reviewed  Has not met with neurology as of yet but is scheduled for OV on 2024 as NP  Upcoming visit with Podiatry scheduled on 3/27  PCP follow up 24    ACM Plan  Will follow up with outreach for updates with chronic conditions, well-being, memory vs auditory deficits.       Offered patient enrollment in the Remote Patient Monitoring (RPM) program for in-home monitoring: Patient declined.    Lab Results       None            Care Coordination Interventions    Referral from Primary Care Provider: No  Suggested Interventions and Community Resources  Transportation Support:  (Comment: DM, CHF and HTN management)  Zone Management Tools: In Process (Comment: DM, CHF and HTN management)          Goals Addressed                   This Visit's Progress     Conditions and Symptoms   Improving     I will schedule office visits, as directed by my provider.  I will keep my appointment or reschedule if I have to cancel.  I will notify my provider of any barriers to my plan of care.  I will follow my Zone Management tool to seek urgent or emergent care.  I will notify my provider of any symptoms that indicate a worsening of my condition.    Barriers: overwhelmed by complexity of regimen  Plan for overcoming my barriers: Willing to work with chronic conditions

## 2024-03-13 ENCOUNTER — CARE COORDINATION (OUTPATIENT)
Dept: CARE COORDINATION | Age: 74
End: 2024-03-13

## 2024-03-19 ENCOUNTER — TELEPHONE (OUTPATIENT)
Dept: INTERNAL MEDICINE CLINIC | Age: 74
End: 2024-03-19

## 2024-03-19 NOTE — TELEPHONE ENCOUNTER
----- Message from Sangeetha Fernandez sent at 3/19/2024  4:21 PM EDT -----  Subject: Referral Request    Reason for referral request? Endocrinologist  Provider patient wants to be referred to(if known):     Provider Phone Number(if known):    Additional Information for Provider? Anthony would like a referral for an   Endocrinologist for his DM and he can be reached at [phone] ok to   leave a message or text  ---------------------------------------------------------------------------  --------------  CALL BACK INFO    [phone]; OK to leave message on voicemail  ---------------------------------------------------------------------------  --------------

## 2024-03-20 ENCOUNTER — CARE COORDINATION (OUTPATIENT)
Dept: CARE COORDINATION | Age: 74
End: 2024-03-20

## 2024-03-20 NOTE — TELEPHONE ENCOUNTER
I am not sure the reason for referral.  Review of the record shows that his diabetes is progressively improving and his A1c dropped from 11 down to 7.8 as of January 2024.  I suggest that he continue with current regimen of medications, keep daily Accu-Chek log and bring to the next office visit for further review

## 2024-03-21 NOTE — CARE COORDINATION
ACM attempted outreach for CC needs, DM, CHF and HTN management, well-being  NO answer and unable to LVM at this time    Will follow up with outreach next week.

## 2024-03-27 ENCOUNTER — OFFICE VISIT (OUTPATIENT)
Dept: PODIATRY | Age: 74
End: 2024-03-27

## 2024-03-27 VITALS — BODY MASS INDEX: 31.5 KG/M2 | WEIGHT: 220 LBS | HEIGHT: 70 IN

## 2024-03-27 DIAGNOSIS — I73.9 PERIPHERAL VASCULAR DISORDER (HCC): ICD-10-CM

## 2024-03-27 DIAGNOSIS — L60.0 INGROWN NAIL: ICD-10-CM

## 2024-03-27 DIAGNOSIS — B35.1 DERMATOPHYTOSIS OF NAIL: ICD-10-CM

## 2024-03-27 DIAGNOSIS — R60.0 EDEMA OF LOWER EXTREMITY: ICD-10-CM

## 2024-03-27 DIAGNOSIS — M79.605 PAIN IN BOTH LOWER EXTREMITIES: ICD-10-CM

## 2024-03-27 DIAGNOSIS — M79.604 PAIN IN BOTH LOWER EXTREMITIES: ICD-10-CM

## 2024-03-27 DIAGNOSIS — L85.3 XEROSIS CUTIS: ICD-10-CM

## 2024-03-27 DIAGNOSIS — E11.9 TYPE 2 DIABETES MELLITUS WITHOUT COMPLICATION, WITHOUT LONG-TERM CURRENT USE OF INSULIN (HCC): Primary | ICD-10-CM

## 2024-03-27 NOTE — PROGRESS NOTES
[x] [x] [x] [x] [x] [x] [x] [x] [x] [x]  5 4 3 2 1 1 2 3 4 5                         Right                                        Left    Color   [x] [x] [x] [x] [x] [x] [x] [x] [x] [x]  5 4 3 2 1 1 2 3 4 5                          Right                                        Left    Incurvation/Ingrowin   [] [] [] [] [] [] [] [] [] []  5 4 3 2 1 1 2 3 4 5                         Right                                        Left    Inflammation/Pain   [x] [x] [x] [x] [x] [x] [x] [x] [x] [x]  5 4 3 2 1 1 2 3 4 5                         Right                                        Left        Visual inspection:  Deformity: hammertoe deformity americo feet  amputation: absent  Skin lesions: absent  Edema: right- 2+ pitting edema, left- 2+ pitting edema    Sensory exam:  Monofilament sensation: abnormal - 6/10 via SW 5.07/10g monofilament to the plantar foot bilateral feet    Pulses: abnormal - 1/4 dorsalis pedis pulse and 0/4 Posterior tibial pulse,   Pinprick: Impaired  Proprioception: Impaired  Vibration (128 Hz): Impaired       DM with PVD       [x]Yes    []No      Assessment:  73 y.o. male with:   Diagnosis Orders   1. Type 2 diabetes mellitus without complication, without long-term current use of insulin ([facility])  [address] - DC DEBRIDEMENT OF NAILS, 6 OR MORE    HM DIABETES FOOT EXAM      2. Dermatophytosis of nail  46450 - DC DEBRIDEMENT OF NAILS, 6 OR MORE    HM DIABETES FOOT EXAM      3. Pain in both lower extremities  84425 - DC DEBRIDEMENT OF NAILS, 6 OR MORE    HM DIABETES FOOT EXAM      4. Peripheral vascular disorder (HCC)  75615 - DC DEBRIDEMENT OF NAILS, 6 OR MORE    HM DIABETES FOOT EXAM      5. Ingrown nail  22828 - DC DEBRIDEMENT OF NAILS, 6 OR MORE    HM DIABETES FOOT EXAM      6. Xerosis cutis  06196 - DC DEBRIDEMENT OF NAILS, 6 OR MORE    HM DIABETES FOOT EXAM      7. Edema of lower extremity  60146 - DC DEBRIDEMENT OF NAILS, 6 OR MORE    HM DIABETES FOOT EXAM            Q7   []Yes    []No                Q8

## 2024-04-05 ENCOUNTER — CARE COORDINATION (OUTPATIENT)
Dept: CARE COORDINATION | Age: 74
End: 2024-04-05

## 2024-04-05 NOTE — TELEPHONE ENCOUNTER
Anthony Jacobson Jr. is calling to request a refill on the following medication(s):    Medication Request:  Requested Prescriptions     Pending Prescriptions Disp Refills    lisinopril (PRINIVIL;ZESTRIL) 5 MG tablet 90 tablet 1     Sig: lisinopril 5 mg tablet   Take 1 tablet every day by oral route.       Last Visit Date (If Applicable):  1/18/2024    Next Visit Date:    4/18/2024

## 2024-04-06 RX ORDER — LISINOPRIL 5 MG/1
TABLET ORAL
Qty: 90 TABLET | Refills: 1 | Status: SHIPPED | OUTPATIENT
Start: 2024-04-06

## 2024-04-08 NOTE — CARE COORDINATION
ACM attempted outreach for CC needs, CHF and DM management, wellbeing  No answer and unable to LVM at this time    Will follow up with outreach next week for CC updates

## 2024-04-10 ENCOUNTER — CARE COORDINATION (OUTPATIENT)
Dept: CARE COORDINATION | Age: 74
End: 2024-04-10

## 2024-04-10 NOTE — CARE COORDINATION
ACM attempted outreach for CC needs, DM and CHF management, well-being  No answer and unable to LVM at this time    Will follow up with outreach next week for updates with CC needs

## 2024-04-18 ENCOUNTER — OFFICE VISIT (OUTPATIENT)
Dept: INTERNAL MEDICINE CLINIC | Age: 74
End: 2024-04-18

## 2024-04-18 VITALS
HEIGHT: 70 IN | RESPIRATION RATE: 16 BRPM | HEART RATE: 51 BPM | SYSTOLIC BLOOD PRESSURE: 144 MMHG | TEMPERATURE: 97.3 F | OXYGEN SATURATION: 99 % | BODY MASS INDEX: 30.84 KG/M2 | DIASTOLIC BLOOD PRESSURE: 80 MMHG | WEIGHT: 215.4 LBS

## 2024-04-18 DIAGNOSIS — E78.5 DYSLIPIDEMIA: ICD-10-CM

## 2024-04-18 DIAGNOSIS — N13.8 BPH WITH OBSTRUCTION/LOWER URINARY TRACT SYMPTOMS: ICD-10-CM

## 2024-04-18 DIAGNOSIS — N40.1 BPH WITH OBSTRUCTION/LOWER URINARY TRACT SYMPTOMS: ICD-10-CM

## 2024-04-18 DIAGNOSIS — E10.65 TYPE 1 DIABETES MELLITUS WITH HYPERGLYCEMIA (HCC): ICD-10-CM

## 2024-04-18 DIAGNOSIS — I10 ESSENTIAL HYPERTENSION: ICD-10-CM

## 2024-04-18 DIAGNOSIS — Z91.199 NON-COMPLIANCE: ICD-10-CM

## 2024-04-18 DIAGNOSIS — I25.10 CORONARY ARTERY DISEASE INVOLVING NATIVE CORONARY ARTERY OF NATIVE HEART WITHOUT ANGINA PECTORIS: ICD-10-CM

## 2024-04-18 DIAGNOSIS — H91.93 DECREASED HEARING OF BOTH EARS: ICD-10-CM

## 2024-04-18 DIAGNOSIS — Z12.11 COLON CANCER SCREENING: ICD-10-CM

## 2024-04-18 DIAGNOSIS — E66.01 CLASS 2 SEVERE OBESITY DUE TO EXCESS CALORIES WITH SERIOUS COMORBIDITY IN ADULT, UNSPECIFIED BMI (HCC): ICD-10-CM

## 2024-04-18 DIAGNOSIS — I50.32 CHRONIC DIASTOLIC CONGESTIVE HEART FAILURE (HCC): ICD-10-CM

## 2024-04-18 DIAGNOSIS — N20.0 RIGHT KIDNEY STONE: Primary | ICD-10-CM

## 2024-04-18 DIAGNOSIS — Z95.1 H/O TWO VESSEL CORONARY ARTERY BYPASS GRAFT: ICD-10-CM

## 2024-04-18 RX ORDER — LISINOPRIL 5 MG/1
TABLET ORAL
Qty: 90 TABLET | Refills: 1 | Status: SHIPPED | OUTPATIENT
Start: 2024-04-18

## 2024-04-18 ASSESSMENT — ENCOUNTER SYMPTOMS
GASTROINTESTINAL NEGATIVE: 1
EYES NEGATIVE: 1
ALLERGIC/IMMUNOLOGIC NEGATIVE: 1
BACK PAIN: 1
RESPIRATORY NEGATIVE: 1

## 2024-04-18 NOTE — PROGRESS NOTES
Subjective:      Patient ID: Anthony Jacobson Jr. is a 73 y.o. male.    Diabetes  He presents for his follow-up diabetic visit. He has type 2 diabetes mellitus. The initial diagnosis of diabetes was made 6 years ago. His disease course has been stable. There are no hypoglycemic associated symptoms. There are no diabetic associated symptoms. There are no hypoglycemic complications. Symptoms are stable. Diabetic complications include heart disease. Risk factors for coronary artery disease include diabetes mellitus, dyslipidemia, male sex, hypertension, sedentary lifestyle and stress. Current diabetic treatment includes oral agent (monotherapy). He is compliant with treatment all of the time. His weight is stable. He is following a generally healthy diet. Meal planning includes ADA exchanges, avoidance of concentrated sweets, calorie counting and carbohydrate counting. He has had a previous visit with a dietitian. He participates in exercise three times a week. Home blood sugar record trend: He did not bring Accu-Chek log. An ACE inhibitor/angiotensin II receptor blocker is being taken.       Review of Systems   Constitutional: Negative.    HENT: Negative.     Eyes: Negative.    Respiratory: Negative.     Cardiovascular: Negative.    Gastrointestinal: Negative.    Endocrine: Negative.    Musculoskeletal:  Positive for arthralgias and back pain.   Skin: Negative.    Allergic/Immunologic: Negative.    Neurological: Negative.    Hematological: Negative.    Psychiatric/Behavioral:  Positive for decreased concentration.    Past family and social history unremarkable.   Diagnosis Orders   1. Right kidney stone        2. Coronary artery disease involving native coronary artery of native heart without angina pectoris  CBC    Comprehensive Metabolic Panel    Hemoglobin A1C    Lipid Panel    Urinalysis    TSH      3. Dyslipidemia  CBC    Comprehensive Metabolic Panel    Hemoglobin A1C    Lipid Panel    Urinalysis    TSH      4.

## 2024-04-19 ENCOUNTER — CARE COORDINATION (OUTPATIENT)
Dept: CARE COORDINATION | Age: 74
End: 2024-04-19

## 2024-04-19 ENCOUNTER — TELEPHONE (OUTPATIENT)
Dept: GASTROENTEROLOGY | Age: 74
End: 2024-04-19

## 2024-04-19 NOTE — TELEPHONE ENCOUNTER
Attempt 1, writer left pt voicemail message to call office back to schedule colonoscopy/[facility]    Attempt 2, writer sent pt letter/colon screening questionnaire in mail    Colonoscopy (WQ/Screening)  Dx: Colon cancer screening    Pt referral states pt prefers [facility]

## 2024-04-19 NOTE — CARE COORDINATION
Attempted outreach for CC needs, DM and CHF management, wellbeing  No answer and unable to LVM at this time    ACM will attempt outreach for CC needs next week

## 2024-04-26 ENCOUNTER — HOSPITAL ENCOUNTER (OUTPATIENT)
Age: 74
Setting detail: SPECIMEN
Discharge: HOME | End: 2024-04-26

## 2024-04-26 DIAGNOSIS — E78.5 DYSLIPIDEMIA: ICD-10-CM

## 2024-04-26 DIAGNOSIS — Z95.1 H/O TWO VESSEL CORONARY ARTERY BYPASS GRAFT: ICD-10-CM

## 2024-04-26 DIAGNOSIS — I25.10 CORONARY ARTERY DISEASE INVOLVING NATIVE CORONARY ARTERY OF NATIVE HEART WITHOUT ANGINA PECTORIS: ICD-10-CM

## 2024-04-26 DIAGNOSIS — E10.65 TYPE 1 DIABETES MELLITUS WITH HYPERGLYCEMIA (HCC): ICD-10-CM

## 2024-04-26 LAB
ALBUMIN SERPL-MCNC: 4.4 G/DL (ref 3.5–5.2)
ALBUMIN/GLOB SERPL: 2 {RATIO} (ref 1–2.5)
ALP SERPL-CCNC: 84 U/L (ref 40–129)
ALT SERPL-CCNC: 18 U/L (ref 10–50)
ANION GAP SERPL CALCULATED.3IONS-SCNC: 11 MMOL/L (ref 9–16)
AST SERPL-CCNC: 20 U/L (ref 10–50)
BILIRUB SERPL-MCNC: 1.2 MG/DL (ref 0–1.2)
BILIRUB UR QL STRIP: NEGATIVE
BUN SERPL-MCNC: 8 MG/DL (ref 8–23)
CALCIUM SERPL-MCNC: 9.1 MG/DL (ref 8.6–10.4)
CHLORIDE SERPL-SCNC: 104 MMOL/L (ref 98–107)
CHOLEST SERPL-MCNC: 114 MG/DL (ref 0–199)
CHOLESTEROL/HDL RATIO: 3
CLARITY UR: CLEAR
CO2 SERPL-SCNC: 28 MMOL/L (ref 20–31)
COLOR UR: YELLOW
COMMENT: ABNORMAL
CREAT SERPL-MCNC: 0.9 MG/DL (ref 0.7–1.2)
ERYTHROCYTE [DISTWIDTH] IN BLOOD BY AUTOMATED COUNT: 14.6 % (ref 11.8–14.4)
EST. AVERAGE GLUCOSE BLD GHB EST-MCNC: 171 MG/DL
GFR SERPL CREATININE-BSD FRML MDRD: >90 ML/MIN/1.73M2
GLUCOSE SERPL-MCNC: 126 MG/DL (ref 74–99)
GLUCOSE UR STRIP-MCNC: ABNORMAL MG/DL
HBA1C MFR BLD: 7.6 % (ref 4–6)
HCT VFR BLD AUTO: 43.1 % (ref 40.7–50.3)
HDLC SERPL-MCNC: 45 MG/DL
HGB BLD-MCNC: 13.5 G/DL (ref 13–17)
HGB UR QL STRIP.AUTO: NEGATIVE
KETONES UR STRIP-MCNC: NEGATIVE MG/DL
LDLC SERPL CALC-MCNC: 51 MG/DL (ref 0–100)
LEUKOCYTE ESTERASE UR QL STRIP: NEGATIVE
MCH RBC QN AUTO: 26.9 PG (ref 25.2–33.5)
MCHC RBC AUTO-ENTMCNC: 31.3 G/DL (ref 28.4–34.8)
MCV RBC AUTO: 86 FL (ref 82.6–102.9)
NITRITE UR QL STRIP: NEGATIVE
NRBC BLD-RTO: 0 PER 100 WBC
PH UR STRIP: 5.5 [PH] (ref 5–8)
PLATELET # BLD AUTO: 199 K/UL (ref 138–453)
PMV BLD AUTO: 11 FL (ref 8.1–13.5)
POTASSIUM SERPL-SCNC: 4.5 MMOL/L (ref 3.7–5.3)
PROT SERPL-MCNC: 7 G/DL (ref 6.6–8.7)
PROT UR STRIP-MCNC: NEGATIVE MG/DL
RBC # BLD AUTO: 5.01 M/UL (ref 4.21–5.77)
SODIUM SERPL-SCNC: 143 MMOL/L (ref 136–145)
SP GR UR STRIP: 1.01 (ref 1–1.03)
TRIGL SERPL-MCNC: 92 MG/DL
TSH SERPL DL<=0.05 MIU/L-ACNC: 1.77 UIU/ML (ref 0.27–4.2)
UROBILINOGEN UR STRIP-ACNC: NORMAL EU/DL (ref 0–1)
VLDLC SERPL CALC-MCNC: 18 MG/DL
WBC OTHER # BLD: 8.9 K/UL (ref 3.5–11.3)

## 2024-05-08 ENCOUNTER — CARE COORDINATION (OUTPATIENT)
Dept: CARE COORDINATION | Age: 74
End: 2024-05-08

## 2024-05-08 NOTE — CARE COORDINATION
[id number]   6/17/2024  9:00 AM Carlos Ferris MD Neuro Spec Neurology -   7/18/2024  8:15 AM Zaina Gonsalez MD [facility]   ,   Diabetes Assessment    Medic Alert ID: No  Meal Planning: Avoidance of concentrated sweets   How often do you test your blood sugar?: No Testing   Do you have barriers with adherence to non-pharmacologic self-management interventions? (Nutrition/Exercise/Self-Monitoring): No   Have you ever had to go to the ED for symptoms of low blood sugar?: No       No patient-reported symptoms   Do you have hyperglycemia symptoms?: No   Do you have hypoglycemia symptoms?: No   Blood Sugar Monitoring Regimen: Not Testing   Blood Sugar Trends: No Change        ,   Congestive Heart Failure Assessment    Are you currently restricting fluids?: 1800cc  Do you understand a low sodium diet?: Yes  Do you understand how to read food labels?: Yes  Do you salt your food before tasting it?: No     No patient-reported symptoms      Symptoms:  None: Yes   CHF associated angina: Neg, CHF associated dyspnea on exertion: Neg, CHF associated fatigue: Neg, CHF associated leg swelling: Neg, CHF associated orthostatic hypotension: Neg, CHF associated PND: Neg, CHF associated shortness of breath: Neg, CHF associated weakness: Neg      Symptom course: stable  Weight trend: stable  Salt intake watch compared to last visit: stable     ,   Hypertension - Encounter Level         , and   General Assessment    Do you have any symptoms that are causing concern?: No

## 2024-06-05 ENCOUNTER — OFFICE VISIT (OUTPATIENT)
Dept: PODIATRY | Age: 74
End: 2024-06-05
Payer: MEDICARE

## 2024-06-05 VITALS — HEIGHT: 70 IN | BODY MASS INDEX: 30.78 KG/M2 | WEIGHT: 215 LBS

## 2024-06-05 DIAGNOSIS — B35.1 DERMATOPHYTOSIS OF NAIL: ICD-10-CM

## 2024-06-05 DIAGNOSIS — E11.9 TYPE 2 DIABETES MELLITUS WITHOUT COMPLICATION, WITHOUT LONG-TERM CURRENT USE OF INSULIN (HCC): Primary | ICD-10-CM

## 2024-06-05 DIAGNOSIS — E10.65 TYPE 1 DIABETES MELLITUS WITH HYPERGLYCEMIA (HCC): ICD-10-CM

## 2024-06-05 DIAGNOSIS — M79.671 PAIN IN BOTH FEET: ICD-10-CM

## 2024-06-05 DIAGNOSIS — I73.9 PVD (PERIPHERAL VASCULAR DISEASE) (HCC): ICD-10-CM

## 2024-06-05 DIAGNOSIS — M79.672 PAIN IN BOTH FEET: ICD-10-CM

## 2024-06-05 PROCEDURE — 11721 DEBRIDE NAIL 6 OR MORE: CPT | Performed by: PODIATRIST

## 2024-06-05 NOTE — PROGRESS NOTES
[facility] PODIATRY [address]  [address]  [facility] [address]  Dept: [phone]  Dept Fax: [phone]    DIABETIC PROGRESS NOTE  Date of patient's visit: 6/5/2024  Patient's Name:  Anthony Jacobson Jr. YOB: 1950            Patient Care Team:  Zaina Gonsalez MD as PCP - General (Family Medicine)  Brian Reyes MD as PCP - Cardiology (Internal Medicine)  Zaina Gonsalez MD as PCP - Empaneled Provider  Ezequiel Mccabe DO (Podiatry)  Amanda Mccabe DPM as Physician (Podiatry)  Erasmo Castro MD as Consulting Physician (Urology)  Carolina Hines (Audiology)  Parvez Ratliff MD (Endocrinology)  Carlos Ferris MD as Consulting Physician (Neurology)          Chief Complaint   Patient presents with    Diabetes     Diabetic foot care    Peripheral Neuropathy     Bilateral feet       Subjective:   Anthony Jacobson Jr. comes to clinic for Diabetes (Diabetic foot care) and Peripheral Neuropathy (Bilateral feet)    he is a diabetic and states that he is doing well.  Pt currently has complaint of thickened, elongated nails that they cannot manage by themselves.   Pt's primary care physician is Zaina Gonsalez MD last seen 04/18/2024.   Pt's last blood sugar was unknown.  Pt has a new complaint of none today .      Lab Results   Component Value Date    LABA1C 7.6 (H) 04/26/2024      Complains of numbness in the feet bilat.  Past Medical History:   Diagnosis Date    Acute coronary syndrome (HCC)     CAD (coronary artery disease)     CABG X 2 2/24/2012    Chronic diastolic congestive heart failure (HCC) 7/6/2016    Essential hypertension 12/9/2016    Hyperlipidemia 6/28/2012    Kidney stones     Metabolic syndrome     pre diabetes    Obesity 9/19/2013    Umbilical hernia        No Known Allergies  Current Outpatient Medications on File Prior to Visit   Medication Sig Dispense Refill    lisinopril (PRINIVIL;ZESTRIL) 5 MG

## 2024-06-24 RX ORDER — ATORVASTATIN CALCIUM 40 MG/1
40 TABLET, FILM COATED ORAL DAILY
Qty: 90 TABLET | Refills: 1 | Status: SHIPPED | OUTPATIENT
Start: 2024-06-24

## 2024-06-24 NOTE — TELEPHONE ENCOUNTER
Anthony Jacobson Jr. is calling to request a refill on the following medication(s):    Medication Request:  Requested Prescriptions     Pending Prescriptions Disp Refills    atorvastatin (LIPITOR) 40 MG tablet [Pharmacy Med Name: ATORVASTATIN 40 MG TABLET] 90 tablet 1     Sig: TAKE 1 TABLET BY MOUTH DAILY       Last Visit Date (If Applicable):  4/18/2024    Next Visit Date:    7/18/2024

## 2024-07-19 ENCOUNTER — OFFICE VISIT (OUTPATIENT)
Dept: FAMILY MEDICINE CLINIC | Age: 74
End: 2024-07-19

## 2024-07-19 DIAGNOSIS — I25.10 CORONARY ARTERY DISEASE INVOLVING NATIVE CORONARY ARTERY OF NATIVE HEART WITHOUT ANGINA PECTORIS: ICD-10-CM

## 2024-07-19 DIAGNOSIS — N13.8 BPH WITH OBSTRUCTION/LOWER URINARY TRACT SYMPTOMS: ICD-10-CM

## 2024-07-19 DIAGNOSIS — I10 ESSENTIAL HYPERTENSION: ICD-10-CM

## 2024-07-19 DIAGNOSIS — Z95.1 H/O TWO VESSEL CORONARY ARTERY BYPASS GRAFT: ICD-10-CM

## 2024-07-19 DIAGNOSIS — E78.5 DYSLIPIDEMIA: ICD-10-CM

## 2024-07-19 DIAGNOSIS — N20.0 RIGHT KIDNEY STONE: Primary | ICD-10-CM

## 2024-07-19 DIAGNOSIS — E10.65 TYPE 1 DIABETES MELLITUS WITH HYPERGLYCEMIA (HCC): ICD-10-CM

## 2024-07-19 DIAGNOSIS — H91.93 DECREASED HEARING OF BOTH EARS: ICD-10-CM

## 2024-07-19 DIAGNOSIS — I50.32 CHRONIC DIASTOLIC CONGESTIVE HEART FAILURE (HCC): ICD-10-CM

## 2024-07-19 DIAGNOSIS — Z91.199 NON-COMPLIANCE: ICD-10-CM

## 2024-07-19 DIAGNOSIS — E66.01 CLASS 2 SEVERE OBESITY DUE TO EXCESS CALORIES WITH SERIOUS COMORBIDITY IN ADULT, UNSPECIFIED BMI (HCC): ICD-10-CM

## 2024-07-19 DIAGNOSIS — N40.1 BPH WITH OBSTRUCTION/LOWER URINARY TRACT SYMPTOMS: ICD-10-CM

## 2024-07-19 NOTE — PROGRESS NOTES
2024    TELEHEALTH EVALUATION -- Audio/Visual    HPI:    Anthony Jacobson JrEliseo (:  1950) has requested an audio/video evaluation for the following concern(s):     Diagnosis Orders   1. Right kidney stone        2. Coronary artery disease involving native coronary artery of native heart without angina pectoris        3. Dyslipidemia        4. Class 2 severe obesity due to excess calories with serious comorbidity in adult, unspecified BMI (HCC)        5. Chronic diastolic congestive heart failure (HCC)        6. Essential hypertension        7. BPH with obstruction/lower urinary tract symptoms        8. Decreased hearing of both ears        9. Type 1 diabetes mellitus with hyperglycemia (HCC)        10. H/O two vessel coronary artery bypass graft        11. Non-compliance              Review of Systems    Prior to Visit Medications    Medication Sig Taking? Authorizing Provider   atorvastatin (LIPITOR) 40 MG tablet TAKE 1 TABLET BY MOUTH DAILY  Zaina Gonsalez MD   lisinopril (PRINIVIL;ZESTRIL) 5 MG tablet lisinopril 5 mg tablet   Take 1 tablet every day by oral route.  Zaina Gonsalez MD   metFORMIN (GLUCOPHAGE) 1000 MG tablet TAKE 1 TABLET BY MOUTH TWICE A DAY WITH A MEAL  Zaina Gnosalez MD   Insulin Pen Needle 32G X 4 MM MISC 1 each by Does not apply route daily  Patient not taking: Reported on 2024  Zaina Gonsalez MD   metoprolol tartrate (LOPRESSOR) 50 MG tablet Take 1 tablet by mouth 2 times daily  Zaina Gonsalez MD   aspirin (ASPIRIN LOW DOSE) 81 MG chewable tablet CHEW ONE TABLET BY MOUTH DAILY  Zaina Gonsalez MD   dapagliflozin (FARXIGA) 10 MG tablet Take 1 tablet by mouth every morning  Zaina Gonsalez MD       Social History     Tobacco Use    Smoking status: Former     Current packs/day: 0.00     Average packs/day: 0.3 packs/day for 5.0 years (1.6 ttl pk-yrs)     Types: Cigarettes     Start date: 3/6/1987     Quit date: 3/6/1992     Years since quittin.3    Smokeless tobacco: Never

## 2024-08-26 ENCOUNTER — HOSPITAL ENCOUNTER (OUTPATIENT)
Age: 74
Discharge: HOME | End: 2024-08-28
Payer: MEDICARE

## 2024-08-26 ENCOUNTER — HOSPITAL ENCOUNTER (OUTPATIENT)
Dept: GENERAL RADIOLOGY | Age: 74
Discharge: HOME | End: 2024-08-28
Payer: MEDICARE

## 2024-08-26 ENCOUNTER — HOSPITAL ENCOUNTER (OUTPATIENT)
Age: 74
Discharge: HOME | End: 2024-08-26
Payer: MEDICARE

## 2024-08-26 DIAGNOSIS — N20.0 RIGHT NEPHROLITHIASIS: ICD-10-CM

## 2024-08-26 LAB — PSA SERPL-MCNC: 0.5 NG/ML (ref 0–4)

## 2024-08-26 PROCEDURE — 84153 ASSAY OF PSA TOTAL: CPT

## 2024-08-26 PROCEDURE — 74018 RADEX ABDOMEN 1 VIEW: CPT

## 2024-08-26 PROCEDURE — 36415 COLL VENOUS BLD VENIPUNCTURE: CPT

## 2024-08-28 ENCOUNTER — OFFICE VISIT (OUTPATIENT)
Dept: PODIATRY | Age: 74
End: 2024-08-28
Payer: MEDICARE

## 2024-08-28 VITALS — BODY MASS INDEX: 30.78 KG/M2 | HEIGHT: 70 IN | WEIGHT: 215 LBS

## 2024-08-28 DIAGNOSIS — M79.671 PAIN IN BOTH FEET: ICD-10-CM

## 2024-08-28 DIAGNOSIS — B35.1 DERMATOPHYTOSIS OF NAIL: ICD-10-CM

## 2024-08-28 DIAGNOSIS — M79.672 PAIN IN BOTH FEET: ICD-10-CM

## 2024-08-28 DIAGNOSIS — E11.9 TYPE 2 DIABETES MELLITUS WITHOUT COMPLICATION, WITHOUT LONG-TERM CURRENT USE OF INSULIN (HCC): Primary | ICD-10-CM

## 2024-08-28 PROCEDURE — 11721 DEBRIDE NAIL 6 OR MORE: CPT | Performed by: PODIATRIST

## 2024-08-28 NOTE — PROGRESS NOTES
[facility] PODIATRY [address]  [address]  [facility] [address]  Dept: [phone]  Dept Fax: [phone]    DIABETIC PROGRESS NOTE  Date of patient's visit: 8/28/2024  Patient's Name:  Anthony Jacobson Jr. YOB: 1950            Patient Care Team:  Zaina Gonsalez MD as PCP - General (Family Medicine)  Brian Reyes MD as PCP - Cardiology (Internal Medicine)  Zaina Gonsalez MD as PCP - Empaneled Provider  Ezequiel Mccabe DO (Podiatry)  Amanda Mccabe DPM as Physician (Podiatry)  Erasmo Castro MD as Consulting Physician (Urology)  Carolina Hines (Audiology)  Parvez Ratliff MD (Endocrinology)  Carlos Ferris MD as Consulting Physician (Neurology)          Chief Complaint   Patient presents with    Diabetes     Diabetic foot care    Peripheral Neuropathy     Bilateral feet       Subjective:   Anthony Jacobson Jr. comes to clinic for Diabetes (Diabetic foot care) and Peripheral Neuropathy (Bilateral feet)    he is a diabetic and states that she is doing well.  Pt currently has complaint of thickened, elongated nails that they cannot manage by themselves.   Pt's primary care physician is Zaina Gonsalez MD last seen 07/19/2024.   Pt's last blood sugar was unknown.       Lab Results   Component Value Date    LABA1C 7.6 (H) 04/26/2024      Complains of numbness in the feet bilat.  Past Medical History:   Diagnosis Date    Acute coronary syndrome (HCC)     CAD (coronary artery disease)     CABG X 2 2/24/2012    Chronic diastolic congestive heart failure (HCC) 7/6/2016    Essential hypertension 12/9/2016    Hyperlipidemia 6/28/2012    Kidney stones     Metabolic syndrome     pre diabetes    Obesity 9/19/2013    Umbilical hernia        No Known Allergies  Current Outpatient Medications on File Prior to Visit   Medication Sig Dispense Refill    atorvastatin (LIPITOR) 40 MG tablet TAKE 1 TABLET BY MOUTH DAILY 90

## 2024-08-29 DIAGNOSIS — E11.9 TYPE 2 DIABETES MELLITUS WITHOUT COMPLICATION, WITHOUT LONG-TERM CURRENT USE OF INSULIN (HCC): ICD-10-CM

## 2024-08-29 NOTE — TELEPHONE ENCOUNTER
Anthony Jacobson Jr. is calling to request a refill on the following medication(s):    Medication Request:  Requested Prescriptions     Pending Prescriptions Disp Refills    metFORMIN (GLUCOPHAGE) 1000 MG tablet [Pharmacy Med Name: METFORMIN HCL 1,000 MG TABLET] 180 tablet 1     Sig: TAKE 1 TABLET BY MOUTH TWICE A DAY WITH A MEAL       Last Visit Date (If Applicable):  7/19/2024    Next Visit Date:    No upcoming appointments scheduled.       Last refill 2/26/24. Prescription pending.

## 2024-09-12 ENCOUNTER — OFFICE VISIT (OUTPATIENT)
Age: 74
End: 2024-09-12
Payer: MEDICARE

## 2024-09-12 VITALS — WEIGHT: 215 LBS | BODY MASS INDEX: 30.78 KG/M2 | HEIGHT: 70 IN

## 2024-09-12 DIAGNOSIS — N13.8 BPH WITH OBSTRUCTION/LOWER URINARY TRACT SYMPTOMS: ICD-10-CM

## 2024-09-12 DIAGNOSIS — N40.1 BPH WITH OBSTRUCTION/LOWER URINARY TRACT SYMPTOMS: ICD-10-CM

## 2024-09-12 DIAGNOSIS — R35.1 NOCTURIA: ICD-10-CM

## 2024-09-12 DIAGNOSIS — N20.0 RIGHT NEPHROLITHIASIS: Primary | ICD-10-CM

## 2024-09-12 LAB
BILIRUBIN, POC: NORMAL
BLOOD URINE, POC: NORMAL
CLARITY, POC: CLEAR
COLOR, POC: YELLOW
GLUCOSE URINE, POC: NORMAL MG/DL
KETONES, POC: NORMAL
LEUKOCYTE EST, POC: NORMAL
NITRITE, POC: NORMAL
PH, POC: NORMAL
PROTEIN, POC: NORMAL MG/DL
SPECIFIC GRAVITY, POC: NORMAL
UROBILINOGEN, POC: NORMAL

## 2024-09-12 PROCEDURE — 1123F ACP DISCUSS/DSCN MKR DOCD: CPT | Performed by: SPECIALIST

## 2024-09-12 PROCEDURE — 81003 URINALYSIS AUTO W/O SCOPE: CPT | Performed by: SPECIALIST

## 2024-09-12 PROCEDURE — 99214 OFFICE O/P EST MOD 30 MIN: CPT | Performed by: SPECIALIST

## 2024-10-07 RX ORDER — LISINOPRIL 5 MG/1
TABLET ORAL
Qty: 90 TABLET | Refills: 0 | Status: SHIPPED | OUTPATIENT
Start: 2024-10-07

## 2024-10-07 NOTE — TELEPHONE ENCOUNTER
Anthony Jacobson Jr. is calling to request a refill on the following medication(s):    Medication Request:  Requested Prescriptions     Pending Prescriptions Disp Refills    lisinopril (PRINIVIL;ZESTRIL) 5 MG tablet [Pharmacy Med Name: LISINOPRIL 5 MG TABLET] 90 tablet 1     Sig: TAKE 1 TABLET BY MOUTH DAILY       Last Visit Date (If Applicable):  7/19/2024    Next Visit Date:    Visit date not found    Last refilled on 4/18/24. Prescription pending.

## 2024-10-14 RX ORDER — LISINOPRIL 5 MG/1
TABLET ORAL
Qty: 90 TABLET | Refills: 0 | Status: SHIPPED | OUTPATIENT
Start: 2024-10-14

## 2024-10-14 NOTE — TELEPHONE ENCOUNTER
Anthony Jacobson Jr. is calling to request a refill on the following medication(s):    Medication Request:  Requested Prescriptions     Pending Prescriptions Disp Refills    lisinopril (PRINIVIL;ZESTRIL) 5 MG tablet [Pharmacy Med Name: LISINOPRIL 5 MG TABLET] 90 tablet 0     Sig: TAKE 1 TABLET BY MOUTH DAILY       Last Visit Date (If Applicable):  7/19/2024    Next Visit Date:    Visit date not found

## 2024-11-13 ENCOUNTER — OFFICE VISIT (OUTPATIENT)
Dept: PODIATRY | Age: 74
End: 2024-11-13
Payer: MEDICARE

## 2024-11-13 VITALS — BODY MASS INDEX: 30.21 KG/M2 | WEIGHT: 211 LBS | HEIGHT: 70 IN

## 2024-11-13 DIAGNOSIS — E11.9 TYPE 2 DIABETES MELLITUS WITHOUT COMPLICATION, WITHOUT LONG-TERM CURRENT USE OF INSULIN (HCC): Primary | ICD-10-CM

## 2024-11-13 DIAGNOSIS — M79.604 PAIN IN BOTH LOWER EXTREMITIES: ICD-10-CM

## 2024-11-13 DIAGNOSIS — B35.1 DERMATOPHYTOSIS OF NAIL: ICD-10-CM

## 2024-11-13 DIAGNOSIS — I73.9 PVD (PERIPHERAL VASCULAR DISEASE) (HCC): ICD-10-CM

## 2024-11-13 DIAGNOSIS — M79.605 PAIN IN BOTH LOWER EXTREMITIES: ICD-10-CM

## 2024-11-13 PROCEDURE — 11721 DEBRIDE NAIL 6 OR MORE: CPT | Performed by: PODIATRIST

## 2024-11-13 NOTE — PROGRESS NOTES
bilateral.   CFT <5 seconds, Bilateral.  Hair growth absent to the level of the digits, Bilateral.  Edema present, Bilateral.  Varicosities absent, Bilateral. Erythema absent, Bilateral    Neurological: Sensation diminshed to light touch to level of digits, Bilateral.  Protective sensation intact 6/10 sites via 5.07/10g [place]te Monofilament, Bilateral.  negative Tinel's, Bilateral.  negative Valleix sign, Bilateral.      Integument: Warm, dry, supple, Bilateral.  Open lesion absent, Bilateral.  Interdigital maceration absent to web spaces 4, Bilateral.  Nails 1-5 left and 1-5 right thickened > 3.0 mm, dystrophic and crumbly, discolored with subungual debris.  Fissures absent, Bilateral.   General: AAO x 3 in NAD.    Derm  Toenail Description  Sites of Onychomycosis Involvement (Check affected area)  [x] [x] [x] [x] [x] [x] [x] [x] [x] [x]  5 4 3 2 1 1 2 3 4 5                          Right                                        Left    Thickness  [x] [x] [x] [x] [x] [x] [x] [x] [x] [x]  5 4 3 2 1 1 2 3 4 5                         Right                                        Left    Dystrophic Changes   [x] [x] [x] [x] [x] [x] [x] [x] [x] [x]  5 4 3 2 1 1 2 3 4 5                         Right                                        Left    Color   [x] [x] [x] [x] [x] [x] [x] [x] [x] [x]  5 4 3 2 1 1 2 3 4 5                          Right                                        Left    Incurvation/Ingrowin   [] [] [] [] [] [] [] [] [] []  5 4 3 2 1 1 2 3 4 5                         Right                                        Left    Inflammation/Pain   [x] [x] [x] [x] [x] [x] [x] [x] [x] [x]  5 4 3 2 1 1 2 3 4 5                         Right                                        Left        Visual inspection:  Deformity: hammertoe deformity americo feet  amputation: absent  Skin lesions: absent  Edema: right- 2+ pitting edema, left- 2+ pitting edema    Sensory exam:  Monofilament sensation: abnormal - 6/10 via SW

## 2024-12-02 ENCOUNTER — HOSPITAL ENCOUNTER (INPATIENT)
Age: 74
LOS: 3 days | Discharge: HOME | DRG: 324 | End: 2024-12-05
Attending: EMERGENCY MEDICINE | Admitting: HOSPITALIST
Payer: MEDICARE

## 2024-12-02 ENCOUNTER — APPOINTMENT (OUTPATIENT)
Dept: GENERAL RADIOLOGY | Age: 74
DRG: 324 | End: 2024-12-02
Payer: MEDICARE

## 2024-12-02 DIAGNOSIS — R00.1 SYMPTOMATIC BRADYCARDIA: Primary | ICD-10-CM

## 2024-12-02 DIAGNOSIS — R94.30 ABNORMAL CARDIAC FUNCTION TEST: ICD-10-CM

## 2024-12-02 DIAGNOSIS — Z95.5 STENTED CORONARY ARTERY: ICD-10-CM

## 2024-12-02 DIAGNOSIS — R07.9 CHEST PAIN, UNSPECIFIED TYPE: ICD-10-CM

## 2024-12-02 DIAGNOSIS — E11.9 TYPE 2 DIABETES MELLITUS WITHOUT COMPLICATION, WITHOUT LONG-TERM CURRENT USE OF INSULIN (HCC): ICD-10-CM

## 2024-12-02 PROBLEM — R73.9 HYPERGLYCEMIA: Status: ACTIVE | Noted: 2024-12-02

## 2024-12-02 PROBLEM — D72.829 LEUKOCYTOSIS: Status: ACTIVE | Noted: 2024-12-02

## 2024-12-02 LAB
AMPHET UR QL SCN: NEGATIVE
ANION GAP SERPL CALCULATED.3IONS-SCNC: 12 MMOL/L (ref 9–16)
BACTERIA URNS QL MICRO: ABNORMAL
BARBITURATES UR QL SCN: NEGATIVE
BASOPHILS # BLD: 0.07 K/UL (ref 0–0.2)
BASOPHILS NFR BLD: 1 % (ref 0–2)
BENZODIAZ UR QL: NEGATIVE
BILIRUB UR QL STRIP: NEGATIVE
BUN SERPL-MCNC: 13 MG/DL (ref 8–23)
CALCIUM SERPL-MCNC: 8.5 MG/DL (ref 8.6–10.4)
CANNABINOIDS UR QL SCN: NEGATIVE
CASTS #/AREA URNS LPF: ABNORMAL /LPF (ref 0–8)
CHLORIDE SERPL-SCNC: 100 MMOL/L (ref 98–107)
CLARITY UR: CLEAR
CO2 SERPL-SCNC: 25 MMOL/L (ref 20–31)
COCAINE UR QL SCN: NEGATIVE
COLOR UR: YELLOW
CREAT SERPL-MCNC: 1 MG/DL (ref 0.7–1.2)
EOSINOPHIL # BLD: 0.07 K/UL (ref 0–0.44)
EOSINOPHILS RELATIVE PERCENT: 1 % (ref 1–4)
EPI CELLS #/AREA URNS HPF: ABNORMAL /HPF (ref 0–5)
ERYTHROCYTE [DISTWIDTH] IN BLOOD BY AUTOMATED COUNT: 13.2 % (ref 11.8–14.4)
FENTANYL UR QL: NEGATIVE
GFR, ESTIMATED: 80 ML/MIN/1.73M2
GLUCOSE BLD-MCNC: 203 MG/DL (ref 75–110)
GLUCOSE BLD-MCNC: 278 MG/DL (ref 75–110)
GLUCOSE SERPL-MCNC: 265 MG/DL (ref 74–99)
GLUCOSE UR STRIP-MCNC: ABNORMAL MG/DL
HCT VFR BLD AUTO: 43.3 % (ref 40.7–50.3)
HGB BLD-MCNC: 13.9 G/DL (ref 13–17)
HGB UR QL STRIP.AUTO: NEGATIVE
IMM GRANULOCYTES # BLD AUTO: 0.08 K/UL (ref 0–0.3)
IMM GRANULOCYTES NFR BLD: 1 %
KETONES UR STRIP-MCNC: ABNORMAL MG/DL
LEUKOCYTE ESTERASE UR QL STRIP: NEGATIVE
LYMPHOCYTES NFR BLD: 1.26 K/UL (ref 1.1–3.7)
LYMPHOCYTES RELATIVE PERCENT: 9 % (ref 24–43)
MCH RBC QN AUTO: 27.2 PG (ref 25.2–33.5)
MCHC RBC AUTO-ENTMCNC: 32.1 G/DL (ref 28.4–34.8)
MCV RBC AUTO: 84.7 FL (ref 82.6–102.9)
METHADONE UR QL: NEGATIVE
MONOCYTES NFR BLD: 0.85 K/UL (ref 0.1–1.2)
MONOCYTES NFR BLD: 6 % (ref 3–12)
NEUTROPHILS NFR BLD: 82 % (ref 36–65)
NEUTS SEG NFR BLD: 11.42 K/UL (ref 1.5–8.1)
NITRITE UR QL STRIP: NEGATIVE
NRBC BLD-RTO: 0 PER 100 WBC
OPIATES UR QL SCN: NEGATIVE
OXYCODONE UR QL SCN: NEGATIVE
PCP UR QL SCN: NEGATIVE
PH UR STRIP: 5.5 [PH] (ref 5–8)
PLATELET # BLD AUTO: 216 K/UL (ref 138–453)
PMV BLD AUTO: 9.8 FL (ref 8.1–13.5)
POTASSIUM SERPL-SCNC: 3.9 MMOL/L (ref 3.7–5.3)
PROT UR STRIP-MCNC: ABNORMAL MG/DL
RBC # BLD AUTO: 5.11 M/UL (ref 4.21–5.77)
RBC #/AREA URNS HPF: ABNORMAL /HPF (ref 0–4)
SODIUM SERPL-SCNC: 137 MMOL/L (ref 136–145)
SP GR UR STRIP: 1.04 (ref 1–1.03)
TEST INFORMATION: NORMAL
TROPONIN I SERPL HS-MCNC: 22 NG/L (ref 0–22)
TROPONIN I SERPL HS-MCNC: 25 NG/L (ref 0–22)
TROPONIN I SERPL HS-MCNC: 26 NG/L (ref 0–22)
TROPONIN I SERPL HS-MCNC: 28 NG/L (ref 0–22)
TSH SERPL DL<=0.05 MIU/L-ACNC: 1.47 UIU/ML (ref 0.27–4.2)
UROBILINOGEN UR STRIP-ACNC: NORMAL EU/DL (ref 0–1)
WBC #/AREA URNS HPF: ABNORMAL /HPF (ref 0–5)
WBC OTHER # BLD: 13.8 K/UL (ref 3.5–11.3)

## 2024-12-02 PROCEDURE — 6370000000 HC RX 637 (ALT 250 FOR IP)

## 2024-12-02 PROCEDURE — 87040 BLOOD CULTURE FOR BACTERIA: CPT

## 2024-12-02 PROCEDURE — 1200000000 HC SEMI PRIVATE

## 2024-12-02 PROCEDURE — 82947 ASSAY GLUCOSE BLOOD QUANT: CPT

## 2024-12-02 PROCEDURE — 36415 COLL VENOUS BLD VENIPUNCTURE: CPT

## 2024-12-02 PROCEDURE — 81001 URINALYSIS AUTO W/SCOPE: CPT

## 2024-12-02 PROCEDURE — 99223 1ST HOSP IP/OBS HIGH 75: CPT | Performed by: HOSPITALIST

## 2024-12-02 PROCEDURE — 85025 COMPLETE CBC W/AUTO DIFF WBC: CPT

## 2024-12-02 PROCEDURE — 80048 BASIC METABOLIC PNL TOTAL CA: CPT

## 2024-12-02 PROCEDURE — 84484 ASSAY OF TROPONIN QUANT: CPT

## 2024-12-02 PROCEDURE — 71046 X-RAY EXAM CHEST 2 VIEWS: CPT

## 2024-12-02 PROCEDURE — 99285 EMERGENCY DEPT VISIT HI MDM: CPT

## 2024-12-02 PROCEDURE — 87086 URINE CULTURE/COLONY COUNT: CPT

## 2024-12-02 PROCEDURE — 80307 DRUG TEST PRSMV CHEM ANLYZR: CPT

## 2024-12-02 PROCEDURE — 93005 ELECTROCARDIOGRAM TRACING: CPT

## 2024-12-02 PROCEDURE — 84443 ASSAY THYROID STIM HORMONE: CPT

## 2024-12-02 PROCEDURE — 6360000002 HC RX W HCPCS

## 2024-12-02 PROCEDURE — 2580000003 HC RX 258

## 2024-12-02 RX ORDER — LISINOPRIL 5 MG/1
5 TABLET ORAL DAILY
Status: DISCONTINUED | OUTPATIENT
Start: 2024-12-02 | End: 2024-12-05 | Stop reason: HOSPADM

## 2024-12-02 RX ORDER — ACETAMINOPHEN 325 MG/1
650 TABLET ORAL EVERY 6 HOURS PRN
Status: DISCONTINUED | OUTPATIENT
Start: 2024-12-02 | End: 2024-12-05 | Stop reason: HOSPADM

## 2024-12-02 RX ORDER — POTASSIUM CHLORIDE 1500 MG/1
40 TABLET, EXTENDED RELEASE ORAL PRN
Status: DISCONTINUED | OUTPATIENT
Start: 2024-12-02 | End: 2024-12-05 | Stop reason: HOSPADM

## 2024-12-02 RX ORDER — POTASSIUM CHLORIDE 7.45 MG/ML
10 INJECTION INTRAVENOUS PRN
Status: DISCONTINUED | OUTPATIENT
Start: 2024-12-02 | End: 2024-12-05 | Stop reason: HOSPADM

## 2024-12-02 RX ORDER — REGADENOSON 0.08 MG/ML
0.4 INJECTION, SOLUTION INTRAVENOUS
Status: CANCELLED | OUTPATIENT
Start: 2024-12-03

## 2024-12-02 RX ORDER — ASPIRIN 81 MG/1
324 TABLET, CHEWABLE ORAL ONCE
Status: COMPLETED | OUTPATIENT
Start: 2024-12-02 | End: 2024-12-02

## 2024-12-02 RX ORDER — ASPIRIN 81 MG/1
81 TABLET, CHEWABLE ORAL DAILY
Status: DISCONTINUED | OUTPATIENT
Start: 2024-12-02 | End: 2024-12-05 | Stop reason: HOSPADM

## 2024-12-02 RX ORDER — SODIUM CHLORIDE 9 MG/ML
500 INJECTION, SOLUTION INTRAVENOUS CONTINUOUS PRN
Status: CANCELLED | OUTPATIENT
Start: 2024-12-03 | End: 2024-12-03

## 2024-12-02 RX ORDER — SODIUM CHLORIDE 9 MG/ML
INJECTION, SOLUTION INTRAVENOUS PRN
Status: DISCONTINUED | OUTPATIENT
Start: 2024-12-02 | End: 2024-12-05 | Stop reason: HOSPADM

## 2024-12-02 RX ORDER — DEXTROSE MONOHYDRATE 100 MG/ML
INJECTION, SOLUTION INTRAVENOUS CONTINUOUS PRN
Status: DISCONTINUED | OUTPATIENT
Start: 2024-12-02 | End: 2024-12-05 | Stop reason: HOSPADM

## 2024-12-02 RX ORDER — METOPROLOL TARTRATE 1 MG/ML
5 INJECTION, SOLUTION INTRAVENOUS EVERY 5 MIN PRN
Status: CANCELLED | OUTPATIENT
Start: 2024-12-03 | End: 2024-12-03

## 2024-12-02 RX ORDER — ENOXAPARIN SODIUM 100 MG/ML
40 INJECTION SUBCUTANEOUS DAILY
Status: DISCONTINUED | OUTPATIENT
Start: 2024-12-02 | End: 2024-12-05 | Stop reason: HOSPADM

## 2024-12-02 RX ORDER — SODIUM CHLORIDE 0.9 % (FLUSH) 0.9 %
5-40 SYRINGE (ML) INJECTION EVERY 12 HOURS SCHEDULED
Status: DISCONTINUED | OUTPATIENT
Start: 2024-12-02 | End: 2024-12-05 | Stop reason: HOSPADM

## 2024-12-02 RX ORDER — SODIUM CHLORIDE 0.9 % (FLUSH) 0.9 %
5-40 SYRINGE (ML) INJECTION PRN
Status: CANCELLED | OUTPATIENT
Start: 2024-12-03 | End: 2024-12-03

## 2024-12-02 RX ORDER — ATROPINE SULFATE 0.1 MG/ML
0.5 INJECTION INTRAVENOUS EVERY 5 MIN PRN
Status: CANCELLED | OUTPATIENT
Start: 2024-12-03 | End: 2024-12-03

## 2024-12-02 RX ORDER — INSULIN GLARGINE 100 [IU]/ML
5 INJECTION, SOLUTION SUBCUTANEOUS NIGHTLY
Status: DISCONTINUED | OUTPATIENT
Start: 2024-12-02 | End: 2024-12-04

## 2024-12-02 RX ORDER — ACETAMINOPHEN 650 MG/1
650 SUPPOSITORY RECTAL EVERY 6 HOURS PRN
Status: DISCONTINUED | OUTPATIENT
Start: 2024-12-02 | End: 2024-12-05 | Stop reason: HOSPADM

## 2024-12-02 RX ORDER — SODIUM CHLORIDE 0.9 % (FLUSH) 0.9 %
5-40 SYRINGE (ML) INJECTION PRN
Status: DISCONTINUED | OUTPATIENT
Start: 2024-12-02 | End: 2024-12-05 | Stop reason: HOSPADM

## 2024-12-02 RX ORDER — ONDANSETRON 2 MG/ML
4 INJECTION INTRAMUSCULAR; INTRAVENOUS EVERY 6 HOURS PRN
Status: DISCONTINUED | OUTPATIENT
Start: 2024-12-02 | End: 2024-12-05 | Stop reason: HOSPADM

## 2024-12-02 RX ORDER — ONDANSETRON 4 MG/1
4 TABLET, ORALLY DISINTEGRATING ORAL EVERY 8 HOURS PRN
Status: DISCONTINUED | OUTPATIENT
Start: 2024-12-02 | End: 2024-12-05 | Stop reason: HOSPADM

## 2024-12-02 RX ORDER — METOPROLOL TARTRATE 50 MG
50 TABLET ORAL 2 TIMES DAILY
Status: DISCONTINUED | OUTPATIENT
Start: 2024-12-02 | End: 2024-12-03

## 2024-12-02 RX ORDER — MAGNESIUM SULFATE IN WATER 40 MG/ML
2000 INJECTION, SOLUTION INTRAVENOUS PRN
Status: DISCONTINUED | OUTPATIENT
Start: 2024-12-02 | End: 2024-12-05 | Stop reason: HOSPADM

## 2024-12-02 RX ORDER — GLUCAGON 1 MG/ML
1 KIT INJECTION PRN
Status: DISCONTINUED | OUTPATIENT
Start: 2024-12-02 | End: 2024-12-05 | Stop reason: HOSPADM

## 2024-12-02 RX ORDER — ATORVASTATIN CALCIUM 40 MG/1
40 TABLET, FILM COATED ORAL DAILY
Status: DISCONTINUED | OUTPATIENT
Start: 2024-12-02 | End: 2024-12-05 | Stop reason: HOSPADM

## 2024-12-02 RX ORDER — INSULIN LISPRO 100 [IU]/ML
0-8 INJECTION, SOLUTION INTRAVENOUS; SUBCUTANEOUS
Status: DISCONTINUED | OUTPATIENT
Start: 2024-12-02 | End: 2024-12-05 | Stop reason: HOSPADM

## 2024-12-02 RX ORDER — ALBUTEROL SULFATE 90 UG/1
2 INHALANT RESPIRATORY (INHALATION) PRN
Status: CANCELLED | OUTPATIENT
Start: 2024-12-03 | End: 2024-12-03

## 2024-12-02 RX ORDER — HYDRALAZINE HYDROCHLORIDE 20 MG/ML
10 INJECTION INTRAMUSCULAR; INTRAVENOUS EVERY 6 HOURS PRN
Status: DISCONTINUED | OUTPATIENT
Start: 2024-12-02 | End: 2024-12-05 | Stop reason: HOSPADM

## 2024-12-02 RX ORDER — AMINOPHYLLINE 25 MG/ML
50 INJECTION, SOLUTION INTRAVENOUS PRN
Status: CANCELLED | OUTPATIENT
Start: 2024-12-03 | End: 2024-12-03

## 2024-12-02 RX ORDER — POLYETHYLENE GLYCOL 3350 17 G/17G
17 POWDER, FOR SOLUTION ORAL DAILY PRN
Status: DISCONTINUED | OUTPATIENT
Start: 2024-12-02 | End: 2024-12-05 | Stop reason: HOSPADM

## 2024-12-02 RX ORDER — NITROGLYCERIN 0.4 MG/1
0.4 TABLET SUBLINGUAL EVERY 5 MIN PRN
Status: CANCELLED | OUTPATIENT
Start: 2024-12-03 | End: 2024-12-03

## 2024-12-02 RX ADMIN — SODIUM CHLORIDE, PRESERVATIVE FREE 10 ML: 5 INJECTION INTRAVENOUS at 20:33

## 2024-12-02 RX ADMIN — ASPIRIN 324 MG: 81 TABLET, CHEWABLE ORAL at 08:58

## 2024-12-02 RX ADMIN — LISINOPRIL 5 MG: 5 TABLET ORAL at 13:34

## 2024-12-02 RX ADMIN — INSULIN GLARGINE 5 UNITS: 100 INJECTION, SOLUTION SUBCUTANEOUS at 20:32

## 2024-12-02 RX ADMIN — HYDRALAZINE HYDROCHLORIDE 10 MG: 20 INJECTION INTRAMUSCULAR; INTRAVENOUS at 15:59

## 2024-12-02 RX ADMIN — INSULIN LISPRO 4 UNITS: 100 INJECTION, SOLUTION INTRAVENOUS; SUBCUTANEOUS at 15:59

## 2024-12-02 RX ADMIN — INSULIN LISPRO 2 UNITS: 100 INJECTION, SOLUTION INTRAVENOUS; SUBCUTANEOUS at 20:32

## 2024-12-02 ASSESSMENT — PAIN SCALES - GENERAL
PAINLEVEL_OUTOF10: 3
PAINLEVEL_OUTOF10: 0

## 2024-12-02 ASSESSMENT — ENCOUNTER SYMPTOMS
VOICE CHANGE: 0
CONSTIPATION: 1
BLOOD IN STOOL: 0
COLOR CHANGE: 0
SHORTNESS OF BREATH: 0
COUGH: 0
ANAL BLEEDING: 0
ABDOMINAL PAIN: 0
DIARRHEA: 0
SORE THROAT: 0
CHEST TIGHTNESS: 0
NAUSEA: 0
BACK PAIN: 0
TROUBLE SWALLOWING: 0
VOMITING: 0
CHOKING: 0

## 2024-12-02 ASSESSMENT — PAIN - FUNCTIONAL ASSESSMENT: PAIN_FUNCTIONAL_ASSESSMENT: 0-10

## 2024-12-02 NOTE — H&P
[facility]     Department of Internal Medicine - Staff Internal Medicine Teaching Service          ADMISSION NOTE/HISTORY AND PHYSICAL EXAMINATION   Date: 12/2/2024  Patient Name: Anthony Jacobson Jr.  Date of admission: 12/2/2024  8:40 AM  YOB: 1950  PCP: Zaina Gonsalez MD  History Obtained From:  patient    CHIEF COMPLAINT     Chief complaint: Sinus bradycardia    HISTORY OF PRESENTING ILLNESS     The patient is a 74 y.o. male with past medical history of  CABGX 2 2/24/2012 LIMA-LAD VG-OM1(on aspirin, statin and beta-blocker)  Chronic diastolic heart failure(Farxiga 10 mg)  Type 2 diabetes mellitus(metformin 500 mg)  Primary hypertension(lisinopril 5 mg)    A patient presented with a complaint of chest pain that began earlier today. The pain is primarily localized to the epigastric region and radiates toward the subcostal area. The patient describes the pain as mild discomfort, rating it 3 out of 10. The patient denies chest pain, nausea, or vomiting. Additionally, the patient reported constipation, stating that he has not had a bowel movement for the past two days, with their normal bowel movement occurring daily. However, the patient is passing flatus.The patient has a history of an umbilical hernia but denies any pain at the site.  Vital signs in the emergency room were concerning for bradycardia, with a heart rate as low as 40 bpm. However, the patient denied lightheadedness, dizziness, presyncope, or syncope and did not report shortness of breath.  The patient has a prior history of bradycardia, with a dose reduction of Lopressor from 50 mg twice daily to 25 mg twice daily on 10/12/2021. An ECG showed sinus bradycardia with a ventricular rate of 46 bpm, normal axis, and a mildly prolonged QTc interval of 456 ms. There were no significant ST segment changes.  Troponin levels were elevated at 28, but they downtrended to 26. Thyroid-stimulating hormone (TSH) was

## 2024-12-02 NOTE — ED NOTES
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 Pt acting age appropriate. White board updated, will continue to plan of care    
Resident at bedside  
Neutrophils Absolute 11.42 (*)     All other components within normal limits   BASIC METABOLIC PANEL - Abnormal; Notable for the following components:    Glucose 265 (*)     Calcium 8.5 (*)     All other components within normal limits   TROPONIN - Abnormal; Notable for the following components:    Troponin, High Sensitivity 28 (*)     All other components within normal limits   TROPONIN - Abnormal; Notable for the following components:    Troponin, High Sensitivity 26 (*)     All other components within normal limits       Electronically signed by Estefani Car RN on 12/2/2024 at 11:20 AM

## 2024-12-03 ENCOUNTER — APPOINTMENT (OUTPATIENT)
Dept: NUCLEAR MEDICINE | Age: 74
DRG: 324 | End: 2024-12-03
Payer: MEDICARE

## 2024-12-03 ENCOUNTER — APPOINTMENT (OUTPATIENT)
Age: 74
DRG: 324 | End: 2024-12-03
Payer: MEDICARE

## 2024-12-03 ENCOUNTER — HOSPITAL ENCOUNTER (OUTPATIENT)
Age: 74
Discharge: HOME | End: 2024-12-03

## 2024-12-03 ENCOUNTER — HOSPITAL ENCOUNTER (INPATIENT)
Age: 74
Discharge: HOME | DRG: 324 | End: 2024-12-05
Payer: MEDICARE

## 2024-12-03 LAB
ANION GAP SERPL CALCULATED.3IONS-SCNC: 13 MMOL/L (ref 9–16)
BASOPHILS # BLD: 0 K/UL (ref 0–0.2)
BASOPHILS NFR BLD: 0 % (ref 0–2)
BUN SERPL-MCNC: 13 MG/DL (ref 8–23)
CALCIUM SERPL-MCNC: 8.3 MG/DL (ref 8.6–10.4)
CHLORIDE SERPL-SCNC: 102 MMOL/L (ref 98–107)
CO2 SERPL-SCNC: 22 MMOL/L (ref 20–31)
CREAT SERPL-MCNC: 0.8 MG/DL (ref 0.7–1.2)
ECHO AO ROOT DIAM: 3.7 CM
ECHO AO ROOT INDEX: 1.73 CM/M2
ECHO AV AREA PEAK VELOCITY: 2.2 CM2
ECHO AV AREA VTI: 2.2 CM2
ECHO AV AREA/BSA PEAK VELOCITY: 1 CM2/M2
ECHO AV AREA/BSA VTI: 1 CM2/M2
ECHO AV MEAN GRADIENT: 6 MMHG
ECHO AV MEAN VELOCITY: 1.1 M/S
ECHO AV PEAK GRADIENT: 15 MMHG
ECHO AV PEAK VELOCITY: 1.9 M/S
ECHO AV VELOCITY RATIO: 0.68
ECHO AV VTI: 33.5 CM
ECHO BSA: 2.18 M2
ECHO BSA: 2.18 M2
ECHO EST RA PRESSURE: 8 MMHG
ECHO LA AREA 2C: 23.9 CM2
ECHO LA AREA 4C: 26.2 CM2
ECHO LA DIAMETER INDEX: 1.73 CM/M2
ECHO LA DIAMETER: 3.7 CM
ECHO LA MAJOR AXIS: 7.9 CM
ECHO LA MINOR AXIS: 6.6 CM
ECHO LA TO AORTIC ROOT RATIO: 1
ECHO LA VOL BP: 71 ML (ref 18–58)
ECHO LA VOL MOD A2C: 72 ML (ref 18–58)
ECHO LA VOL MOD A4C: 59 ML (ref 18–58)
ECHO LA VOL/BSA BIPLANE: 33 ML/M2 (ref 16–34)
ECHO LA VOLUME INDEX MOD A2C: 34 ML/M2 (ref 16–34)
ECHO LA VOLUME INDEX MOD A4C: 28 ML/M2 (ref 16–34)
ECHO LV E' LATERAL VELOCITY: 5.55 CM/S
ECHO LV E' SEPTAL VELOCITY: 4.24 CM/S
ECHO LV EDV A2C: 72 ML
ECHO LV EDV A4C: 68 ML
ECHO LV EDV INDEX A4C: 32 ML/M2
ECHO LV EDV NDEX A2C: 34 ML/M2
ECHO LV EJECTION FRACTION A2C: 61 %
ECHO LV EJECTION FRACTION A4C: 44 %
ECHO LV EJECTION FRACTION BIPLANE: 52 % (ref 55–100)
ECHO LV ESV A2C: 28 ML
ECHO LV ESV A4C: 38 ML
ECHO LV ESV INDEX A2C: 13 ML/M2
ECHO LV ESV INDEX A4C: 18 ML/M2
ECHO LV FRACTIONAL SHORTENING: 24 % (ref 28–44)
ECHO LV INTERNAL DIMENSION DIASTOLE INDEX: 1.96 CM/M2
ECHO LV INTERNAL DIMENSION DIASTOLIC: 4.2 CM (ref 4.2–5.9)
ECHO LV INTERNAL DIMENSION SYSTOLIC INDEX: 1.5 CM/M2
ECHO LV INTERNAL DIMENSION SYSTOLIC: 3.2 CM
ECHO LV IVSD: 1.1 CM (ref 0.6–1)
ECHO LV MASS 2D: 147 G (ref 88–224)
ECHO LV MASS INDEX 2D: 68.7 G/M2 (ref 49–115)
ECHO LV POSTERIOR WALL DIASTOLIC: 1 CM (ref 0.6–1)
ECHO LV RELATIVE WALL THICKNESS RATIO: 0.48
ECHO LVOT AREA: 3.1 CM2
ECHO LVOT AV VTI INDEX: 0.71
ECHO LVOT DIAM: 2 CM
ECHO LVOT MEAN GRADIENT: 3 MMHG
ECHO LVOT PEAK GRADIENT: 7 MMHG
ECHO LVOT PEAK VELOCITY: 1.3 M/S
ECHO LVOT STROKE VOLUME INDEX: 34.9 ML/M2
ECHO LVOT SV: 74.7 ML
ECHO LVOT VTI: 23.8 CM
ECHO MV A VELOCITY: 0.28 M/S
ECHO MV AREA VTI: 3 CM2
ECHO MV E DECELERATION TIME (DT): 148 MS
ECHO MV E VELOCITY: 0.81 M/S
ECHO MV E/A RATIO: 2.89
ECHO MV E/E' LATERAL: 14.59
ECHO MV E/E' RATIO (AVERAGED): 16.85
ECHO MV E/E' SEPTAL: 19.1
ECHO MV LVOT VTI INDEX: 1.06
ECHO MV MAX VELOCITY: 1 M/S
ECHO MV MEAN GRADIENT: 1 MMHG
ECHO MV MEAN VELOCITY: 0.5 M/S
ECHO MV PEAK GRADIENT: 4 MMHG
ECHO MV VTI: 25.2 CM
ECHO PV MAX VELOCITY: 1.2 M/S
ECHO PV PEAK GRADIENT: 5 MMHG
ECHO RIGHT VENTRICULAR SYSTOLIC PRESSURE (RVSP): 69 MMHG
ECHO RV BASAL DIMENSION: 4.8 CM
ECHO RV FREE WALL PEAK S': 10.7 CM/S
ECHO TV REGURGITANT MAX VELOCITY: 3.89 M/S
ECHO TV REGURGITANT PEAK GRADIENT: 61 MMHG
EOSINOPHIL # BLD: 0 K/UL (ref 0–0.44)
EOSINOPHILS RELATIVE PERCENT: 0 % (ref 1–4)
ERYTHROCYTE [DISTWIDTH] IN BLOOD BY AUTOMATED COUNT: 13.4 % (ref 11.8–14.4)
EST. AVERAGE GLUCOSE BLD GHB EST-MCNC: 258 MG/DL
GFR, ESTIMATED: >90 ML/MIN/1.73M2
GLUCOSE BLD-MCNC: 149 MG/DL (ref 75–110)
GLUCOSE BLD-MCNC: 164 MG/DL (ref 75–110)
GLUCOSE BLD-MCNC: 281 MG/DL (ref 75–110)
GLUCOSE SERPL-MCNC: 173 MG/DL (ref 74–99)
HBA1C MFR BLD: 10.6 % (ref 4–6)
HCT VFR BLD AUTO: 41.8 % (ref 40.7–50.3)
HGB BLD-MCNC: 13.1 G/DL (ref 13–17)
IMM GRANULOCYTES # BLD AUTO: 0.15 K/UL (ref 0–0.3)
IMM GRANULOCYTES NFR BLD: 1 %
LYMPHOCYTES NFR BLD: 1.45 K/UL (ref 1.1–3.7)
LYMPHOCYTES RELATIVE PERCENT: 10 % (ref 24–43)
MCH RBC QN AUTO: 26.9 PG (ref 25.2–33.5)
MCHC RBC AUTO-ENTMCNC: 31.3 G/DL (ref 28.4–34.8)
MCV RBC AUTO: 85.8 FL (ref 82.6–102.9)
MICROORGANISM SPEC CULT: NO GROWTH
MONOCYTES NFR BLD: 1.6 K/UL (ref 0.1–1.2)
MONOCYTES NFR BLD: 11 % (ref 3–12)
MORPHOLOGY: NORMAL
NEUTROPHILS NFR BLD: 78 % (ref 36–65)
NEUTS SEG NFR BLD: 11.3 K/UL (ref 1.5–8.1)
NRBC BLD-RTO: 0 PER 100 WBC
NUC STRESS EJECTION FRACTION: 62 %
PLATELET # BLD AUTO: 142 K/UL (ref 138–453)
PMV BLD AUTO: 10.3 FL (ref 8.1–13.5)
POTASSIUM SERPL-SCNC: 3.8 MMOL/L (ref 3.7–5.3)
RBC # BLD AUTO: 4.87 M/UL (ref 4.21–5.77)
SERVICE CMNT-IMP: NORMAL
SODIUM SERPL-SCNC: 137 MMOL/L (ref 136–145)
SPECIMEN DESCRIPTION: NORMAL
STRESS BASELINE DIAS BP: 74 MMHG
STRESS BASELINE HR: 65 BPM
STRESS BASELINE SYS BP: 162 MMHG
STRESS ESTIMATED WORKLOAD: 1 METS
STRESS PEAK DIAS BP: 74 MMHG
STRESS PEAK SYS BP: 162 MMHG
STRESS PERCENT HR ACHIEVED: 58 %
STRESS POST PEAK HR: 86 BPM
STRESS RATE PRESSURE PRODUCT: NORMAL BPM*MMHG
STRESS TARGET HR: 148 BPM
TID: 1.14
WBC OTHER # BLD: 14.5 K/UL (ref 3.5–11.3)

## 2024-12-03 PROCEDURE — 2580000003 HC RX 258

## 2024-12-03 PROCEDURE — 3430000000 HC RX DIAGNOSTIC RADIOPHARMACEUTICAL

## 2024-12-03 PROCEDURE — 99233 SBSQ HOSP IP/OBS HIGH 50: CPT | Performed by: HOSPITALIST

## 2024-12-03 PROCEDURE — 83036 HEMOGLOBIN GLYCOSYLATED A1C: CPT

## 2024-12-03 PROCEDURE — 1200000000 HC SEMI PRIVATE

## 2024-12-03 PROCEDURE — 93018 CV STRESS TEST I&R ONLY: CPT | Performed by: INTERNAL MEDICINE

## 2024-12-03 PROCEDURE — 6370000000 HC RX 637 (ALT 250 FOR IP)

## 2024-12-03 PROCEDURE — 78452 HT MUSCLE IMAGE SPECT MULT: CPT

## 2024-12-03 PROCEDURE — 93017 CV STRESS TEST TRACING ONLY: CPT

## 2024-12-03 PROCEDURE — 99223 1ST HOSP IP/OBS HIGH 75: CPT | Performed by: INTERNAL MEDICINE

## 2024-12-03 PROCEDURE — 80048 BASIC METABOLIC PNL TOTAL CA: CPT

## 2024-12-03 PROCEDURE — 6370000000 HC RX 637 (ALT 250 FOR IP): Performed by: INTERNAL MEDICINE

## 2024-12-03 PROCEDURE — 93016 CV STRESS TEST SUPVJ ONLY: CPT | Performed by: INTERNAL MEDICINE

## 2024-12-03 PROCEDURE — 82947 ASSAY GLUCOSE BLOOD QUANT: CPT

## 2024-12-03 PROCEDURE — 93306 TTE W/DOPPLER COMPLETE: CPT

## 2024-12-03 PROCEDURE — 6360000002 HC RX W HCPCS

## 2024-12-03 PROCEDURE — 93306 TTE W/DOPPLER COMPLETE: CPT | Performed by: INTERNAL MEDICINE

## 2024-12-03 PROCEDURE — 85025 COMPLETE CBC W/AUTO DIFF WBC: CPT

## 2024-12-03 PROCEDURE — A9500 TC99M SESTAMIBI: HCPCS

## 2024-12-03 PROCEDURE — 36415 COLL VENOUS BLD VENIPUNCTURE: CPT

## 2024-12-03 RX ORDER — ATROPINE SULFATE 0.1 MG/ML
0.5 INJECTION INTRAVENOUS EVERY 5 MIN PRN
Status: DISCONTINUED | OUTPATIENT
Start: 2024-12-03 | End: 2024-12-03

## 2024-12-03 RX ORDER — ALBUTEROL SULFATE 90 UG/1
2 INHALANT RESPIRATORY (INHALATION) PRN
Status: DISCONTINUED | OUTPATIENT
Start: 2024-12-03 | End: 2024-12-03

## 2024-12-03 RX ORDER — TETRAKIS(2-METHOXYISOBUTYLISOCYANIDE)COPPER(I) TETRAFLUOROBORATE 1 MG/ML
44 INJECTION, POWDER, LYOPHILIZED, FOR SOLUTION INTRAVENOUS
Status: COMPLETED | OUTPATIENT
Start: 2024-12-03 | End: 2024-12-03

## 2024-12-03 RX ORDER — SODIUM CHLORIDE 0.9 % (FLUSH) 0.9 %
10 SYRINGE (ML) INJECTION PRN
Status: DISCONTINUED | OUTPATIENT
Start: 2024-12-03 | End: 2024-12-05 | Stop reason: HOSPADM

## 2024-12-03 RX ORDER — METOPROLOL TARTRATE 1 MG/ML
5 INJECTION, SOLUTION INTRAVENOUS EVERY 5 MIN PRN
Status: DISCONTINUED | OUTPATIENT
Start: 2024-12-03 | End: 2024-12-03

## 2024-12-03 RX ORDER — NITROGLYCERIN 0.4 MG/1
0.4 TABLET SUBLINGUAL EVERY 5 MIN PRN
Status: DISCONTINUED | OUTPATIENT
Start: 2024-12-03 | End: 2024-12-03

## 2024-12-03 RX ORDER — TETRAKIS(2-METHOXYISOBUTYLISOCYANIDE)COPPER(I) TETRAFLUOROBORATE 1 MG/ML
16.9 INJECTION, POWDER, LYOPHILIZED, FOR SOLUTION INTRAVENOUS
Status: COMPLETED | OUTPATIENT
Start: 2024-12-03 | End: 2024-12-03

## 2024-12-03 RX ORDER — CARVEDILOL 3.12 MG/1
3.12 TABLET ORAL 2 TIMES DAILY WITH MEALS
Status: DISCONTINUED | OUTPATIENT
Start: 2024-12-03 | End: 2024-12-05 | Stop reason: HOSPADM

## 2024-12-03 RX ORDER — AMINOPHYLLINE 25 MG/ML
50 INJECTION, SOLUTION INTRAVENOUS PRN
Status: DISCONTINUED | OUTPATIENT
Start: 2024-12-03 | End: 2024-12-03

## 2024-12-03 RX ORDER — SODIUM CHLORIDE 9 MG/ML
500 INJECTION, SOLUTION INTRAVENOUS CONTINUOUS PRN
Status: DISCONTINUED | OUTPATIENT
Start: 2024-12-03 | End: 2024-12-03

## 2024-12-03 RX ORDER — SODIUM CHLORIDE 0.9 % (FLUSH) 0.9 %
5-40 SYRINGE (ML) INJECTION PRN
Status: DISCONTINUED | OUTPATIENT
Start: 2024-12-03 | End: 2024-12-03

## 2024-12-03 RX ORDER — REGADENOSON 0.08 MG/ML
0.4 INJECTION, SOLUTION INTRAVENOUS
Status: COMPLETED | OUTPATIENT
Start: 2024-12-03 | End: 2024-12-03

## 2024-12-03 RX ADMIN — ATORVASTATIN CALCIUM 40 MG: 40 TABLET, FILM COATED ORAL at 13:39

## 2024-12-03 RX ADMIN — SODIUM CHLORIDE, PRESERVATIVE FREE 10 ML: 5 INJECTION INTRAVENOUS at 09:15

## 2024-12-03 RX ADMIN — SODIUM CHLORIDE, PRESERVATIVE FREE 10 ML: 5 INJECTION INTRAVENOUS at 11:01

## 2024-12-03 RX ADMIN — LISINOPRIL 5 MG: 5 TABLET ORAL at 13:39

## 2024-12-03 RX ADMIN — SODIUM CHLORIDE, PRESERVATIVE FREE 10 ML: 5 INJECTION INTRAVENOUS at 12:45

## 2024-12-03 RX ADMIN — ASPIRIN 81 MG: 81 TABLET, CHEWABLE ORAL at 13:39

## 2024-12-03 RX ADMIN — CARVEDILOL 3.12 MG: 3.12 TABLET, FILM COATED ORAL at 16:36

## 2024-12-03 RX ADMIN — SODIUM CHLORIDE, PRESERVATIVE FREE 10 ML: 5 INJECTION INTRAVENOUS at 20:53

## 2024-12-03 RX ADMIN — TETRAKIS(2-METHOXYISOBUTYLISOCYANIDE)COPPER(I) TETRAFLUOROBORATE 44 MILLICURIE: 1 INJECTION, POWDER, LYOPHILIZED, FOR SOLUTION INTRAVENOUS at 11:04

## 2024-12-03 RX ADMIN — REGADENOSON 0.4 MG: 0.08 INJECTION, SOLUTION INTRAVENOUS at 11:03

## 2024-12-03 RX ADMIN — INSULIN GLARGINE 5 UNITS: 100 INJECTION, SOLUTION SUBCUTANEOUS at 20:53

## 2024-12-03 RX ADMIN — CARVEDILOL 3.12 MG: 3.12 TABLET, FILM COATED ORAL at 13:39

## 2024-12-03 RX ADMIN — SODIUM CHLORIDE, PRESERVATIVE FREE 10 ML: 5 INJECTION INTRAVENOUS at 11:03

## 2024-12-03 RX ADMIN — TETRAKIS(2-METHOXYISOBUTYLISOCYANIDE)COPPER(I) TETRAFLUOROBORATE 16.9 MILLICURIE: 1 INJECTION, POWDER, LYOPHILIZED, FOR SOLUTION INTRAVENOUS at 09:15

## 2024-12-03 RX ADMIN — INSULIN LISPRO 4 UNITS: 100 INJECTION, SOLUTION INTRAVENOUS; SUBCUTANEOUS at 20:52

## 2024-12-03 ASSESSMENT — ENCOUNTER SYMPTOMS
SHORTNESS OF BREATH: 0
ABDOMINAL PAIN: 0

## 2024-12-03 NOTE — CONSULTS
[facility]    Consult / H&P               Today's Date: 12/3/2024  Patient Name: Anthony Jacobson Jr.  Date of admission: 12/2/2024  8:40 AM  Patient's age: 74 y.o., 1950  Admission Dx: Bradycardia [R00.1]  Sinus bradycardia [R00.1]  Symptomatic bradycardia [R00.1]    Reason for Consult:  Cardiac evaluation    Requesting Physician: Lukas Vasquez MD    CHIEF COMPLAINT:  chest pain    History Obtained From:  patient    HISTORY OF PRESENT ILLNESS:      The patient is a 74 y.o.  male who is admitted to the hospital for Chest Pain  Anthony Jacobson Jr. complains of chest pain. Onset was 1 day ago. Symptoms have improved since that time. The patient's pain is associated with activities. The patient describes the pain as sharp and does not radiate. Patient rates pain as a 4/10 in intensity. Associated symptoms are: none. Aggravating factors are:  exhertion . Alleviating factors are: rest. Patient's cardiac risk factors are: advanced age (older than 55 for men, 65 for women), diabetes mellitus, dyslipidemia, hypertension, male gender, and obesity (BMI >= 30 kg/m2). Patient's risk factors for DVT/PE: none. Previous cardiac testing: echocardiogram and exercise thallium stress test on 2/22/12 .    Past Medical History:   has a past medical history of Acute coronary syndrome (HCC), CAD (coronary artery disease), Chronic diastolic congestive heart failure (HCC), Essential hypertension, Hyperlipidemia, Kidney stones, Metabolic syndrome, Obesity, and Umbilical hernia.    Past Surgical History:   has a past surgical history that includes Cardiac catheterization (2/23/12); Coronary artery bypass graft (2/24/12); and Umbilical hernia repair.     Home Medications:    Prior to Admission medications    Medication Sig Start Date End Date Taking? Authorizing Provider   lisinopril (PRINIVIL;ZESTRIL) 5 MG tablet TAKE 1 TABLET BY MOUTH DAILY 10/14/24  Yes Zaina Gonsalez MD   metFORMIN

## 2024-12-04 PROBLEM — R94.30 ABNORMAL CARDIAC FUNCTION TEST: Status: ACTIVE | Noted: 2024-12-02

## 2024-12-04 LAB
ANION GAP SERPL CALCULATED.3IONS-SCNC: 15 MMOL/L (ref 9–16)
BASOPHILS # BLD: 0 K/UL (ref 0–0.2)
BASOPHILS NFR BLD: 0 % (ref 0–2)
BUN SERPL-MCNC: 11 MG/DL (ref 8–23)
CALCIUM SERPL-MCNC: 8.5 MG/DL (ref 8.6–10.4)
CHLORIDE SERPL-SCNC: 100 MMOL/L (ref 98–107)
CO2 SERPL-SCNC: 21 MMOL/L (ref 20–31)
CREAT SERPL-MCNC: 0.8 MG/DL (ref 0.7–1.2)
ECHO BSA: 2.18 M2
EOSINOPHIL # BLD: 0 K/UL (ref 0–0.44)
EOSINOPHILS RELATIVE PERCENT: 0 % (ref 1–4)
ERYTHROCYTE [DISTWIDTH] IN BLOOD BY AUTOMATED COUNT: 13.2 % (ref 11.8–14.4)
GFR, ESTIMATED: >90 ML/MIN/1.73M2
GLUCOSE BLD-MCNC: 159 MG/DL (ref 75–110)
GLUCOSE BLD-MCNC: 248 MG/DL (ref 75–110)
GLUCOSE SERPL-MCNC: 156 MG/DL (ref 74–99)
HCT VFR BLD AUTO: 44.4 % (ref 40.7–50.3)
HGB BLD-MCNC: 14.2 G/DL (ref 13–17)
IMM GRANULOCYTES # BLD AUTO: 0.15 K/UL (ref 0–0.3)
IMM GRANULOCYTES NFR BLD: 1 %
LYMPHOCYTES NFR BLD: 1.06 K/UL (ref 1.1–3.7)
LYMPHOCYTES RELATIVE PERCENT: 7 % (ref 24–43)
MCH RBC QN AUTO: 26.9 PG (ref 25.2–33.5)
MCHC RBC AUTO-ENTMCNC: 32 G/DL (ref 28.4–34.8)
MCV RBC AUTO: 84.3 FL (ref 82.6–102.9)
MONOCYTES NFR BLD: 1.82 K/UL (ref 0.1–1.2)
MONOCYTES NFR BLD: 12 % (ref 3–12)
MORPHOLOGY: NORMAL
NEUTROPHILS NFR BLD: 80 % (ref 36–65)
NEUTS SEG NFR BLD: 12.17 K/UL (ref 1.5–8.1)
NRBC BLD-RTO: 0 PER 100 WBC
PLATELET # BLD AUTO: ABNORMAL K/UL (ref 138–453)
PLATELET, FLUORESCENCE: 114 K/UL (ref 138–453)
PLATELETS.RETICULATED NFR BLD AUTO: 3.8 % (ref 1.1–10.3)
POTASSIUM SERPL-SCNC: 3.6 MMOL/L (ref 3.7–5.3)
RBC # BLD AUTO: 5.27 M/UL (ref 4.21–5.77)
SODIUM SERPL-SCNC: 136 MMOL/L (ref 136–145)
WBC OTHER # BLD: 15.2 K/UL (ref 3.5–11.3)

## 2024-12-04 PROCEDURE — 85055 RETICULATED PLATELET ASSAY: CPT

## 2024-12-04 PROCEDURE — 2580000003 HC RX 258

## 2024-12-04 PROCEDURE — 2709999900 HC NON-CHARGEABLE SUPPLY: Performed by: INTERNAL MEDICINE

## 2024-12-04 PROCEDURE — C1769 GUIDE WIRE: HCPCS | Performed by: INTERNAL MEDICINE

## 2024-12-04 PROCEDURE — 6360000004 HC RX CONTRAST MEDICATION: Performed by: INTERNAL MEDICINE

## 2024-12-04 PROCEDURE — 99152 MOD SED SAME PHYS/QHP 5/>YRS: CPT | Performed by: INTERNAL MEDICINE

## 2024-12-04 PROCEDURE — 7100000011 HC PHASE II RECOVERY - ADDTL 15 MIN: Performed by: INTERNAL MEDICINE

## 2024-12-04 PROCEDURE — 80048 BASIC METABOLIC PNL TOTAL CA: CPT

## 2024-12-04 PROCEDURE — 6370000000 HC RX 637 (ALT 250 FOR IP)

## 2024-12-04 PROCEDURE — 02F13ZZ FRAGMENTATION IN CORONARY ARTERY, TWO ARTERIES, PERCUTANEOUS APPROACH: ICD-10-PCS | Performed by: INTERNAL MEDICINE

## 2024-12-04 PROCEDURE — 2060000000 HC ICU INTERMEDIATE R&B

## 2024-12-04 PROCEDURE — 027135Z DILATION OF CORONARY ARTERY, TWO ARTERIES WITH TWO DRUG-ELUTING INTRALUMINAL DEVICES, PERCUTANEOUS APPROACH: ICD-10-PCS | Performed by: INTERNAL MEDICINE

## 2024-12-04 PROCEDURE — 2580000003 HC RX 258: Performed by: INTERNAL MEDICINE

## 2024-12-04 PROCEDURE — 6360000002 HC RX W HCPCS: Performed by: INTERNAL MEDICINE

## 2024-12-04 PROCEDURE — 93455 CORONARY ART/GRFT ANGIO S&I: CPT | Performed by: INTERNAL MEDICINE

## 2024-12-04 PROCEDURE — 92928 PRQ TCAT PLMT NTRAC ST 1 LES: CPT | Performed by: INTERNAL MEDICINE

## 2024-12-04 PROCEDURE — C1725 CATH, TRANSLUMIN NON-LASER: HCPCS | Performed by: INTERNAL MEDICINE

## 2024-12-04 PROCEDURE — 6370000000 HC RX 637 (ALT 250 FOR IP): Performed by: STUDENT IN AN ORGANIZED HEALTH CARE EDUCATION/TRAINING PROGRAM

## 2024-12-04 PROCEDURE — C1874 STENT, COATED/COV W/DEL SYS: HCPCS | Performed by: INTERNAL MEDICINE

## 2024-12-04 PROCEDURE — 6370000000 HC RX 637 (ALT 250 FOR IP): Performed by: INTERNAL MEDICINE

## 2024-12-04 PROCEDURE — 99232 SBSQ HOSP IP/OBS MODERATE 35: CPT | Performed by: HOSPITALIST

## 2024-12-04 PROCEDURE — 92920 PRQ TRLUML C ANGIOP 1ART&/BR: CPT | Performed by: INTERNAL MEDICINE

## 2024-12-04 PROCEDURE — 92972 PERQ TRLUML CORONRY LITHOTRP: CPT | Performed by: INTERNAL MEDICINE

## 2024-12-04 PROCEDURE — 6360000002 HC RX W HCPCS

## 2024-12-04 PROCEDURE — C1892 INTRO/SHEATH,FIXED,PEEL-AWAY: HCPCS | Performed by: INTERNAL MEDICINE

## 2024-12-04 PROCEDURE — C1887 CATHETER, GUIDING: HCPCS | Performed by: INTERNAL MEDICINE

## 2024-12-04 PROCEDURE — 36415 COLL VENOUS BLD VENIPUNCTURE: CPT

## 2024-12-04 PROCEDURE — 82947 ASSAY GLUCOSE BLOOD QUANT: CPT

## 2024-12-04 PROCEDURE — 99233 SBSQ HOSP IP/OBS HIGH 50: CPT | Performed by: NURSE PRACTITIONER

## 2024-12-04 PROCEDURE — 7100000010 HC PHASE II RECOVERY - FIRST 15 MIN: Performed by: INTERNAL MEDICINE

## 2024-12-04 PROCEDURE — C1761 HC CATH TRANSLUM INTRAVASCULAR LITHOTRIPSY CORONARY: HCPCS | Performed by: INTERNAL MEDICINE

## 2024-12-04 PROCEDURE — 4A023N7 MEASUREMENT OF CARDIAC SAMPLING AND PRESSURE, LEFT HEART, PERCUTANEOUS APPROACH: ICD-10-PCS | Performed by: INTERNAL MEDICINE

## 2024-12-04 PROCEDURE — 85025 COMPLETE CBC W/AUTO DIFF WBC: CPT

## 2024-12-04 PROCEDURE — 99153 MOD SED SAME PHYS/QHP EA: CPT | Performed by: INTERNAL MEDICINE

## 2024-12-04 PROCEDURE — C1894 INTRO/SHEATH, NON-LASER: HCPCS | Performed by: INTERNAL MEDICINE

## 2024-12-04 PROCEDURE — C1760 CLOSURE DEV, VASC: HCPCS | Performed by: INTERNAL MEDICINE

## 2024-12-04 PROCEDURE — C9600 PERC DRUG-EL COR STENT SING: HCPCS | Performed by: INTERNAL MEDICINE

## 2024-12-04 PROCEDURE — B2111ZZ FLUOROSCOPY OF MULTIPLE CORONARY ARTERIES USING LOW OSMOLAR CONTRAST: ICD-10-PCS | Performed by: INTERNAL MEDICINE

## 2024-12-04 PROCEDURE — C1753 CATH, INTRAVAS ULTRASOUND: HCPCS | Performed by: INTERNAL MEDICINE

## 2024-12-04 PROCEDURE — B240ZZ3 ULTRASONOGRAPHY OF SINGLE CORONARY ARTERY, INTRAVASCULAR: ICD-10-PCS | Performed by: INTERNAL MEDICINE

## 2024-12-04 DEVICE — XIENCE SIERRA™ EVEROLIMUS ELUTING CORONARY STENT SYSTEM 3.25 MM X 15 MM / RAPID-EXCHANGE
Type: IMPLANTABLE DEVICE | Status: FUNCTIONAL
Brand: XIENCE SIERRA™

## 2024-12-04 DEVICE — STENT ONYXNG40038UX ONYX 4.00X38RX
Type: IMPLANTABLE DEVICE | Status: FUNCTIONAL
Brand: ONYX FRONTIER™

## 2024-12-04 RX ORDER — SODIUM CHLORIDE 0.9 % (FLUSH) 0.9 %
5-40 SYRINGE (ML) INJECTION EVERY 12 HOURS SCHEDULED
Status: DISCONTINUED | OUTPATIENT
Start: 2024-12-04 | End: 2024-12-05 | Stop reason: HOSPADM

## 2024-12-04 RX ORDER — NITROGLYCERIN 20 MG/100ML
INJECTION INTRAVENOUS CONTINUOUS PRN
Status: DISCONTINUED | OUTPATIENT
Start: 2024-12-04 | End: 2024-12-04 | Stop reason: HOSPADM

## 2024-12-04 RX ORDER — FENTANYL CITRATE 50 UG/ML
INJECTION, SOLUTION INTRAMUSCULAR; INTRAVENOUS PRN
Status: DISCONTINUED | OUTPATIENT
Start: 2024-12-04 | End: 2024-12-04 | Stop reason: HOSPADM

## 2024-12-04 RX ORDER — NITROGLYCERIN 20 MG/100ML
INJECTION INTRAVENOUS PRN
Status: DISCONTINUED | OUTPATIENT
Start: 2024-12-04 | End: 2024-12-04 | Stop reason: HOSPADM

## 2024-12-04 RX ORDER — ACETAMINOPHEN 325 MG/1
650 TABLET ORAL EVERY 4 HOURS PRN
Status: DISCONTINUED | OUTPATIENT
Start: 2024-12-04 | End: 2024-12-05 | Stop reason: HOSPADM

## 2024-12-04 RX ORDER — SODIUM CHLORIDE 9 MG/ML
INJECTION, SOLUTION INTRAVENOUS PRN
Status: DISCONTINUED | OUTPATIENT
Start: 2024-12-04 | End: 2024-12-05 | Stop reason: HOSPADM

## 2024-12-04 RX ORDER — MIDAZOLAM HYDROCHLORIDE 1 MG/ML
INJECTION, SOLUTION INTRAMUSCULAR; INTRAVENOUS PRN
Status: DISCONTINUED | OUTPATIENT
Start: 2024-12-04 | End: 2024-12-04 | Stop reason: HOSPADM

## 2024-12-04 RX ORDER — IOPAMIDOL 755 MG/ML
INJECTION, SOLUTION INTRAVASCULAR PRN
Status: DISCONTINUED | OUTPATIENT
Start: 2024-12-04 | End: 2024-12-04 | Stop reason: HOSPADM

## 2024-12-04 RX ORDER — BIVALIRUDIN 250 MG/5ML
INJECTION, POWDER, LYOPHILIZED, FOR SOLUTION INTRAVENOUS PRN
Status: DISCONTINUED | OUTPATIENT
Start: 2024-12-04 | End: 2024-12-04 | Stop reason: HOSPADM

## 2024-12-04 RX ORDER — ASPIRIN 81 MG/1
TABLET, CHEWABLE ORAL PRN
Status: DISCONTINUED | OUTPATIENT
Start: 2024-12-04 | End: 2024-12-04 | Stop reason: HOSPADM

## 2024-12-04 RX ORDER — INSULIN GLARGINE 100 [IU]/ML
10 INJECTION, SOLUTION SUBCUTANEOUS NIGHTLY
Status: DISCONTINUED | OUTPATIENT
Start: 2024-12-04 | End: 2024-12-05 | Stop reason: HOSPADM

## 2024-12-04 RX ORDER — SODIUM CHLORIDE 0.9 % (FLUSH) 0.9 %
5-40 SYRINGE (ML) INJECTION PRN
Status: DISCONTINUED | OUTPATIENT
Start: 2024-12-04 | End: 2024-12-05 | Stop reason: HOSPADM

## 2024-12-04 RX ADMIN — ATORVASTATIN CALCIUM 40 MG: 40 TABLET, FILM COATED ORAL at 07:43

## 2024-12-04 RX ADMIN — CARVEDILOL 3.12 MG: 3.12 TABLET, FILM COATED ORAL at 07:43

## 2024-12-04 RX ADMIN — LISINOPRIL 5 MG: 5 TABLET ORAL at 07:43

## 2024-12-04 RX ADMIN — INSULIN GLARGINE 10 UNITS: 100 INJECTION, SOLUTION SUBCUTANEOUS at 20:21

## 2024-12-04 RX ADMIN — SODIUM CHLORIDE, PRESERVATIVE FREE 10 ML: 5 INJECTION INTRAVENOUS at 20:22

## 2024-12-04 RX ADMIN — TICAGRELOR 90 MG: 90 TABLET ORAL at 20:21

## 2024-12-04 RX ADMIN — ENOXAPARIN SODIUM 40 MG: 100 INJECTION SUBCUTANEOUS at 07:43

## 2024-12-04 RX ADMIN — ASPIRIN 81 MG: 81 TABLET, CHEWABLE ORAL at 07:43

## 2024-12-04 RX ADMIN — INSULIN LISPRO 2 UNITS: 100 INJECTION, SOLUTION INTRAVENOUS; SUBCUTANEOUS at 20:21

## 2024-12-04 RX ADMIN — CARVEDILOL 3.12 MG: 3.12 TABLET, FILM COATED ORAL at 18:51

## 2024-12-04 RX ADMIN — SODIUM CHLORIDE, PRESERVATIVE FREE 10 ML: 5 INJECTION INTRAVENOUS at 07:43

## 2024-12-04 ASSESSMENT — ENCOUNTER SYMPTOMS
ABDOMINAL PAIN: 0
SHORTNESS OF BREATH: 0

## 2024-12-04 NOTE — FLOWSHEET NOTE
Patient transported to cath lab 1104 via transport. Writer called and notified that patient will be transported to 2025 belongings sent to cath lab with sister. Car 2 RN declined report.

## 2024-12-04 NOTE — PLAN OF CARE
Problem: Discharge Planning  Goal: Discharge to home or other facility with appropriate resources  Outcome: Progressing  Flowsheets (Taken 12/4/2024 0751 by Keena Baptiste, RN)  Discharge to home or other facility with appropriate resources:   Identify barriers to discharge with patient and caregiver   Arrange for needed discharge resources and transportation as appropriate   Identify discharge learning needs (meds, wound care, etc)     Problem: Pain  Goal: Verbalizes/displays adequate comfort level or baseline comfort level  Outcome: Progressing  Flowsheets (Taken 12/4/2024 0745 by Keena Baptiste, RN)  Verbalizes/displays adequate comfort level or baseline comfort level:   Encourage patient to monitor pain and request assistance   Assess pain using appropriate pain scale   Administer analgesics based on type and severity of pain and evaluate response   Implement non-pharmacological measures as appropriate and evaluate response     Problem: Chronic Conditions and Co-morbidities  Goal: Patient's chronic conditions and co-morbidity symptoms are monitored and maintained or improved  Outcome: Progressing  Flowsheets (Taken 12/4/2024 0751 by Keena Baptiste, RN)  Care Plan - Patient's Chronic Conditions and Co-Morbidity Symptoms are Monitored and Maintained or Improved:   Monitor and assess patient's chronic conditions and comorbid symptoms for stability, deterioration, or improvement   Collaborate with multidisciplinary team to address chronic and comorbid conditions and prevent exacerbation or deterioration   Update acute care plan with appropriate goals if chronic or comorbid symptoms are exacerbated and prevent overall improvement and discharge     Problem: Safety - Adult  Goal: Free from fall injury  Outcome: Progressing  Flowsheets (Taken 12/4/2024 0753 by Keena Baptiste, RN)  Free From Fall Injury: Instruct family/caregiver on patient safety

## 2024-12-04 NOTE — PLAN OF CARE
Problem: Discharge Planning  Goal: Discharge to home or other facility with appropriate resources  12/4/2024 0146 by Araceli Graham RN  Outcome: Progressing  12/3/2024 1752 by Milly Harper RN  Outcome: Progressing     Problem: Pain  Goal: Verbalizes/displays adequate comfort level or baseline comfort level  12/4/2024 0146 by Araceli Graham RN  Outcome: Progressing  Flowsheets (Taken 12/4/2024 0015)  Verbalizes/displays adequate comfort level or baseline comfort level:   Encourage patient to monitor pain and request assistance   Administer analgesics based on type and severity of pain and evaluate response   Assess pain using appropriate pain scale   Implement non-pharmacological measures as appropriate and evaluate response   Consider cultural and social influences on pain and pain management   Notify Licensed Independent Practitioner if interventions unsuccessful or patient reports new pain  12/3/2024 1752 by Milly Harper, RN  Outcome: Progressing     Problem: Chronic Conditions and Co-morbidities  Goal: Patient's chronic conditions and co-morbidity symptoms are monitored and maintained or improved  Outcome: Progressing

## 2024-12-05 VITALS
RESPIRATION RATE: 23 BRPM | DIASTOLIC BLOOD PRESSURE: 69 MMHG | BODY MASS INDEX: 30.35 KG/M2 | WEIGHT: 212 LBS | SYSTOLIC BLOOD PRESSURE: 133 MMHG | HEIGHT: 70 IN | HEART RATE: 64 BPM | OXYGEN SATURATION: 93 % | TEMPERATURE: 98.2 F

## 2024-12-05 LAB
ANION GAP SERPL CALCULATED.3IONS-SCNC: 12 MMOL/L (ref 9–16)
BASOPHILS # BLD: 0 K/UL (ref 0–0.2)
BASOPHILS NFR BLD: 0 % (ref 0–2)
BUN SERPL-MCNC: 15 MG/DL (ref 8–23)
CALCIUM SERPL-MCNC: 8.3 MG/DL (ref 8.6–10.4)
CHLORIDE SERPL-SCNC: 101 MMOL/L (ref 98–107)
CO2 SERPL-SCNC: 23 MMOL/L (ref 20–31)
CREAT SERPL-MCNC: 0.9 MG/DL (ref 0.7–1.2)
EKG ATRIAL RATE: 46 BPM
EKG P AXIS: 82 DEGREES
EKG P-R INTERVAL: 142 MS
EKG Q-T INTERVAL: 522 MS
EKG QRS DURATION: 86 MS
EKG QTC CALCULATION (BAZETT): 456 MS
EKG R AXIS: 8 DEGREES
EKG T AXIS: 147 DEGREES
EKG VENTRICULAR RATE: 46 BPM
EOSINOPHIL # BLD: 0 K/UL (ref 0–0.4)
EOSINOPHILS RELATIVE PERCENT: 0 % (ref 1–4)
ERYTHROCYTE [DISTWIDTH] IN BLOOD BY AUTOMATED COUNT: 13.4 % (ref 11.8–14.4)
GFR, ESTIMATED: >90 ML/MIN/1.73M2
GLUCOSE BLD-MCNC: 171 MG/DL (ref 75–110)
GLUCOSE BLD-MCNC: 177 MG/DL (ref 75–110)
GLUCOSE BLD-MCNC: 257 MG/DL (ref 75–110)
GLUCOSE SERPL-MCNC: 178 MG/DL (ref 74–99)
HCT VFR BLD AUTO: 41.9 % (ref 40.7–50.3)
HGB BLD-MCNC: 13.3 G/DL (ref 13–17)
IMM GRANULOCYTES # BLD AUTO: 0 K/UL (ref 0–0.3)
IMM GRANULOCYTES NFR BLD: 0 %
LYMPHOCYTES NFR BLD: 1.06 K/UL (ref 1–4.8)
LYMPHOCYTES RELATIVE PERCENT: 6 % (ref 24–44)
MAGNESIUM SERPL-MCNC: 1.9 MG/DL (ref 1.6–2.4)
MCH RBC QN AUTO: 26.6 PG (ref 25.2–33.5)
MCHC RBC AUTO-ENTMCNC: 31.7 G/DL (ref 28.4–34.8)
MCV RBC AUTO: 83.8 FL (ref 82.6–102.9)
MONOCYTES NFR BLD: 1.94 K/UL (ref 0.1–0.8)
MONOCYTES NFR BLD: 11 % (ref 1–7)
MORPHOLOGY: NORMAL
NEUTROPHILS NFR BLD: 83 % (ref 36–66)
NEUTS SEG NFR BLD: 14.6 K/UL (ref 1.8–7.7)
NRBC BLD-RTO: 0 PER 100 WBC
PLATELET # BLD AUTO: 100 K/UL (ref 138–453)
PMV BLD AUTO: 10.7 FL (ref 8.1–13.5)
POTASSIUM SERPL-SCNC: 3.5 MMOL/L (ref 3.7–5.3)
RBC # BLD AUTO: 5 M/UL (ref 4.21–5.77)
SODIUM SERPL-SCNC: 136 MMOL/L (ref 136–145)
WBC OTHER # BLD: 17.6 K/UL (ref 3.5–11.3)

## 2024-12-05 PROCEDURE — 83735 ASSAY OF MAGNESIUM: CPT

## 2024-12-05 PROCEDURE — 6370000000 HC RX 637 (ALT 250 FOR IP): Performed by: INTERNAL MEDICINE

## 2024-12-05 PROCEDURE — 6370000000 HC RX 637 (ALT 250 FOR IP)

## 2024-12-05 PROCEDURE — 93010 ELECTROCARDIOGRAM REPORT: CPT | Performed by: INTERNAL MEDICINE

## 2024-12-05 PROCEDURE — 80048 BASIC METABOLIC PNL TOTAL CA: CPT

## 2024-12-05 PROCEDURE — 6360000002 HC RX W HCPCS

## 2024-12-05 PROCEDURE — 36415 COLL VENOUS BLD VENIPUNCTURE: CPT

## 2024-12-05 PROCEDURE — 6370000000 HC RX 637 (ALT 250 FOR IP): Performed by: STUDENT IN AN ORGANIZED HEALTH CARE EDUCATION/TRAINING PROGRAM

## 2024-12-05 PROCEDURE — 85025 COMPLETE CBC W/AUTO DIFF WBC: CPT

## 2024-12-05 PROCEDURE — 2580000003 HC RX 258: Performed by: STUDENT IN AN ORGANIZED HEALTH CARE EDUCATION/TRAINING PROGRAM

## 2024-12-05 PROCEDURE — 82947 ASSAY GLUCOSE BLOOD QUANT: CPT

## 2024-12-05 RX ORDER — CARVEDILOL 3.12 MG/1
3.12 TABLET ORAL 2 TIMES DAILY WITH MEALS
Qty: 60 TABLET | Refills: 2 | Status: SHIPPED | OUTPATIENT
Start: 2024-12-05 | End: 2025-03-05

## 2024-12-05 RX ORDER — DAPAGLIFLOZIN 10 MG/1
10 TABLET, FILM COATED ORAL EVERY MORNING
Qty: 30 TABLET | Refills: 2 | Status: SHIPPED | OUTPATIENT
Start: 2024-12-05 | End: 2024-12-05

## 2024-12-05 RX ORDER — CARVEDILOL 3.12 MG/1
3.12 TABLET ORAL 2 TIMES DAILY WITH MEALS
Qty: 60 TABLET | Refills: 3 | Status: SHIPPED | OUTPATIENT
Start: 2024-12-05 | End: 2024-12-05

## 2024-12-05 RX ORDER — DAPAGLIFLOZIN 10 MG/1
10 TABLET, FILM COATED ORAL EVERY MORNING
Qty: 30 TABLET | Refills: 2 | Status: SHIPPED | OUTPATIENT
Start: 2024-12-05 | End: 2025-03-05

## 2024-12-05 RX ADMIN — TICAGRELOR 90 MG: 90 TABLET ORAL at 08:29

## 2024-12-05 RX ADMIN — ATORVASTATIN CALCIUM 40 MG: 40 TABLET, FILM COATED ORAL at 08:30

## 2024-12-05 RX ADMIN — CARVEDILOL 3.12 MG: 3.12 TABLET, FILM COATED ORAL at 08:29

## 2024-12-05 RX ADMIN — INSULIN LISPRO 4 UNITS: 100 INJECTION, SOLUTION INTRAVENOUS; SUBCUTANEOUS at 12:26

## 2024-12-05 RX ADMIN — ASPIRIN 81 MG: 81 TABLET, CHEWABLE ORAL at 08:30

## 2024-12-05 RX ADMIN — LISINOPRIL 5 MG: 5 TABLET ORAL at 08:30

## 2024-12-05 RX ADMIN — POTASSIUM CHLORIDE 40 MEQ: 1500 TABLET, EXTENDED RELEASE ORAL at 08:46

## 2024-12-05 RX ADMIN — SODIUM CHLORIDE, PRESERVATIVE FREE 10 ML: 5 INJECTION INTRAVENOUS at 08:30

## 2024-12-05 RX ADMIN — ENOXAPARIN SODIUM 40 MG: 100 INJECTION SUBCUTANEOUS at 08:30

## 2024-12-05 ASSESSMENT — ENCOUNTER SYMPTOMS: SHORTNESS OF BREATH: 0

## 2024-12-05 ASSESSMENT — PAIN SCALES - GENERAL: PAINLEVEL_OUTOF10: 0

## 2024-12-05 NOTE — DISCHARGE INSTRUCTIONS
You were admitted to the hospital with chest pain and low heart rate  You underwent cardiac catheterization which showed severe disease of the blood vessels of the heart.  A stent was placed in one of the blood vessels of the heart  Start taking Brilinta 90 Mg daily.  Start taking Coreg 3.125 Mg 2 times a day  Stop taking Lopressor 50 Mg  Continue taking your old medications as prescribed  Follow-up with PCP in 1 week  Follow-up with your prior established cardiologist Dr. Reyes in  2 weeks  In case of worsening symptoms or new symptoms arise, please visit ER

## 2024-12-05 NOTE — PLAN OF CARE
Problem: Discharge Planning  Goal: Discharge to home or other facility with appropriate resources  12/5/2024 1044 by Adarsh Mancilla RN  Outcome: Progressing  12/4/2024 2059 by Radha Huggins RN  Outcome: Progressing  Flowsheets (Taken 12/4/2024 2000)  Discharge to home or other facility with appropriate resources: Identify barriers to discharge with patient and caregiver     Problem: Pain  Goal: Verbalizes/displays adequate comfort level or baseline comfort level  12/5/2024 1044 by Adarsh Mancilla RN  Outcome: Progressing  12/4/2024 2059 by Radha Huggins RN  Outcome: Progressing     Problem: Chronic Conditions and Co-morbidities  Goal: Patient's chronic conditions and co-morbidity symptoms are monitored and maintained or improved  12/5/2024 1044 by Adarsh Mancilla RN  Outcome: Progressing  12/4/2024 2059 by Radha Huggins RN  Outcome: Progressing  Flowsheets (Taken 12/4/2024 2000)  Care Plan - Patient's Chronic Conditions and Co-Morbidity Symptoms are Monitored and Maintained or Improved: Monitor and assess patient's chronic conditions and comorbid symptoms for stability, deterioration, or improvement     Problem: Safety - Adult  Goal: Free from fall injury  12/5/2024 1044 by Adarsh Mancilla RN  Outcome: Progressing  12/4/2024 2059 by Radha Huggins RN  Outcome: Progressing

## 2024-12-05 NOTE — PROGRESS NOTES
[facility]  Internal Medicine Teaching Residency Program  Inpatient Daily Progress Note  ______________________________________________________________________________    Patient: Anthony Jacobson Jr.  YOB: 1950   MRN:[id number]    Acct: [id number]     Room: OBS 11/11-1  Admit date: 12/2/2024  Today's date: 12/03/24  Number of days in the hospital: 1    SUBJECTIVE   Admitting Diagnosis: Symptomatic bradycardia  CC: Bradycardia  Pt examined at bedside. Chart & results reviewed.   Patient states that chest pain has resolved  Patient was alert and oriented to person place and time    Review of Systems   Constitutional:  Negative for chills and fever.   HENT:  Negative for congestion.    Respiratory:  Negative for shortness of breath.    Cardiovascular:  Negative for chest pain.   Gastrointestinal:  Negative for abdominal pain.   Genitourinary:  Negative for dysuria.   Neurological:  Negative for headaches.   Psychiatric/Behavioral:  Negative for confusion.        BRIEF HISTORY     The patient is a 74 y.o. male with past medical history of  CABGX 2 2/24/2012 LIMA-LAD VG-OM1(on aspirin, statin and beta-blocker)  Chronic diastolic heart failure(Farxiga 10 mg)  Type 2 diabetes mellitus(metformin 500 mg)  Primary hypertension(lisinopril 5 mg)     A patient presented with a complaint of chest pain that began earlier today. The pain is primarily localized to the epigastric region and radiates toward the subcostal area. The patient describes the pain as mild discomfort, rating it 3 out of 10. The patient denies chest pain, nausea, or vomiting. Additionally, the patient reported constipation, stating that he has not had a bowel movement for the past two days, with their normal bowel movement occurring daily. However, the patient is passing flatus.The patient has a history of an umbilical hernia but denies any pain at the site.  Vital signs in the emergency room were 
    [facility]  Internal Medicine Teaching Residency Program  Inpatient Daily Progress Note  ______________________________________________________________________________    Patient: Anthony Jacobson Jr.  YOB: 1950   MRN:[id number]    Acct: [id number]     Room: 0333/0333-05  Admit date: 12/2/2024  Today's date: 12/04/24  Number of days in the hospital: 2    SUBJECTIVE   Admitting Diagnosis: Symptomatic bradycardia  CC: Bradycardia  Pt examined at bedside. Chart & results reviewed.   Patient states that chest pain has resolved  Patient was alert and oriented to person place and time  Patient refuses to left heart cath and says want to follow-up with his own heart doctor    12/2/2024 Blood culture: No growth  12/3/2024 Urine culture: No growth    Echo 12/2/2024:EF of 55 - 60%.Diastolic dysfunction present with normal LV EF. Moderate tricuspid regurgitation. Severely elevated RVSP, consistent with severe pulmonary hypertension.   12/3/2024 Lexiscan: Stress test positive for myocardial ischemia    Plan:  -Due to abnormal stress test patient would likely benefit with cardiac cath as per cardio.Discussed with patient about cardiac catheterization  -Because of elevated blood glucose early in the morning will increase Lantus to 10 units.      Review of Systems   Constitutional:  Negative for chills and fever.   HENT:  Negative for congestion.    Respiratory:  Negative for shortness of breath.    Cardiovascular:  Negative for chest pain.   Gastrointestinal:  Negative for abdominal pain.   Genitourinary:  Negative for dysuria.   Neurological:  Negative for headaches.   Psychiatric/Behavioral:  Negative for confusion.        BRIEF HISTORY     The patient is a 74 y.o. male with past medical history of  CABGX 2 2/24/2012 LIMA-LAD VG-OM1(on aspirin, statin and beta-blocker)  Chronic diastolic heart failure(Farxiga 10 mg)  Type 2 diabetes mellitus(metformin 500 mg)  Primary 
  [facility]  Occupational Therapy Not Seen Note    DATE: 2024    NAME: Anthony Jacobson Jr.  MRN: [id number]   : 1950      Patient not seen this date for Occupational Therapy due to:    Testing: cardiac cath     Next Scheduled Treatment: check back 2024     Electronically signed by JORGE Burleson on 2024 at 1:32 PM    
Discharge instructions reviewed with patient and patient friend. Comments, questions, concerns addressed with patient. Ivs removed and patient with all belongings    
Notified primary team of /80. Hydralazine ordered. Will continue to monitor   
Occupational Therapy    [facility]  Occupational Therapy Not Seen Note    DATE: 12/3/2024    NAME: Anthony Jacobson Jr.  MRN: [id number]   : 1950      Patient not seen this date for Occupational Therapy due to:      Testing: Per RN, pt off the unit at this time for stress test      Next Scheduled Treatment: Will check back PM as able or 2024    Electronically signed by Savi Diallo OT on 12/3/2024 at 10:15 AM    
[facility]   Progress Note                   Date:   12/4/2024  Patient name: Anthony Jacobson Jr.  Date of admission:  12/2/2024  8:40 AM  MRN:   [id number]  YOB: 1950  PCP: Zaina Gonsalez MD    Reason for Admission: Bradycardia [R00.1]  Sinus bradycardia [R00.1]  Symptomatic bradycardia [R00.1]    Subjective:       Clinical Changes / Abnormalities: Pt seen and examined in the room.  Pt denies any CP or sob.  Labs, vitals and tele reviewed-  HR 74 currently.  Wife in room.  Pt is NPO     Medications:   Scheduled Meds:   insulin glargine  10 Units SubCUTAneous Nightly    carvedilol  3.125 mg Oral BID WC    sodium chloride flush  5-40 mL IntraVENous 2 times per day    enoxaparin  40 mg SubCUTAneous Daily    aspirin  81 mg Oral Daily    atorvastatin  40 mg Oral Daily    lisinopril  5 mg Oral Daily    insulin lispro  0-8 Units SubCUTAneous 4x Daily AC & HS     Continuous Infusions:   sodium chloride      dextrose       CBC:   Recent Labs     12/02/24  0853 12/03/24  0639 12/04/24  0722   WBC 13.8* 14.5* 15.2*   HGB 13.9 13.1 14.2    142 See Reflexed IPF Result     BMP:    Recent Labs     12/02/24  0853 12/03/24  0639 12/04/24  0722    137 136   K 3.9 3.8 3.6*    102 100   CO2 25 22 21   BUN 13 13 11   CREATININE 1.0 0.8 0.8   GLUCOSE 265* 173* 156*     Hepatic: No results for input(s): \"AST\", \"ALT\", \"BILITOT\", \"ALKPHOS\" in the last 72 hours.    Invalid input(s): \"ALB\"  Troponin:   Recent Labs     12/02/24  0956 12/02/24  1921 12/02/24  2043   TROPHS 26* 22 25*     BNP: No results for input(s): \"BNP\" in the last 72 hours.  Lipids: No results for input(s): \"CHOL\", \"HDL\" in the last 72 hours.    Invalid input(s): \"LDLCALCU\"  INR: No results for input(s): \"INR\" in the last 72 hours.    ECHO 12/2/24    Left Ventricle: Normal left ventricular systolic function with a visually estimated EF of 55 - 60%. Left ventricle size is normal. Increased wall thickness. Findings 
[facility]   Progress Note                   Date:   12/5/2024  Patient name: Anthony Jacobson Jr.  Date of admission:  12/2/2024  8:40 AM  MRN:   [id number]  YOB: 1950  PCP: Zaina Gonsalez MD    Reason for Admission: Bradycardia [R00.1]  Sinus bradycardia [R00.1]  Symptomatic bradycardia [R00.1]    Subjective:       Clinical Changes / Abnormalities: Pt seen and examined in the room.  Pt denies any CP or sob.  Labs, vitals and tele reviewed-  NSR currently.  Wife in room.     Medications:   Scheduled Meds:   insulin glargine  10 Units SubCUTAneous Nightly    ticagrelor  90 mg Oral BID    sodium chloride flush  5-40 mL IntraVENous 2 times per day    carvedilol  3.125 mg Oral BID WC    sodium chloride flush  5-40 mL IntraVENous 2 times per day    enoxaparin  40 mg SubCUTAneous Daily    aspirin  81 mg Oral Daily    atorvastatin  40 mg Oral Daily    lisinopril  5 mg Oral Daily    insulin lispro  0-8 Units SubCUTAneous 4x Daily AC & HS     Continuous Infusions:   sodium chloride      sodium chloride      dextrose       CBC:   Recent Labs     12/03/24  0639 12/04/24  0722 12/05/24  0607   WBC 14.5* 15.2* 17.6*   HGB 13.1 14.2 13.3    See Reflexed IPF Result 100*     BMP:    Recent Labs     12/03/24  0639 12/04/24  0722 12/05/24  0607    136 136   K 3.8 3.6* 3.5*    100 101   CO2 22 21 23   BUN 13 11 15   CREATININE 0.8 0.8 0.9   GLUCOSE 173* 156* 178*     Hepatic: No results for input(s): \"AST\", \"ALT\", \"BILITOT\", \"ALKPHOS\" in the last 72 hours.    Invalid input(s): \"ALB\"  Troponin:   Recent Labs     12/02/24  1921 12/02/24 2043   TROPHS 22 25*     BNP: No results for input(s): \"BNP\" in the last 72 hours.  Lipids: No results for input(s): \"CHOL\", \"HDL\" in the last 72 hours.    Invalid input(s): \"LDLCALCU\"  INR: No results for input(s): \"INR\" in the last 72 hours.    ECHO 12/2/24    Left Ventricle: Normal left ventricular systolic function with a visually estimated EF of 
Received post cardiac cath procedure to [facility] room 6. Assessment obtained. Restrictions reviewed with patient. Post procedure pathway initiated.  Right site soft , dressing dry and intact.  No hematoma noted.  Family at side.  Patient without complaints. Head of bed flat with right leg straight.   
Report called to Car 2 RN.  PAtient transported to room 2025.  Right groin assessed by Esteban ADDISON.  Groin soft, no bleeding or hematoma noted.  Pedal pulses palpable  
Spiritual Health History and Assessment/Progress Note  [facility]    Initial Encounter,    Name: Anthony Jacobson Jr. MRN: [id number]    Age: 74 y.o.     Sex: male   Language: English   Religious: Eastern Rastafari   Symptomatic bradycardia     Date: 12/3/2024            Total Time Calculated: (P) 17 min              Spiritual Assessment began in [facility] OBSERVATION UNIT        Referral/Consult From: Other    Encounter Overview/Reason: Initial Encounter  Service Provided For: Patient and family together    Narrative:  visited patient per family referral.  is familiar with patient, as he is the family member of a former hospital .  had also provided support throughout the multiple hospital stays of patient's recently  spouse. Patient arrived to the hospital due to \"Chest Pressure.\" Patient was sitting up in hospital bed, with son, Giuseppe, present in room, at time of 's visit. Per patient, he is \"feeling much better.\" Patient shared that he will be undergoing an \"Echocardiogram\" tomorrow. Patient and family were coping well and receptive of 's visit.     Paula, Belief, Meaning:   Patient has beliefs or practices that help with coping during difficult times  Family/Friends have beliefs or practices that help with coping during difficult times      Importance and Influence:  Patient has spiritual/personal beliefs that influence decisions regarding their health  Family/Friends have spiritual/personal beliefs that influence decisions regarding the patient's health    Community:  Patient feels well-supported. Support system includes: Children and [place] Community  Family/Friends feel well-supported. Support system includes: [place] and Extended family    Assessment and Plan of Care:     Patient Interventions include: Facilitated expression of thoughts and feelings and Affirmed coping skills/support systems  Family/Friends Interventions 
concerning for bradycardia, with a heart rate as low as 40 bpm. However, the patient denied lightheadedness, dizziness, presyncope, or syncope and did not report shortness of breath.  The patient has a prior history of bradycardia, with a dose reduction of Lopressor from 50 mg twice daily to 25 mg twice daily on 10/12/2021. An ECG showed sinus bradycardia with a ventricular rate of 46 bpm, normal axis, and a mildly prolonged QTc interval of 456 ms. There were no significant ST segment changes.  Troponin levels were elevated at 28, but they downtrended to 26. Thyroid-stimulating hormone (TSH) was normal at 1.47. A chest x-ray was unremarkable.     12/3/2024: Stress test positive for ischemia, Lopressor switched to carvedilol, echo:EF of 55 - 60%.Diastolic dysfunction present with normal LV EF. Moderate tricuspid regurgitation. Severely elevated RVSP, consistent with severe pulmonary hypertension.   OBJECTIVE     Vital Signs:  BP (!) 140/76   Pulse 60   Temp 98.1 °F (36.7 °C) (Oral)   Resp 22   Ht 1.778 m (5' 10\")   Wt 96.2 kg (212 lb)   SpO2 94%   BMI 30.42 kg/m²     Temp (24hrs), Av.1 °F (36.7 °C), Min:97.8 °F (36.6 °C), Max:98.2 °F (36.8 °C)    In: -   Out: 810 [Urine:800]      Physical Exam  HENT:      Head: Normocephalic and atraumatic.      Mouth/Throat:      Pharynx: Oropharynx is clear.   Cardiovascular:      Rate and Rhythm: Normal rate and regular rhythm.      Pulses: Normal pulses.   Pulmonary:      Effort: No respiratory distress.      Breath sounds: Normal breath sounds.   Abdominal:      General: Abdomen is flat. There is no distension.      Palpations: Abdomen is soft.   Musculoskeletal:      Right lower leg: No edema.      Left lower leg: No edema.   Skin:     General: Skin is warm.      Capillary Refill: Capillary refill takes less than 2 seconds.   Neurological:      Mental Status: He is alert and oriented to person, place, and time.           Medications:  Scheduled Medications:

## 2024-12-05 NOTE — PLAN OF CARE
Problem: Discharge Planning  Goal: Discharge to home or other facility with appropriate resources  12/4/2024 2059 by Radha Huggins RN  Outcome: Progressing  Flowsheets (Taken 12/4/2024 2000)  Discharge to home or other facility with appropriate resources: Identify barriers to discharge with patient and caregiver  12/4/2024 1848 by Adarsh Mancilla RN  Outcome: Progressing  Flowsheets (Taken 12/4/2024 0751 by Keena Baptiste, RN)  Discharge to home or other facility with appropriate resources:   Identify barriers to discharge with patient and caregiver   Arrange for needed discharge resources and transportation as appropriate   Identify discharge learning needs (meds, wound care, etc)     Problem: Pain  Goal: Verbalizes/displays adequate comfort level or baseline comfort level  12/4/2024 2059 by Radha Huggins RN  Outcome: Progressing  12/4/2024 1848 by Adarsh Mancilla RN  Outcome: Progressing  Flowsheets (Taken 12/4/2024 0745 by Keena Baptiste RN)  Verbalizes/displays adequate comfort level or baseline comfort level:   Encourage patient to monitor pain and request assistance   Assess pain using appropriate pain scale   Administer analgesics based on type and severity of pain and evaluate response   Implement non-pharmacological measures as appropriate and evaluate response     Problem: Chronic Conditions and Co-morbidities  Goal: Patient's chronic conditions and co-morbidity symptoms are monitored and maintained or improved  12/4/2024 2059 by Radha Huggins RN  Outcome: Progressing  Flowsheets (Taken 12/4/2024 2000)  Care Plan - Patient's Chronic Conditions and Co-Morbidity Symptoms are Monitored and Maintained or Improved: Monitor and assess patient's chronic conditions and comorbid symptoms for stability, deterioration, or improvement  12/4/2024 1848 by Adarsh Mancilla RN  Outcome: Progressing  Flowsheets (Taken 12/4/2024 0751 by Keena Baptiste RN)  Care Plan - Patient's Chronic Conditions and

## 2024-12-05 NOTE — CARE COORDINATION
Discharge Report    [facility]  Clinical Case Management Department  Written by: Estefani Ornelas RN    Patient Name: Anthony Jacobson   Attending Provider: Lukas Vasquez*  Admit Date: 2024  8:40 AM  MRN: [id number]  Account: [id number]                     : 1950  Discharge Date:       Disposition: home    Estefani Ornelas RN    
MEDICARE ADVANTAGE HMO / Product Type: Medicare /     Does insurance require precert for SNF: Yes    Potential assistance Purchasing Medications: No  Meds-to-Beds request:        [id number] - [place], [place] - [address] - P [phone] - F [phone]  [address]  [place] [address]  Phone: [phone] Fax: [phone]    [facility] - [place], [place] - [address] - P [phone] - F [phone]  [address]  [place] [address]  Phone: [phone] Fax: [phone]      Notes:    Factors facilitating achievement of predicted outcomes: Family support    Barriers to discharge: Medical complications    Additional Case Management Notes: home with son    The Plan for Transition of Care is related to the following treatment goals of Bradycardia [R00.1]  Sinus bradycardia [R00.1]    IF APPLICABLE: The Patient and/or patient representative Anthony and his family were provided with a choice of provider and agrees with the discharge plan. Freedom of choice list with basic dialogue that supports the patient's individualized plan of care/goals and shares the quality data associated with the providers was provided to:     Patient Representative Name:       The Patient and/or Patient Representative Agree with the Discharge Plan?      JOSE ANNA RN  Case Management Department  Ph: [phone] Fax:

## 2024-12-06 ENCOUNTER — CARE COORDINATION (OUTPATIENT)
Dept: CARE COORDINATION | Age: 74
End: 2024-12-06

## 2024-12-06 ENCOUNTER — TELEPHONE (OUTPATIENT)
Dept: FAMILY MEDICINE CLINIC | Age: 74
End: 2024-12-06

## 2024-12-06 NOTE — DISCHARGE SUMMARY
[facility]     Department of Internal Medicine - Staff Internal Medicine Teaching Service    INPATIENT DISCHARGE SUMMARY      Patient Identification:  Anthony Jacobson Jr. is a 74 y.o. male.  :  1950  MRN: [id number]     Acct: [id number]   PCP: Zaina Gonsalez MD  Admit Date:  2024  Discharge date and time: 2024  2:50 PM   Attending Provider: No att. providers found                                     ACTIVE DISCHARGE DIAGNOSES     Hospital Problem Lists:  Principal Problem:    Symptomatic bradycardia  Active Problems:    Type 2 diabetes mellitus without complication, without long-term current use of insulin (HCC)    Sinus bradycardia    Chest pain    Hyperglycemia    Leukocytosis    Abnormal cardiac function test  Resolved Problems:    * No resolved hospital problems. *      HOSPITAL STAY     Brief Inpatient course:   (present tense wording is of findings from admission exam and is not necessarily indicative of current findings)  The patient is a 74 y.o. male with past medical history of  CABGX 2 2012 LIMA-LAD VG-OM1(on aspirin, statin and beta-blocker)  Chronic diastolic heart failure(Farxiga 10 mg)  Type 2 diabetes mellitus(metformin 500 mg)  Primary hypertension(lisinopril 5 mg)     A patient presented with a complaint of chest pain that began earlier today. The pain is primarily localized to the epigastric region and radiates toward the subcostal area. The patient describes the pain as mild discomfort, rating it 3 out of 10. The patient denies chest pain, nausea, or vomiting. Additionally, the patient reported constipation, stating that he has not had a bowel movement for the past two days, with their normal bowel movement occurring daily. However, the patient is passing flatus.The patient has a history of an umbilical hernia but denies any pain at the site.  Vital signs in the emergency room were concerning for bradycardia, with a heart rate as low as 40

## 2024-12-06 NOTE — CARE COORDINATION
Care Transitions Note    Initial Call - Call within 2 business days of discharge: Yes    Attempted to reach patient for transitions of care follow up. Unable to reach patient.    Outreach Attempts:   Multiple attempts to contact patient at phone numbers on file.     Patient: Anthony Jacboson Jr.    Patient : 1950   MRN: [id number]    Reason for Admission: Symptomatic Bradycardia  Discharge Date: 24  RURS: Readmission Risk Score: 11.7      Attempted to reach patient, message left.  TC to [facility], patient did  Brillinta, Coreg is ready for  and Farxiga needs prior authorization.  Brillinta was at no OOP to patient.   Last Discharge Facility       Date Complaint Diagnosis Description Type Department Provider    24 Chest Pain Symptomatic bradycardia ... ED to Hosp-Admission (Discharged) (ADMITTED) [facility] CAR 2 EarlLukas spencer Radha,...            Was this an external facility discharge? No    Follow Up Appointment:   Patient has hospital follow up appointment scheduled within 7 days of discharge.    Future Appointments         Provider Specialty Dept Phone    2024 8:00 AM Liliana Lombardo, APRN - CNP Primary Care [phone]    2025 8:30 AM Amanda Mccabe DPM Podiatry [phone]            Plan for follow-up on next business day.      Sharron Mg RN

## 2024-12-06 NOTE — TELEPHONE ENCOUNTER
Care Transitions Initial Follow Up Call    Outreach made within 2 business days of discharge: Yes    Patient: Geena Jacobson Jr. Patient : 1950   MRN: [id number]  Reason for Admission: stent surgery     Discharge Date: 24       Spoke with: geena    Discharge department/facility: [facility] 24    TCM Interactive Patient Contact:  Was patient able to fill all prescriptions: Yes  Was patient instructed to bring all medications to the follow-up visit: Yes  Is patient taking all medications as directed in the discharge summary? Yes  Does patient understand their discharge instructions: Yes  Does patient have questions or concerns that need addressed prior to 7-14 day follow up office visit: no    Additional needs identified to be addressed with provider               Scheduled appointment with PCP within 7-14 days    Follow Up  Future Appointments   Date Time Provider Department Center   2025  8:30 AM Amanda Mccabe DPM [facility]       Kaylen Orr MA

## 2024-12-07 LAB
MICROORGANISM SPEC CULT: NORMAL
MICROORGANISM SPEC CULT: NORMAL
SERVICE CMNT-IMP: NORMAL
SERVICE CMNT-IMP: NORMAL
SPECIMEN DESCRIPTION: NORMAL
SPECIMEN DESCRIPTION: NORMAL

## 2024-12-09 ENCOUNTER — CARE COORDINATION (OUTPATIENT)
Dept: CARE COORDINATION | Age: 74
End: 2024-12-09

## 2024-12-09 NOTE — CARE COORDINATION
Care Transitions Note    Initial Call - Call within 2 business days of discharge: Yes    Attempted to reach patient for transitions of care follow up. Unable to reach patient.    Outreach Attempts:   Multiple attempts to contact patient at phone numbers on file.     Patient: Anthony Jacobson Jr.    Patient : 1950   MRN: [id number]    Reason for Admission: Symptomatic bradycardia  Discharge Date: 24  RURS: Readmission Risk Score: 11.7    Last Discharge Facility       Date Complaint Diagnosis Description Type Department Provider    24 Chest Pain Symptomatic bradycardia ... ED to Hosp-Admission (Discharged) (ADMITTED) [facility] 2 Lukas Vasquez,...            Was this an external facility discharge? No    Follow Up Appointment:   Patient does not have a follow up appointment scheduled at time of call.  Unable to reach.   Future Appointments         Provider Specialty Dept Phone    2025 8:30 AM Amanda Mccabe DPM Podiatry [phone]            No further follow-up call indicated     Sharron Mg RN

## 2024-12-23 RX ORDER — ATORVASTATIN CALCIUM 40 MG/1
40 TABLET, FILM COATED ORAL DAILY
Qty: 30 TABLET | Refills: 0 | Status: SHIPPED | OUTPATIENT
Start: 2024-12-23

## 2024-12-23 NOTE — TELEPHONE ENCOUNTER
Anthony Jacobson Jr. is calling to request a refill on the following medication(s):    Medication Request:  Requested Prescriptions     Pending Prescriptions Disp Refills    atorvastatin (LIPITOR) 40 MG tablet [Pharmacy Med Name: ATORVASTATIN 40 MG TABLET] 90 tablet 1     Sig: TAKE 1 TABLET BY MOUTH DAILY       Last Visit Date (If Applicable):  7/19/2024    Next Visit Date:    Visit date not found    Last refilled on 6/24/24. Prescription pending.

## 2025-01-21 RX ORDER — ATORVASTATIN CALCIUM 40 MG/1
40 TABLET, FILM COATED ORAL DAILY
Qty: 30 TABLET | Refills: 0 | Status: SHIPPED | OUTPATIENT
Start: 2025-01-21

## 2025-01-21 NOTE — TELEPHONE ENCOUNTER
Anthony Jacobson Jr. is calling to request a refill on the following medication(s):    Medication Request:  Requested Prescriptions     Pending Prescriptions Disp Refills    atorvastatin (LIPITOR) 40 MG tablet [Pharmacy Med Name: ATORVASTATIN 40 MG TABLET] 30 tablet 0     Sig: TAKE 1 TABLET BY MOUTH DAILY       Last Visit Date (If Applicable):  7/19/2024    Next Visit Date:    Visit date not found    Last refilled on 12/23/24. Prescription pending.

## 2025-02-07 ENCOUNTER — OFFICE VISIT (OUTPATIENT)
Dept: FAMILY MEDICINE CLINIC | Age: 75
End: 2025-02-07

## 2025-02-07 VITALS
HEIGHT: 70 IN | HEART RATE: 55 BPM | OXYGEN SATURATION: 98 % | RESPIRATION RATE: 13 BRPM | SYSTOLIC BLOOD PRESSURE: 130 MMHG | BODY MASS INDEX: 30.29 KG/M2 | DIASTOLIC BLOOD PRESSURE: 64 MMHG | TEMPERATURE: 97.3 F | WEIGHT: 211.6 LBS

## 2025-02-07 DIAGNOSIS — N20.0 RIGHT KIDNEY STONE: ICD-10-CM

## 2025-02-07 DIAGNOSIS — Z95.1 H/O TWO VESSEL CORONARY ARTERY BYPASS GRAFT: ICD-10-CM

## 2025-02-07 DIAGNOSIS — I50.32 CHRONIC DIASTOLIC CONGESTIVE HEART FAILURE (HCC): ICD-10-CM

## 2025-02-07 DIAGNOSIS — E78.5 DYSLIPIDEMIA: ICD-10-CM

## 2025-02-07 DIAGNOSIS — Z23 NEED FOR INFLUENZA VACCINATION: ICD-10-CM

## 2025-02-07 DIAGNOSIS — I25.10 CORONARY ARTERY DISEASE INVOLVING NATIVE CORONARY ARTERY OF NATIVE HEART WITHOUT ANGINA PECTORIS: Primary | ICD-10-CM

## 2025-02-07 DIAGNOSIS — N40.1 BPH WITH OBSTRUCTION/LOWER URINARY TRACT SYMPTOMS: ICD-10-CM

## 2025-02-07 DIAGNOSIS — Z68.55 SEVERE OBESITY DUE TO EXCESS CALORIES WITH SERIOUS COMORBIDITY AND BODY MASS INDEX (BMI) 120% OF 95TH PERCENTILE TO LESS THAN 140% OF 95TH PERCENTILE FOR AGE IN PEDIATRIC PATIENT: ICD-10-CM

## 2025-02-07 DIAGNOSIS — H91.93 DECREASED HEARING OF BOTH EARS: ICD-10-CM

## 2025-02-07 DIAGNOSIS — I10 ESSENTIAL HYPERTENSION: ICD-10-CM

## 2025-02-07 DIAGNOSIS — E10.65 TYPE 1 DIABETES MELLITUS WITH HYPERGLYCEMIA (HCC): ICD-10-CM

## 2025-02-07 DIAGNOSIS — E66.01 SEVERE OBESITY DUE TO EXCESS CALORIES WITH SERIOUS COMORBIDITY AND BODY MASS INDEX (BMI) 120% OF 95TH PERCENTILE TO LESS THAN 140% OF 95TH PERCENTILE FOR AGE IN PEDIATRIC PATIENT: ICD-10-CM

## 2025-02-07 DIAGNOSIS — N13.8 BPH WITH OBSTRUCTION/LOWER URINARY TRACT SYMPTOMS: ICD-10-CM

## 2025-02-07 DIAGNOSIS — Z91.199 NON-COMPLIANCE: ICD-10-CM

## 2025-02-07 PROBLEM — E11.9 TYPE 2 DIABETES MELLITUS WITHOUT COMPLICATION, WITHOUT LONG-TERM CURRENT USE OF INSULIN (HCC): Status: RESOLVED | Noted: 2020-10-06 | Resolved: 2025-02-07

## 2025-02-07 PROBLEM — R73.9 HYPERGLYCEMIA: Status: RESOLVED | Noted: 2024-12-02 | Resolved: 2025-02-07

## 2025-02-07 PROBLEM — R94.30 ABNORMAL CARDIAC FUNCTION TEST: Status: RESOLVED | Noted: 2024-12-02 | Resolved: 2025-02-07

## 2025-02-07 PROBLEM — D72.829 LEUKOCYTOSIS: Status: RESOLVED | Noted: 2024-12-02 | Resolved: 2025-02-07

## 2025-02-07 PROBLEM — R07.9 CHEST PAIN: Status: RESOLVED | Noted: 2024-12-02 | Resolved: 2025-02-07

## 2025-02-07 PROBLEM — R00.1 SINUS BRADYCARDIA: Status: RESOLVED | Noted: 2024-12-02 | Resolved: 2025-02-07

## 2025-02-07 PROBLEM — R00.1 SYMPTOMATIC BRADYCARDIA: Status: RESOLVED | Noted: 2024-12-02 | Resolved: 2025-02-07

## 2025-02-07 RX ORDER — INSULIN GLARGINE 100 [IU]/ML
15 INJECTION, SOLUTION SUBCUTANEOUS NIGHTLY
Qty: 5 ADJUSTABLE DOSE PRE-FILLED PEN SYRINGE | Refills: 3 | Status: SHIPPED | OUTPATIENT
Start: 2025-02-07

## 2025-02-07 ASSESSMENT — ENCOUNTER SYMPTOMS
GASTROINTESTINAL NEGATIVE: 1
BACK PAIN: 1
ALLERGIC/IMMUNOLOGIC NEGATIVE: 1
RESPIRATORY NEGATIVE: 1
EYES NEGATIVE: 1

## 2025-02-07 ASSESSMENT — PATIENT HEALTH QUESTIONNAIRE - PHQ9
SUM OF ALL RESPONSES TO PHQ QUESTIONS 1-9: 0
SUM OF ALL RESPONSES TO PHQ9 QUESTIONS 1 & 2: 0
1. LITTLE INTEREST OR PLEASURE IN DOING THINGS: NOT AT ALL
2. FEELING DOWN, DEPRESSED OR HOPELESS: NOT AT ALL
SUM OF ALL RESPONSES TO PHQ QUESTIONS 1-9: 0

## 2025-02-07 NOTE — PROGRESS NOTES
congestive heart failure (HCC)        5. Essential hypertension        6. BPH with obstruction/lower urinary tract symptoms        7. Decreased hearing of both ears        8. Right kidney stone        9. Type 1 diabetes mellitus with hyperglycemia (HCC)  External Referral To Endocrinology      10. H/O two vessel coronary artery bypass graft        11. Non-compliance        12. Need for influenza vaccination  Influenza, FLUAD Trivalent, (age 65 y+), IM, Preservative Free, 0.5mL            Objective:   Physical Exam  Vitals and nursing note reviewed.   Constitutional:       Appearance: He is well-developed.   HENT:      Head: Normocephalic and atraumatic.      Right Ear: External ear normal.      Left Ear: External ear normal.      Ears:      Comments: Hearing impaired     Nose: Nose normal.   Eyes:      Conjunctiva/sclera: Conjunctivae normal.      Pupils: Pupils are equal, round, and reactive to light.   Cardiovascular:      Rate and Rhythm: Normal rate and regular rhythm.      Heart sounds: Normal heart sounds.      Comments: Coronary artery disease status post CABG  Hypertension  Hyperlipidemia  Uncontrolled diabetes mellitus  Pulmonary:      Effort: Pulmonary effort is normal.      Breath sounds: Normal breath sounds.   Abdominal:      General: Bowel sounds are normal.      Palpations: Abdomen is soft.   Musculoskeletal:         General: Normal range of motion.      Cervical back: Normal range of motion and neck supple.      Comments: Degenerative polyarthralgia   Skin:     General: Skin is warm and dry.   Neurological:      Mental Status: He is alert and oriented to person, place, and time.      Deep Tendon Reflexes: Reflexes are normal and symmetric.   Psychiatric:      Comments: Noncompliance with diet         Assessment:      Diagnosis Orders   1. Coronary artery disease involving native coronary artery of native heart without angina pectoris        2. Dyslipidemia        3. Severe obesity due to excess

## 2025-02-09 RX ORDER — ACYCLOVIR 800 MG/1
1 TABLET ORAL
Qty: 3 EACH | Refills: 1 | Status: SHIPPED | OUTPATIENT
Start: 2025-02-09

## 2025-02-17 ENCOUNTER — OFFICE VISIT (OUTPATIENT)
Dept: PODIATRY | Age: 75
End: 2025-02-17
Payer: COMMERCIAL

## 2025-02-17 VITALS — BODY MASS INDEX: 30.21 KG/M2 | WEIGHT: 211 LBS | HEIGHT: 70 IN

## 2025-02-17 DIAGNOSIS — R60.0 EDEMA OF LOWER EXTREMITY: ICD-10-CM

## 2025-02-17 DIAGNOSIS — B35.1 DERMATOPHYTOSIS OF NAIL: ICD-10-CM

## 2025-02-17 DIAGNOSIS — I73.9 PVD (PERIPHERAL VASCULAR DISEASE): ICD-10-CM

## 2025-02-17 DIAGNOSIS — L85.3 XEROSIS CUTIS: ICD-10-CM

## 2025-02-17 DIAGNOSIS — M79.604 PAIN IN BOTH LOWER EXTREMITIES: ICD-10-CM

## 2025-02-17 DIAGNOSIS — M79.605 PAIN IN BOTH LOWER EXTREMITIES: ICD-10-CM

## 2025-02-17 DIAGNOSIS — E11.9 TYPE 2 DIABETES MELLITUS WITHOUT COMPLICATION, WITHOUT LONG-TERM CURRENT USE OF INSULIN (HCC): Primary | ICD-10-CM

## 2025-02-17 PROCEDURE — 1159F MED LIST DOCD IN RCRD: CPT | Performed by: PODIATRIST

## 2025-02-17 PROCEDURE — 99213 OFFICE O/P EST LOW 20 MIN: CPT | Performed by: PODIATRIST

## 2025-02-17 PROCEDURE — 11721 DEBRIDE NAIL 6 OR MORE: CPT | Performed by: PODIATRIST

## 2025-02-17 PROCEDURE — 1123F ACP DISCUSS/DSCN MKR DOCD: CPT | Performed by: PODIATRIST

## 2025-02-17 PROCEDURE — 1126F AMNT PAIN NOTED NONE PRSNT: CPT | Performed by: PODIATRIST

## 2025-02-17 NOTE — PROGRESS NOTES
Continuous Glucose Sensor (FREESTYLE VIN 3 SENSOR) MISC 1 each by Does not apply route every 14 days 3 each 1    insulin glargine (BASAGLAR KWIKPEN) 100 UNIT/ML injection pen Inject 15 Units into the skin nightly 5 Adjustable Dose Pre-filled Pen Syringe 3    atorvastatin (LIPITOR) 40 MG tablet TAKE 1 TABLET BY MOUTH DAILY 30 tablet 0    dapagliflozin (FARXIGA) 10 MG tablet Take 1 tablet by mouth every morning 30 tablet 2    carvedilol (COREG) 3.125 MG tablet Take 1 tablet by mouth 2 times daily (with meals) 60 tablet 2    ticagrelor (BRILINTA) 90 MG TABS tablet Take 1 tablet by mouth 2 times daily 60 tablet 2    lisinopril (PRINIVIL;ZESTRIL) 5 MG tablet TAKE 1 TABLET BY MOUTH DAILY 90 tablet 0    metFORMIN (GLUCOPHAGE) 1000 MG tablet TAKE 1 TABLET BY MOUTH TWICE A DAY WITH A MEAL 180 tablet 1    Insulin Pen Needle 32G X 4 MM MISC 1 each by Does not apply route daily 100 each 3    aspirin (ASPIRIN LOW DOSE) 81 MG chewable tablet CHEW ONE TABLET BY MOUTH DAILY 200 tablet 1     No current facility-administered medications on file prior to visit.       Review of Systems    Review of Systems:   History obtained from chart review and the patient  General ROS: negative for - chills, fatigue, fever, night sweats or weight gain  Constitutional: Negative for chills, diaphoresis, fatigue, fever and unexpected weight change.  Musculoskeletal: Positive for arthralgias, gait problem and joint swelling.  Neurological ROS: negative for - behavioral changes, confusion, headaches or seizures. Negative for weakness and numbness.   Dermatological ROS: negative for - mole changes, rash  Cardiovascular: Negative for leg swelling.   Gastrointestinal: Negative for constipation, diarrhea, nausea and vomiting.        Objective:  Dermatologic Exam:  Skin lesion/ulceration Absent .   Skin No rashes or nodules noted..   Skin is thin, with flaky sloughing skin as well as decreased hair growth to the lower leg  Small red hemosiderin deposits

## 2025-02-18 RX ORDER — ATORVASTATIN CALCIUM 40 MG/1
40 TABLET, FILM COATED ORAL DAILY
Qty: 90 TABLET | Refills: 1 | Status: SHIPPED | OUTPATIENT
Start: 2025-02-18

## 2025-02-18 NOTE — TELEPHONE ENCOUNTER
Anthony Jacobson Jr. is calling to request a refill on the following medication(s):    Medication Request:  Requested Prescriptions     Pending Prescriptions Disp Refills    atorvastatin (LIPITOR) 40 MG tablet [Pharmacy Med Name: ATORVASTATIN 40 MG TABLET] 30 tablet 0     Sig: TAKE 1 TABLET BY MOUTH DAILY       Last Visit Date (If Applicable):  2/7/2025    Next Visit Date:    5/12/2025

## 2025-02-19 ENCOUNTER — APPOINTMENT (OUTPATIENT)
Dept: INTERVENTIONAL RADIOLOGY/VASCULAR | Age: 75
DRG: 023 | End: 2025-02-19
Payer: COMMERCIAL

## 2025-02-19 ENCOUNTER — ANESTHESIA EVENT (OUTPATIENT)
Dept: INTERVENTIONAL RADIOLOGY/VASCULAR | Age: 75
DRG: 023 | End: 2025-02-19
Payer: COMMERCIAL

## 2025-02-19 ENCOUNTER — APPOINTMENT (OUTPATIENT)
Dept: CT IMAGING | Age: 75
DRG: 023 | End: 2025-02-19
Payer: COMMERCIAL

## 2025-02-19 ENCOUNTER — ANESTHESIA (OUTPATIENT)
Dept: INTERVENTIONAL RADIOLOGY/VASCULAR | Age: 75
DRG: 023 | End: 2025-02-19
Payer: COMMERCIAL

## 2025-02-19 ENCOUNTER — HOSPITAL ENCOUNTER (INPATIENT)
Age: 75
LOS: 6 days | Discharge: HOSPICE | DRG: 023 | End: 2025-02-25
Attending: EMERGENCY MEDICINE | Admitting: PSYCHIATRY & NEUROLOGY
Payer: COMMERCIAL

## 2025-02-19 ENCOUNTER — APPOINTMENT (OUTPATIENT)
Dept: GENERAL RADIOLOGY | Age: 75
DRG: 023 | End: 2025-02-19
Payer: COMMERCIAL

## 2025-02-19 ENCOUNTER — APPOINTMENT (OUTPATIENT)
Dept: MRI IMAGING | Age: 75
DRG: 023 | End: 2025-02-19
Payer: COMMERCIAL

## 2025-02-19 DIAGNOSIS — I63.9 CEREBROVASCULAR ACCIDENT (CVA), UNSPECIFIED MECHANISM (HCC): ICD-10-CM
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
## 2025-02-19 DIAGNOSIS — I63.512 ACUTE ISCHEMIC LEFT MIDDLE CEREBRAL ARTERY (MCA) STROKE (HCC): ICD-10-CM

## 2025-02-19 PROBLEM — I65.22 INTERNAL CAROTID ARTERY OCCLUSION, LEFT: Status: ACTIVE | Noted: 2025-02-19

## 2025-02-19 LAB
25(OH)D3 SERPL-MCNC: 11.3 NG/ML (ref 30–100)
ABO + RH BLD: NORMAL
ACT BLD: 109 SEC (ref 79–149)
ALBUMIN SERPL-MCNC: 3.9 G/DL (ref 3.5–5.2)
ALBUMIN/GLOB SERPL: 1.4 {RATIO} (ref 1–2.5)
ALP SERPL-CCNC: 73 U/L (ref 40–129)
ALT SERPL-CCNC: 19 U/L (ref 10–50)
ANION GAP SERPL CALCULATED.3IONS-SCNC: 14 MMOL/L (ref 9–16)
ARM BAND NUMBER: NORMAL
AST SERPL-CCNC: 21 U/L (ref 10–50)
BILIRUB DIRECT SERPL-MCNC: 0.4 MG/DL (ref 0–0.2)
BILIRUB INDIRECT SERPL-MCNC: 0.3 MG/DL (ref 0–1)
BILIRUB SERPL-MCNC: 0.7 MG/DL (ref 0–1.2)
BLOOD BANK SAMPLE EXPIRATION: NORMAL
BLOOD BANK SPECIMEN: ABNORMAL
BLOOD GROUP ANTIBODIES SERPL: NEGATIVE
BODY TEMPERATURE: 37
BUN SERPL-MCNC: 10 MG/DL (ref 8–23)
CHLORIDE SERPL-SCNC: 102 MMOL/L (ref 98–107)
CK SERPL-CCNC: 65 U/L (ref 39–308)
CO2 SERPL-SCNC: 21 MMOL/L (ref 20–31)
COHGB MFR BLD: 0.5 % (ref 0–5)
CREAT SERPL-MCNC: 0.9 MG/DL (ref 0.7–1.2)
ERYTHROCYTE [DISTWIDTH] IN BLOOD BY AUTOMATED COUNT: 14.6 % (ref 11.8–14.4)
EST. AVERAGE GLUCOSE BLD GHB EST-MCNC: 289 MG/DL
ETHANOL PERCENT: <0.01 %
ETHANOLAMINE SERPL-MCNC: <10 MG/DL (ref 0–0.08)
FIBRINOGEN, FUNCTIONAL TEG: 20.7 MM (ref 15–32)
FIO2 ON VENT: ABNORMAL %
FIO2: 40
GFR, ESTIMATED: 58 ML/MIN/1.73M2
GLOBULIN SER CALC-MCNC: 2.7 G/DL
GLUCOSE BLD-MCNC: 216 MG/DL (ref 75–110)
GLUCOSE BLD-MCNC: 234 MG/DL (ref 75–110)
GLUCOSE SERPL-MCNC: 231 MG/DL (ref 74–99)
HBA1C MFR BLD: 11.7 % (ref 4–6)
HCO3 VENOUS: 24.6 MMOL/L (ref 24–30)
HCT VFR BLD AUTO: 41.3 % (ref 40.7–50.3)
HGB BLD-MCNC: 13.2 G/DL (ref 13–17)
INR PPP: 1.1
LIPASE SERPL-CCNC: 115 U/L (ref 13–60)
LY30 (LYSIS) TEG: 0.4 % (ref 0–2.6)
MA(MAX CLOT) RAPID TEG: 64.4 MM (ref 52–70)
MCH RBC QN AUTO: 26.7 PG (ref 25.2–33.5)
MCHC RBC AUTO-ENTMCNC: 32 G/DL (ref 28.4–34.8)
MCV RBC AUTO: 83.4 FL (ref 82.6–102.9)
MODE: AC
MYOGLOBIN SERPL-MCNC: 122 NG/ML (ref 28–72)
NEGATIVE BASE EXCESS, ART: 0.7 MMOL/L (ref 0–2)
NEGATIVE BASE EXCESS, VEN: 1.8 MMOL/L (ref 0–2)
NRBC BLD-RTO: 0 PER 100 WBC
O2 DELIVERY DEVICE: NORMAL
O2 SAT, VEN: 42.6 % (ref 60–85)
PARTIAL THROMBOPLASTIN TIME: 23.5 SEC (ref 23–36.5)
PCO2 VENOUS: 47.9 MM HG (ref 39–55)
PERFORMING LOCATION: ABNORMAL
PH VENOUS: 7.33 (ref 7.32–7.42)
PLATELET # BLD AUTO: 291 K/UL (ref 138–453)
PMV BLD AUTO: 9.9 FL (ref 8.1–13.5)
PO2 VENOUS: 25.8 MM HG (ref 30–50)
POC HCO3: 23.2 MMOL/L (ref 21–28)
POC O2 SATURATION: 96.8 % (ref 94–98)
POC PCO2: 35.2 MM HG (ref 35–48)
POC PH: 7.43 (ref 7.35–7.45)
POC PO2: 85.4 MM HG (ref 83–108)
POTASSIUM SERPL-SCNC: 4.1 MMOL/L (ref 3.7–5.3)
PROT SERPL-MCNC: 6.6 G/DL (ref 6.6–8.7)
PROTHROMBIN TIME: 13.9 SEC (ref 11.7–14.9)
RBC # BLD AUTO: 4.95 M/UL (ref 4.21–5.77)
REACTION TIME TEG: 4.1 MIN (ref 4.6–9.1)
SAMPLE SITE: NORMAL
SODIUM SERPL-SCNC: 137 MMOL/L (ref 136–145)
T4 FREE SERPL-MCNC: 1.1 NG/DL (ref 0.92–1.68)
TROPONIN I SERPL HS-MCNC: 23 NG/L (ref 0–22)
TROPONIN I SERPL HS-MCNC: 30 NG/L (ref 0–22)
TROPONIN I SERPL HS-MCNC: 31 NG/L (ref 0–22)
TSH SERPL DL<=0.05 MIU/L-ACNC: 2.91 UIU/ML (ref 0.27–4.2)
VIT B12 SERPL-MCNC: 271 PG/ML (ref 232–1245)
WBC OTHER # BLD: 11.2 K/UL (ref 3.5–11.3)

## 2025-02-19 PROCEDURE — 2580000003 HC RX 258

## 2025-02-19 PROCEDURE — 96367 TX/PROPH/DG ADDL SEQ IV INF: CPT

## 2025-02-19 PROCEDURE — 70498 CT ANGIOGRAPHY NECK: CPT

## 2025-02-19 PROCEDURE — 6810039001 HC L1 TRAUMA PRIORITY

## 2025-02-19 PROCEDURE — 6370000000 HC RX 637 (ALT 250 FOR IP)

## 2025-02-19 PROCEDURE — 2500000003 HC RX 250 WO HCPCS: Performed by: PSYCHIATRY & NEUROLOGY

## 2025-02-19 PROCEDURE — 3700000001 HC ADD 15 MINUTES (ANESTHESIA)

## 2025-02-19 PROCEDURE — 85347 COAGULATION TIME ACTIVATED: CPT

## 2025-02-19 PROCEDURE — G0480 DRUG TEST DEF 1-7 CLASSES: HCPCS

## 2025-02-19 PROCEDURE — 82306 VITAMIN D 25 HYDROXY: CPT

## 2025-02-19 PROCEDURE — 93005 ELECTROCARDIOGRAM TRACING: CPT | Performed by: STUDENT IN AN ORGANIZED HEALTH CARE EDUCATION/TRAINING PROGRAM

## 2025-02-19 PROCEDURE — 99285 EMERGENCY DEPT VISIT HI MDM: CPT

## 2025-02-19 PROCEDURE — 85390 FIBRINOLYSINS SCREEN I&R: CPT

## 2025-02-19 PROCEDURE — 85576 BLOOD PLATELET AGGREGATION: CPT

## 2025-02-19 PROCEDURE — 61645 PERQ ART M-THROMBECT &/NFS: CPT

## 2025-02-19 PROCEDURE — 80051 ELECTROLYTE PANEL: CPT

## 2025-02-19 PROCEDURE — 94002 VENT MGMT INPAT INIT DAY: CPT

## 2025-02-19 PROCEDURE — 85730 THROMBOPLASTIN TIME PARTIAL: CPT

## 2025-02-19 PROCEDURE — 82805 BLOOD GASES W/O2 SATURATION: CPT

## 2025-02-19 PROCEDURE — 5A1955Z RESPIRATORY VENTILATION, GREATER THAN 96 CONSECUTIVE HOURS: ICD-10-PCS | Performed by: PSYCHIATRY & NEUROLOGY

## 2025-02-19 PROCEDURE — 70450 CT HEAD/BRAIN W/O DYE: CPT

## 2025-02-19 PROCEDURE — 80076 HEPATIC FUNCTION PANEL: CPT

## 2025-02-19 PROCEDURE — 72125 CT NECK SPINE W/O DYE: CPT

## 2025-02-19 PROCEDURE — 6360000002 HC RX W HCPCS

## 2025-02-19 PROCEDURE — 2500000003 HC RX 250 WO HCPCS: Performed by: STUDENT IN AN ORGANIZED HEALTH CARE EDUCATION/TRAINING PROGRAM

## 2025-02-19 PROCEDURE — 85027 COMPLETE CBC AUTOMATED: CPT

## 2025-02-19 PROCEDURE — 85610 PROTHROMBIN TIME: CPT

## 2025-02-19 PROCEDURE — 6360000004 HC RX CONTRAST MEDICATION

## 2025-02-19 PROCEDURE — 82803 BLOOD GASES ANY COMBINATION: CPT

## 2025-02-19 PROCEDURE — 37799 UNLISTED PX VASCULAR SURGERY: CPT

## 2025-02-19 PROCEDURE — 82607 VITAMIN B-12: CPT

## 2025-02-19 PROCEDURE — 84520 ASSAY OF UREA NITROGEN: CPT

## 2025-02-19 PROCEDURE — 2500000003 HC RX 250 WO HCPCS

## 2025-02-19 PROCEDURE — 3700000000 HC ANESTHESIA ATTENDED CARE

## 2025-02-19 PROCEDURE — 0BH17EZ INSERTION OF ENDOTRACHEAL AIRWAY INTO TRACHEA, VIA NATURAL OR ARTIFICIAL OPENING: ICD-10-PCS | Performed by: PSYCHIATRY & NEUROLOGY

## 2025-02-19 PROCEDURE — 82550 ASSAY OF CK (CPK): CPT

## 2025-02-19 PROCEDURE — 03CG3Z7 EXTIRPATION OF MATTER FROM INTRACRANIAL ARTERY USING STENT RETRIEVER, PERCUTANEOUS APPROACH: ICD-10-PCS | Performed by: PSYCHIATRY & NEUROLOGY

## 2025-02-19 PROCEDURE — 71260 CT THORAX DX C+: CPT

## 2025-02-19 PROCEDURE — 83874 ASSAY OF MYOGLOBIN: CPT

## 2025-02-19 PROCEDURE — 86900 BLOOD TYPING SEROLOGIC ABO: CPT

## 2025-02-19 PROCEDURE — 73070 X-RAY EXAM OF ELBOW: CPT

## 2025-02-19 PROCEDURE — 99222 1ST HOSP IP/OBS MODERATE 55: CPT | Performed by: SURGERY

## 2025-02-19 PROCEDURE — 6360000004 HC RX CONTRAST MEDICATION: Performed by: PSYCHIATRY & NEUROLOGY

## 2025-02-19 PROCEDURE — 83036 HEMOGLOBIN GLYCOSYLATED A1C: CPT

## 2025-02-19 PROCEDURE — 86850 RBC ANTIBODY SCREEN: CPT

## 2025-02-19 PROCEDURE — 83690 ASSAY OF LIPASE: CPT

## 2025-02-19 PROCEDURE — 6360000004 HC RX CONTRAST MEDICATION: Performed by: STUDENT IN AN ORGANIZED HEALTH CARE EDUCATION/TRAINING PROGRAM

## 2025-02-19 PROCEDURE — 61645 PERQ ART M-THROMBECT &/NFS: CPT | Performed by: PSYCHIATRY & NEUROLOGY

## 2025-02-19 PROCEDURE — 82565 ASSAY OF CREATININE: CPT

## 2025-02-19 PROCEDURE — 82947 ASSAY GLUCOSE BLOOD QUANT: CPT

## 2025-02-19 PROCEDURE — A9579 GAD-BASE MR CONTRAST NOS,1ML: HCPCS

## 2025-02-19 PROCEDURE — 73090 X-RAY EXAM OF FOREARM: CPT

## 2025-02-19 PROCEDURE — 99291 CRITICAL CARE FIRST HOUR: CPT | Performed by: PSYCHIATRY & NEUROLOGY

## 2025-02-19 PROCEDURE — 0HQDXZZ REPAIR RIGHT LOWER ARM SKIN, EXTERNAL APPROACH: ICD-10-PCS | Performed by: SURGERY

## 2025-02-19 PROCEDURE — 2000000000 HC ICU R&B

## 2025-02-19 PROCEDURE — 86901 BLOOD TYPING SEROLOGIC RH(D): CPT

## 2025-02-19 PROCEDURE — 70553 MRI BRAIN STEM W/O & W/DYE: CPT

## 2025-02-19 PROCEDURE — 84443 ASSAY THYROID STIM HORMONE: CPT

## 2025-02-19 PROCEDURE — 2700000000 HC OXYGEN THERAPY PER DAY

## 2025-02-19 PROCEDURE — 84439 ASSAY OF FREE THYROXINE: CPT

## 2025-02-19 PROCEDURE — 71045 X-RAY EXAM CHEST 1 VIEW: CPT

## 2025-02-19 PROCEDURE — 0042T CT BRAIN PERFUSION: CPT

## 2025-02-19 PROCEDURE — 84703 CHORIONIC GONADOTROPIN ASSAY: CPT

## 2025-02-19 PROCEDURE — 76937 US GUIDE VASCULAR ACCESS: CPT

## 2025-02-19 PROCEDURE — 6360000002 HC RX W HCPCS: Performed by: STUDENT IN AN ORGANIZED HEALTH CARE EDUCATION/TRAINING PROGRAM

## 2025-02-19 PROCEDURE — 85384 FIBRINOGEN ACTIVITY: CPT

## 2025-02-19 PROCEDURE — B3171ZZ FLUOROSCOPY OF LEFT INTERNAL CAROTID ARTERY USING LOW OSMOLAR CONTRAST: ICD-10-PCS | Performed by: PSYCHIATRY & NEUROLOGY

## 2025-02-19 PROCEDURE — 94761 N-INVAS EAR/PLS OXIMETRY MLT: CPT

## 2025-02-19 PROCEDURE — 96365 THER/PROPH/DIAG IV INF INIT: CPT

## 2025-02-19 PROCEDURE — 02HV33Z INSERTION OF INFUSION DEVICE INTO SUPERIOR VENA CAVA, PERCUTANEOUS APPROACH: ICD-10-PCS | Performed by: PSYCHIATRY & NEUROLOGY

## 2025-02-19 PROCEDURE — 84484 ASSAY OF TROPONIN QUANT: CPT

## 2025-02-19 RX ORDER — HYDRALAZINE HYDROCHLORIDE 20 MG/ML
10 INJECTION INTRAMUSCULAR; INTRAVENOUS
Status: DISCONTINUED | OUTPATIENT
Start: 2025-02-19 | End: 2025-02-20

## 2025-02-19 RX ORDER — SUCCINYLCHOLINE/SOD CL,ISO/PF 100 MG/5ML
SYRINGE (ML) INTRAVENOUS
Status: DISCONTINUED | OUTPATIENT
Start: 2025-02-19 | End: 2025-02-19 | Stop reason: SDUPTHER

## 2025-02-19 RX ORDER — SODIUM CHLORIDE 9 MG/ML
INJECTION, SOLUTION INTRAVENOUS PRN
Status: DISCONTINUED | OUTPATIENT
Start: 2025-02-19 | End: 2025-02-25 | Stop reason: HOSPADM

## 2025-02-19 RX ORDER — ROCURONIUM BROMIDE 10 MG/ML
INJECTION, SOLUTION INTRAVENOUS
Status: DISCONTINUED | OUTPATIENT
Start: 2025-02-19 | End: 2025-02-19 | Stop reason: SDUPTHER

## 2025-02-19 RX ORDER — FENTANYL CITRATE 50 UG/ML
INJECTION, SOLUTION INTRAMUSCULAR; INTRAVENOUS
Status: DISCONTINUED | OUTPATIENT
Start: 2025-02-19 | End: 2025-02-19 | Stop reason: SDUPTHER

## 2025-02-19 RX ORDER — SODIUM CHLORIDE 0.9 % (FLUSH) 0.9 %
5-40 SYRINGE (ML) INJECTION PRN
Status: DISCONTINUED | OUTPATIENT
Start: 2025-02-19 | End: 2025-02-25 | Stop reason: HOSPADM

## 2025-02-19 RX ORDER — SODIUM CHLORIDE, SODIUM LACTATE, POTASSIUM CHLORIDE, CALCIUM CHLORIDE 600; 310; 30; 20 MG/100ML; MG/100ML; MG/100ML; MG/100ML
INJECTION, SOLUTION INTRAVENOUS
Status: DISCONTINUED | OUTPATIENT
Start: 2025-02-19 | End: 2025-02-19 | Stop reason: SDUPTHER

## 2025-02-19 RX ORDER — SODIUM CHLORIDE 0.9 % (FLUSH) 0.9 %
5-40 SYRINGE (ML) INJECTION EVERY 12 HOURS SCHEDULED
Status: DISCONTINUED | OUTPATIENT
Start: 2025-02-19 | End: 2025-02-25 | Stop reason: HOSPADM

## 2025-02-19 RX ORDER — IODIXANOL 270 MG/ML
100 INJECTION, SOLUTION INTRAVASCULAR
Status: COMPLETED | OUTPATIENT
Start: 2025-02-19 | End: 2025-02-19

## 2025-02-19 RX ORDER — LEVETIRACETAM 15 MG/ML
1500 INJECTION INTRAVASCULAR
Status: COMPLETED | OUTPATIENT
Start: 2025-02-19 | End: 2025-02-19

## 2025-02-19 RX ORDER — NICARDIPINE HYDROCHLORIDE 0.1 MG/ML
2.5-15 INJECTION INTRAVENOUS CONTINUOUS
Status: DISCONTINUED | OUTPATIENT
Start: 2025-02-19 | End: 2025-02-19

## 2025-02-19 RX ORDER — CEFAZOLIN SODIUM 1 G/3ML
INJECTION, POWDER, FOR SOLUTION INTRAMUSCULAR; INTRAVENOUS
Status: DISCONTINUED | OUTPATIENT
Start: 2025-02-19 | End: 2025-02-19 | Stop reason: SDUPTHER

## 2025-02-19 RX ORDER — INSULIN LISPRO 100 [IU]/ML
0-4 INJECTION, SOLUTION INTRAVENOUS; SUBCUTANEOUS EVERY 6 HOURS
Status: DISCONTINUED | OUTPATIENT
Start: 2025-02-19 | End: 2025-02-20

## 2025-02-19 RX ORDER — ONDANSETRON 4 MG/1
4 TABLET, ORALLY DISINTEGRATING ORAL EVERY 8 HOURS PRN
Status: DISCONTINUED | OUTPATIENT
Start: 2025-02-19 | End: 2025-02-19

## 2025-02-19 RX ORDER — ONDANSETRON 4 MG/1
4 TABLET, ORALLY DISINTEGRATING ORAL EVERY 8 HOURS PRN
Status: DISCONTINUED | OUTPATIENT
Start: 2025-02-19 | End: 2025-02-25 | Stop reason: HOSPADM

## 2025-02-19 RX ORDER — POTASSIUM CHLORIDE 7.45 MG/ML
10 INJECTION INTRAVENOUS PRN
Status: DISCONTINUED | OUTPATIENT
Start: 2025-02-19 | End: 2025-02-24

## 2025-02-19 RX ORDER — GLUCAGON 1 MG/ML
1 KIT INJECTION PRN
Status: DISCONTINUED | OUTPATIENT
Start: 2025-02-19 | End: 2025-02-21

## 2025-02-19 RX ORDER — NICARDIPINE HYDROCHLORIDE 0.1 MG/ML
2.5-15 INJECTION INTRAVENOUS CONTINUOUS
Status: DISCONTINUED | OUTPATIENT
Start: 2025-02-19 | End: 2025-02-20

## 2025-02-19 RX ORDER — POLYETHYLENE GLYCOL 3350 17 G/17G
17 POWDER, FOR SOLUTION ORAL DAILY PRN
Status: DISCONTINUED | OUTPATIENT
Start: 2025-02-19 | End: 2025-02-25 | Stop reason: HOSPADM

## 2025-02-19 RX ORDER — CHLORHEXIDINE GLUCONATE ORAL RINSE 1.2 MG/ML
15 SOLUTION DENTAL 2 TIMES DAILY
Status: DISCONTINUED | OUTPATIENT
Start: 2025-02-19 | End: 2025-02-24

## 2025-02-19 RX ORDER — PHENYLEPHRINE HCL IN 0.9% NACL 50MG/250ML
10-300 PLASTIC BAG, INJECTION (ML) INTRAVENOUS CONTINUOUS
Status: DISCONTINUED | OUTPATIENT
Start: 2025-02-19 | End: 2025-02-20

## 2025-02-19 RX ORDER — POTASSIUM CHLORIDE 1500 MG/1
40 TABLET, EXTENDED RELEASE ORAL PRN
Status: DISCONTINUED | OUTPATIENT
Start: 2025-02-19 | End: 2025-02-24

## 2025-02-19 RX ORDER — ACETAMINOPHEN 325 MG/1
650 TABLET ORAL EVERY 6 HOURS PRN
Status: DISCONTINUED | OUTPATIENT
Start: 2025-02-19 | End: 2025-02-25 | Stop reason: HOSPADM

## 2025-02-19 RX ORDER — DEXTROSE MONOHYDRATE 100 MG/ML
INJECTION, SOLUTION INTRAVENOUS CONTINUOUS PRN
Status: DISCONTINUED | OUTPATIENT
Start: 2025-02-19 | End: 2025-02-21 | Stop reason: SDUPTHER

## 2025-02-19 RX ORDER — PROPOFOL 10 MG/ML
INJECTION, EMULSION INTRAVENOUS
Status: DISCONTINUED | OUTPATIENT
Start: 2025-02-19 | End: 2025-02-19 | Stop reason: SDUPTHER

## 2025-02-19 RX ORDER — PHENYLEPHRINE HCL IN 0.9% NACL 50MG/250ML
10-300 PLASTIC BAG, INJECTION (ML) INTRAVENOUS CONTINUOUS
Status: DISCONTINUED | OUTPATIENT
Start: 2025-02-19 | End: 2025-02-19

## 2025-02-19 RX ORDER — ONDANSETRON 2 MG/ML
4 INJECTION INTRAMUSCULAR; INTRAVENOUS EVERY 6 HOURS PRN
Status: DISCONTINUED | OUTPATIENT
Start: 2025-02-19 | End: 2025-02-25 | Stop reason: HOSPADM

## 2025-02-19 RX ORDER — GLYCOPYRROLATE 0.2 MG/ML
INJECTION INTRAMUSCULAR; INTRAVENOUS
Status: DISCONTINUED | OUTPATIENT
Start: 2025-02-19 | End: 2025-02-19 | Stop reason: SDUPTHER

## 2025-02-19 RX ORDER — EPHEDRINE SULFATE/0.9% NACL/PF 25 MG/5 ML
SYRINGE (ML) INTRAVENOUS
Status: DISCONTINUED | OUTPATIENT
Start: 2025-02-19 | End: 2025-02-19 | Stop reason: SDUPTHER

## 2025-02-19 RX ORDER — IOPAMIDOL 755 MG/ML
50 INJECTION, SOLUTION INTRAVASCULAR
Status: COMPLETED | OUTPATIENT
Start: 2025-02-19 | End: 2025-02-19

## 2025-02-19 RX ORDER — ATORVASTATIN CALCIUM 10 MG/1
40 TABLET, FILM COATED ORAL NIGHTLY
Status: DISCONTINUED | OUTPATIENT
Start: 2025-02-19 | End: 2025-02-21

## 2025-02-19 RX ORDER — PROPOFOL 10 MG/ML
5-50 INJECTION, EMULSION INTRAVENOUS CONTINUOUS
Status: DISCONTINUED | OUTPATIENT
Start: 2025-02-19 | End: 2025-02-20

## 2025-02-19 RX ORDER — SODIUM CHLORIDE 9 MG/ML
INJECTION, SOLUTION INTRAVENOUS CONTINUOUS
Status: DISCONTINUED | OUTPATIENT
Start: 2025-02-19 | End: 2025-02-20

## 2025-02-19 RX ORDER — MAGNESIUM SULFATE IN WATER 40 MG/ML
2000 INJECTION, SOLUTION INTRAVENOUS PRN
Status: DISCONTINUED | OUTPATIENT
Start: 2025-02-19 | End: 2025-02-25 | Stop reason: HOSPADM

## 2025-02-19 RX ORDER — ACETAMINOPHEN 650 MG/1
650 SUPPOSITORY RECTAL EVERY 6 HOURS PRN
Status: DISCONTINUED | OUTPATIENT
Start: 2025-02-19 | End: 2025-02-25 | Stop reason: HOSPADM

## 2025-02-19 RX ORDER — LABETALOL HYDROCHLORIDE 5 MG/ML
10 INJECTION, SOLUTION INTRAVENOUS
Status: DISCONTINUED | OUTPATIENT
Start: 2025-02-19 | End: 2025-02-20

## 2025-02-19 RX ORDER — ONDANSETRON 2 MG/ML
4 INJECTION INTRAMUSCULAR; INTRAVENOUS EVERY 6 HOURS PRN
Status: DISCONTINUED | OUTPATIENT
Start: 2025-02-19 | End: 2025-02-19

## 2025-02-19 RX ORDER — LIDOCAINE HYDROCHLORIDE 10 MG/ML
INJECTION, SOLUTION INFILTRATION; PERINEURAL
Status: DISCONTINUED | OUTPATIENT
Start: 2025-02-19 | End: 2025-02-19 | Stop reason: SDUPTHER

## 2025-02-19 RX ORDER — IOPAMIDOL 755 MG/ML
150 INJECTION, SOLUTION INTRAVASCULAR
Status: COMPLETED | OUTPATIENT
Start: 2025-02-19 | End: 2025-02-19

## 2025-02-19 RX ADMIN — LEVETIRACETAM 1500 MG: 15 INJECTION, SOLUTION INTRAVENOUS at 07:17

## 2025-02-19 RX ADMIN — PROPOFOL 150 MG: 10 INJECTION, EMULSION INTRAVENOUS at 08:05

## 2025-02-19 RX ADMIN — ROCURONIUM BROMIDE 45 MG: 10 INJECTION, SOLUTION INTRAVENOUS at 08:11

## 2025-02-19 RX ADMIN — PROPOFOL 25 MCG/KG/MIN: 10 INJECTION, EMULSION INTRAVENOUS at 11:24

## 2025-02-19 RX ADMIN — SODIUM CHLORIDE, PRESERVATIVE FREE 10 ML: 5 INJECTION INTRAVENOUS at 13:00

## 2025-02-19 RX ADMIN — NICARDIPINE HYDROCHLORIDE 5 MG/HR: 0.1 INJECTION INTRAVENOUS at 06:36

## 2025-02-19 RX ADMIN — IOPAMIDOL 150 ML: 755 INJECTION, SOLUTION INTRAVENOUS at 04:22

## 2025-02-19 RX ADMIN — IODIXANOL 62 ML: 270 INJECTION, SOLUTION INTRAVASCULAR at 09:57

## 2025-02-19 RX ADMIN — PROPOFOL 25 MCG/KG/MIN: 10 INJECTION, EMULSION INTRAVENOUS at 09:48

## 2025-02-19 RX ADMIN — PHENYLEPHRINE HYDROCHLORIDE 100 MCG: 10 INJECTION INTRAVENOUS at 08:08

## 2025-02-19 RX ADMIN — Medication 100 MG: at 08:05

## 2025-02-19 RX ADMIN — PHENYLEPHRINE HYDROCHLORIDE 200 MCG: 10 INJECTION INTRAVENOUS at 08:31

## 2025-02-19 RX ADMIN — GLYCOPYRROLATE 0.2 MG: 0.2 INJECTION INTRAMUSCULAR; INTRAVENOUS at 08:32

## 2025-02-19 RX ADMIN — INSULIN LISPRO 1 UNITS: 100 INJECTION, SOLUTION INTRAVENOUS; SUBCUTANEOUS at 12:47

## 2025-02-19 RX ADMIN — GADOTERIDOL 18 ML: 279.3 INJECTION, SOLUTION INTRAVENOUS at 17:54

## 2025-02-19 RX ADMIN — PHENYLEPHRINE HYDROCHLORIDE 75 MCG/MIN: 10 INJECTION INTRAVENOUS at 08:39

## 2025-02-19 RX ADMIN — ROCURONIUM BROMIDE 5 MG: 10 INJECTION, SOLUTION INTRAVENOUS at 08:05

## 2025-02-19 RX ADMIN — HYDRALAZINE HYDROCHLORIDE 10 MG: 20 INJECTION INTRAMUSCULAR; INTRAVENOUS at 12:38

## 2025-02-19 RX ADMIN — SODIUM CHLORIDE: 0.9 INJECTION, SOLUTION INTRAVENOUS at 11:23

## 2025-02-19 RX ADMIN — CHLORHEXIDINE GLUCONATE 15 ML: 1.2 SOLUTION ORAL at 12:59

## 2025-02-19 RX ADMIN — SODIUM CHLORIDE, PRESERVATIVE FREE 10 ML: 5 INJECTION INTRAVENOUS at 22:23

## 2025-02-19 RX ADMIN — LEVETIRACETAM 1500 MG: 15 INJECTION, SOLUTION INTRAVENOUS at 08:33

## 2025-02-19 RX ADMIN — PHENYLEPHRINE HYDROCHLORIDE 200 MCG: 10 INJECTION INTRAVENOUS at 09:03

## 2025-02-19 RX ADMIN — SODIUM CHLORIDE, PRESERVATIVE FREE 40 MG: 5 INJECTION INTRAVENOUS at 12:59

## 2025-02-19 RX ADMIN — SODIUM CHLORIDE, POTASSIUM CHLORIDE, SODIUM LACTATE AND CALCIUM CHLORIDE: 600; 310; 30; 20 INJECTION, SOLUTION INTRAVENOUS at 08:01

## 2025-02-19 RX ADMIN — IOPAMIDOL 50 ML: 755 INJECTION, SOLUTION INTRAVENOUS at 07:12

## 2025-02-19 RX ADMIN — SODIUM CHLORIDE, PRESERVATIVE FREE 10 ML: 5 INJECTION INTRAVENOUS at 12:59

## 2025-02-19 RX ADMIN — ROCURONIUM BROMIDE 20 MG: 10 INJECTION, SOLUTION INTRAVENOUS at 09:15

## 2025-02-19 RX ADMIN — LIDOCAINE HYDROCHLORIDE 50 MG: 10 INJECTION, SOLUTION INFILTRATION; PERINEURAL at 08:05

## 2025-02-19 RX ADMIN — FENTANYL CITRATE 50 MCG: 50 INJECTION, SOLUTION INTRAMUSCULAR; INTRAVENOUS at 10:01

## 2025-02-19 RX ADMIN — CEFAZOLIN 2 G: 1 INJECTION, POWDER, FOR SOLUTION INTRAMUSCULAR; INTRAVENOUS at 08:31

## 2025-02-19 RX ADMIN — ATORVASTATIN CALCIUM 40 MG: 10 TABLET, FILM COATED ORAL at 22:23

## 2025-02-19 RX ADMIN — FENTANYL CITRATE 50 MCG: 50 INJECTION, SOLUTION INTRAMUSCULAR; INTRAVENOUS at 09:49

## 2025-02-19 RX ADMIN — CHLORHEXIDINE GLUCONATE 15 ML: 1.2 SOLUTION ORAL at 22:23

## 2025-02-19 RX ADMIN — INSULIN LISPRO 1 UNITS: 100 INJECTION, SOLUTION INTRAVENOUS; SUBCUTANEOUS at 23:42

## 2025-02-19 RX ADMIN — ROCURONIUM BROMIDE 20 MG: 10 INJECTION, SOLUTION INTRAVENOUS at 09:55

## 2025-02-19 RX ADMIN — PHENYLEPHRINE HYDROCHLORIDE 200 MCG: 10 INJECTION INTRAVENOUS at 08:23

## 2025-02-19 RX ADMIN — PHENYLEPHRINE HYDROCHLORIDE 200 MCG: 10 INJECTION INTRAVENOUS at 08:33

## 2025-02-19 RX ADMIN — PHENYLEPHRINE HYDROCHLORIDE 200 MCG: 10 INJECTION INTRAVENOUS at 09:07

## 2025-02-19 RX ADMIN — EPHEDRINE SULFATE 5 MG: 5 INJECTION INTRAVENOUS at 09:45

## 2025-02-19 RX ADMIN — NICARDIPINE HYDROCHLORIDE 5 MG/HR: 0.1 INJECTION INTRAVENOUS at 12:54

## 2025-02-19 ASSESSMENT — PULMONARY FUNCTION TESTS
PIF_VALUE: 14
PIF_VALUE: 24
PIF_VALUE: 20
PIF_VALUE: 17
PIF_VALUE: 20
PIF_VALUE: 20
PIF_VALUE: 18

## 2025-02-19 ASSESSMENT — LIFESTYLE VARIABLES
HOW MANY STANDARD DRINKS CONTAINING ALCOHOL DO YOU HAVE ON A TYPICAL DAY: PATIENT UNABLE TO ANSWER
HOW OFTEN DO YOU HAVE A DRINK CONTAINING ALCOHOL: PATIENT UNABLE TO ANSWER

## 2025-02-19 NOTE — BRIEF OP NOTE
[facility]    NEUROENDOVASCULAR SERVICE: POST-OP NOTE: 2/19/2025    Pt Name: Mart Frias  MRN: [id number]  YOB: 1950  Date of Procedure: 2/19/2025  Primary Care Physician: Zaina Gonsalez MD        Pre-Procedural Diagnosis: Left proximal M1 occlusion  Post-Procedural Diagnosis: Status post mechanical thrombectomy      Procedure Performed:Cerebral angiogram with mechanical thrombectomy of the proximal left M1 occlusion    Surgeon:   Juancho Ventura MD    Fellow:  Donald Zurita MD and Master Vincent MD PhD     Resident:  Wong Phillips MD    Assisting Tech:  Nicolasa Pena    PRE-PROCEDURAL EXAM:  Prestroke baseline mRS MODIFIED ROWAN SCORE: 0 - No symptoms at all.  Neurological exam performed and unchanged from initial H&P or consult  MODIFIED ROWAN SCORE: 4 - Moderate severe disability:  unable to walk or attend to own bodily needs without assistance.  CT head ASPECT score: 9    Anesthesia: General Anesthesia  An Immediate re-assessment was completed prior to sedation, and it is determined to be safe to proceed.  Complications: none    Intra-Operative EXAM:  Patient sedated with unchanged limited neurological exam    EBL: < Minimal      Cc            Specimens: Were not Obtained  Contrast:     Visipaque 270 low osmolar 62 Cc             Fluoro: 32.2 min    Findings:  Please see dictated Radiology note for further details  L MCA proximal M1 occlusion with no antegrade flow. TICI 0  The above lesion was treated with 8F short sheath, 8F BMX 96, 6F Sim 2 and glide wire.  Rebar 18 and Jono 14.  SR Embotrap 6.5 x 45 mm x with Red 72 (penumbra pump) / BMX 96 (60 cc syringe).  Red clot retrieved. TICI 3                 Number of passes: 1  TICI score: 3      POST-PROCEDURAL EXAM :   Stable neurological Exam  Neurological exam performed and unchanged from initial H&P or consult    Closure:  right Angioseal 8   F        POST-PROCEDURAL MONITORING : see orders  Disposition:

## 2025-02-19 NOTE — PROCEDURES
PROCEDURE NOTE      PROCEDURE NOTE - LACERATION CLOSURE    PATIENT NAME: Mart Frias  MEDICAL RECORD NO. [id number]  DATE: 2/19/2025  SURGEON: Herberth Lira MD  PRIMARY CARE PHYSICIAN: Zaina Gonsalez MD    PREOPERATIVE DIAGNOSIS: Laceration(s) as follows:   LOCATION: Right Elbow    LENGTH: 2 cm    LAYERED CLOSURE: No    POSTOPERATIVE DIAGNOSIS:  Same  PROCEDURE PERFORMED:  Suture closure of laceration  ANESTHESIA:  Local utilizing  Lidocaine 1% with epinephrine  ESTIMATED BLOOD LOSS:  Less than 25 ml.  COMPLICATIONS:  None immediately appreciated.  OPERATIVE NOTE PREPARED BY: Herberth Lira MD     DISCUSSION:  Mart Frias is a 74 y.o.-year-old male. The history and physical examination were reviewed and confirmed.  The diagnoses, proposed procedure, risks, possible complications, benefits and alternatives were discussed with the patient or family. He was given the opportunity to ask questions, and once answered, informed consent was obtained.  The patient was then prepared for the procedure.    PROCEDURE:  A timeout was initiated and the procedure and patient were confirmed by those present.  The wound was repaired with 4-0 Ethilon; 3 sutures were used.    No immediate complication was evident.  All sponge, instrument and needle counts were correct at the completion of the procedure.       Herberth Lira MD  2/19/25, 5:28 AM

## 2025-02-19 NOTE — H&P
Reviewed   TRAUMA PANEL   VITAMIN B12   CK   TROP/MYOGLOBIN   T4, FREE   HEMOGLOBIN A1C   LIPASE   HEPATIC FUNCTION PANEL   TSH   VITAMIN D 25 HYDROXY         Herberth Lira MD  2/19/25, 4:25 AM    
of the right vertebral artery may represent a   dissection.   3. Severe atherosclerosis with 75% stenosis of the right ICA origin by NASCET   criteria.         XR ELBOW RIGHT (2 VIEWS)    (Results Pending)   IR ANGIOGRAM CAROTID C EREBRAL BILATERAL    (Results Pending)        Labs and Images reviewed with:    [] Guero Giron MD  [x] Beto Morris MD  [] Catalino Conn MD  [] there are no new interval images to review.     ASSESSMENT AND PLAN:         ASSESSMENT:     This is a 74 y.o. male with a history of hypertension, hyperlipidemia, CAD s/p CABG x2, chronic diastolic congestive heart failure, type 2 diabetes mellitus who presented following an unwitnessed fall at home.  Patient was evaluated as a stroke alert, initial NIH 17, CTA head and neck showed a left M1 occlusion and patient was taken for a cerebral angiogram with mechanical thrombectomy and presents to the neuro ICU postprocedure.    Patient care will be discussed with attending, will reevaluate patient along with attending.     PLAN/MEDICAL DECISION MAKING:    NEUROLOGIC:  Acute ischemic stroke, left proximal M1 occlusion, unclear etiology   Right cerebellar intraparenchymal hemorrhage, unclear etiology  - s/p cerebral angiogram with mechanical thrombectomy of the proximal left M1 (2/19)  - Neuro endovascular following, appreciate recommendations  - Likely restart aspirin and Brilinta pending repeat CT head  - Follow-up MRI brain with and without contrast  - Continue home Lipitor 40 mg nightly for secondary stroke prevention  - Stroke workup including hemoglobin A1c, lipid panel, echocardiogram with bubble study  - Consider TANK and loop recorder placement  - Wean propofol as tolerated  - Goal -140  - Neuro checks per protocol  - Cervical collar remains in place, CT cervical spine negative for acute abnormality, C-spine clearance pending evaluation of pain postextubation    CARDIOVASCULAR:  Mild hypotension, likely secondary to sedation  CAD s/p

## 2025-02-19 NOTE — ED PROVIDER NOTES
[facility] EMERGENCY DEPARTMENT  Emergency Department Encounter  Emergency Medicine Resident     Pt Name:Mart Frias  MRN: [id number]  Birthdate 1950  Date of evaluation: 2/19/25  PCP:  Zaina Gonsalez MD  Note Started: 4:31 AM EST      CHIEF COMPLAINT       Chief Complaint   Patient presents with    Fall     Found face down in the shower.       HISTORY OF PRESENT ILLNESS  (Location/Symptom, Timing/Onset, Context/Setting, Quality, Duration, Modifying Factors, Severity.)      Mart Frias is a 74 y.o. male past medical history of diabetes, CAD, congestive heart failure, hypertension, presenting for assessment as a trauma priority.  Patient was found down in the shower.  Unclear for how long.  Unresponsive to verbal stimuli with GCS of 9.  Per review of his linked chart, he is on Brilinta at home.  No additional history can be obtained.    PAST MEDICAL / SURGICAL / SOCIAL / FAMILY HISTORY      has no past medical history on file.        has no past surgical history on file.       Social History     Socioeconomic History    Marital status:      Spouse name: Not on file    Number of children: Not on file    Years of education: Not on file    Highest education level: Not on file   Occupational History    Not on file   Tobacco Use    Smoking status: Not on file    Smokeless tobacco: Not on file   Substance and Sexual Activity    Alcohol use: Not on file    Drug use: Not on file    Sexual activity: Not on file   Other Topics Concern    Not on file   Social History Narrative    Not on file     Social Determinants of Health     Financial Resource Strain: Not on file   Food Insecurity: Not on file   Transportation Needs: Not on file   Physical Activity: Not on file   Stress: Not on file   Social Connections: Not on file   Intimate Partner Violence: Not on file   Housing Stability: Not on file       No family history on file.    Allergies:  Patient has no allergy information on record.    Home

## 2025-02-19 NOTE — CARE COORDINATION
Case Management Assessment  Initial Evaluation    Date/Time of Evaluation: 2/19/2025 2:30 PM  Assessment Completed by: Shaniqua Luo RN    If patient is discharged prior to next notation, then this note serves as note for discharge by case management.    Patient Name: Anthony Jacobson                   YOB: 1950  Diagnosis: Fall, initial encounter [W19.XXXA]  Internal carotid artery occlusion, left [I65.22]  Cerebrovascular accident (CVA), unspecified mechanism (HCC) [I63.9]                   Date / Time: 2/19/2025  4:02 AM    Patient Admission Status: Inpatient   Readmission Risk (Low < 19, Mod (19-27), High > 27): Readmission Risk Score: 7.5    Current PCP: Zaina Gonsalez MD  PCP verified by CM? Yes    Chart Reviewed: Yes      History Provided by: Child/Family  Patient Orientation: Unresponsive (Intubated with sedation off, spoke with son Giuseppe)    Patient Cognition: Other (see comment) (Intubated with sedation off, spoke with douglas Chin)    Hospitalization in the last 30 days (Readmission):  No    If yes, Readmission Assessment in  Navigator will be completed.    Advance Directives:      Code Status: Full Code   Patient's Primary Decision Maker is: Legal Next of Kin      Discharge Planning:    Patient lives with: (P) Children Type of Home: (P) House  Primary Care Giver: Self  Patient Support Systems include: Children   Current Financial resources: (P) Medicare  Current community resources: (P) None  Current services prior to admission: (P) Durable Medical Equipment            Current DME: (P) Other (Comment) ([place] glucose monitor-not using)            Type of Home Care services:  (P) None    ADLS  Prior functional level: (P) Independent in ADLs/IADLs  Current functional level: (P) Assistance with the following:, Dressing, Toileting, Bathing, Mobility    PT AM-PAC:   /24  OT AM-PAC:   /24    Family can provide assistance at DC: (P) Yes  Would you like Case Management to discuss the discharge

## 2025-02-19 NOTE — ED TRIAGE NOTES
Pt brought by EMS as a trauma Priority for unwitnessed fall.  Pt was found face down on the bathroom, pt was alert but was not responding verbally as per EMS.

## 2025-02-19 NOTE — ANESTHESIA PRE PROCEDURE
Department of Anesthesiology  Preprocedure Note       Name:  Mart Frias   Age:  74 y.o.  :  1950                                          MRN:  [id number]         Date:  2025      Surgeon: * Surgery not found *    Procedure:     Medications prior to admission:   Prior to Admission medications    Not on File       Current medications:    No current facility-administered medications for this visit.     No current outpatient medications on file.     Facility-Administered Medications Ordered in Other Visits   Medication Dose Route Frequency Provider Last Rate Last Admin   • levETIRAcetam (KEPPRA) 1500 mg/100 mL IVPB  1,500 mg IntraVENous NOW Areli Watson MD       • levETIRAcetam (KEPPRA) 1500 mg/100 mL IVPB  1,500 mg IntraVENous NOW Areli Watson MD       • niCARdipine (CARDENE) 20 mg in 0.9 % sodium chloride 200 mL solution  2.5-15 mg/hr IntraVENous Continuous Donald Zurita MD       • iopamidol (ISOVUE-370) 76 % injection 50 mL  50 mL IntraVENous ONCE PRN Areli Watson MD           Allergies:  Not on File    Problem List:  There is no problem list on file for this patient.      Past Medical History:  No past medical history on file.    Past Surgical History:  No past surgical history on file.    Social History:    Social History     Tobacco Use   • Smoking status: Not on file   • Smokeless tobacco: Not on file   Substance Use Topics   • Alcohol use: Not on file                                Counseling given: Not Answered      Vital Signs (Current): There were no vitals filed for this visit.                                           BP Readings from Last 3 Encounters:   25 (!) 143/65       NPO Status:                                                                                 BMI:   Wt Readings from Last 3 Encounters:   25 95.3 kg (210 lb)     There is no height or weight on file to calculate BMI.    CBC:   Lab Results   Component Value Date/Time    WBC 11.2 2025

## 2025-02-19 NOTE — SEDATION DOCUMENTATION
PRE PROCEDURE ASSESSMENT:    Pre procedure assessment:    Pt awake/ alert, non verbal, not following commands  Pt presents as completely aphasic.  Pt stated name/ clearly, same as ID band.  Pt appears to be in no discomfort.  Peaceful expression on face, laying calmly in bed.    Resp even/ non labored.  Skin pink, warm, dry, cap refill < 3 sec.    Face with mild + 1 Rt facial drop/ nasal labial fold flattening, pupils PERRLA @  4 mm.  No obvious vision deficits, Pt appears to have mild Lt teran gaze deviation.    Pt moves all extremities x 4 with stimulation and held.  Good pulse, motor, sensory x 4.    Pedal pulses bilaterally palpable moderate and marked.    Son questioned about any known allergies, none noted...

## 2025-02-19 NOTE — SEDATION DOCUMENTATION
Closing Time:    Angio Seal deployed  All instrumentation removed   Manual pressure held at puncture site by Dr. Jacqueline Martínez applied externally and inflated to approx 40 cc of air

## 2025-02-19 NOTE — ED PROVIDER NOTES
[facility]     Emergency Department     Faculty Attestation    I performed a history and physical examination of the patient and discussed management with the resident. I have reviewed and agree with the resident’s findings including all diagnostic interpretations, and treatment plans as written. Any areas of disagreement are noted on the chart. I was personally present for the key portions of any procedures. I have documented in the chart those procedures where I was not present during the key portions. I have reviewed the emergency nurses triage note. I agree with the chief complaint, past medical history, past surgical history, allergies, medications, social and family history as documented unless otherwise noted below. Documentation of the HPI, Physical Exam and Medical Decision Making performed by [email or web address] is based on my personal performance of the HPI, PE and MDM. For Physician Assistant/ Nurse Practitioner cases/documentation I have personally evaluated this patient and have completed at least one if not all key elements of the E/M (history, physical exam, and MDM). Additional findings are as noted.    Note Started: 4:22 AM EST     Adult trauma, s/p fall,   PE airway intact, 194/68, DANIS, non verbal, trachea midline, collar in place, no cervical deformity, or crepitus,  ---trauma following / neuro - stroke consult, await admit  EKG Interpretation    Interpreted by me  Mark, HR 66, no ST elevation, normal axis, QTc 425 / biphasic t v 4-v6    CRITICAL CARE: There was a high probability of clinically significant/life threatening deterioration in this patient's condition which required my urgent intervention.  Total critical care time was 0 minutes.  This excludes any time for separately reportable procedures.       Jadiel Naylor DO  02/19/25 0423       Jadiel Graf DO  02/19/25 0444       Jadiel Graf DO  02/19/25 0601

## 2025-02-19 NOTE — PLAN OF CARE
Problem: Safety - Medical Restraint  Goal: Remains free of injury from restraints (Restraint for Interference with Medical Device)  Description: INTERVENTIONS:  1. Determine that other, less restrictive measures have been tried or would not be effective before applying the restraint  2. Evaluate the patient's condition at the time of restraint application  3. Inform patient/family regarding the reason for restraint  4. Q2H: Monitor safety, psychosocial status, comfort, nutrition and hydration  Outcome: Progressing  Flowsheets  Taken 2/19/2025 1400  Remains free of injury from restraints (restraint for interference with medical device):   Determine that other, less restrictive measures have been tried or would not be effective before applying the restraint   Evaluate the patient's condition at the time of restraint application  Taken 2/19/2025 1200  Remains free of injury from restraints (restraint for interference with medical device):   Determine that other, less restrictive measures have been tried or would not be effective before applying the restraint   Evaluate the patient's condition at the time of restraint application  Taken 2/19/2025 1053  Remains free of injury from restraints (restraint for interference with medical device):   Determine that other, less restrictive measures have been tried or would not be effective before applying the restraint   Evaluate the patient's condition at the time of restraint application

## 2025-02-19 NOTE — PLAN OF CARE
Problem: Respiratory - Adult  Goal: Achieves optimal ventilation and oxygenation  Outcome: Progressing  Flowsheets (Taken 2/19/2025 2509)  Achieves optimal ventilation and oxygenation:   Assess for changes in respiratory status   Position to facilitate oxygenation and minimize respiratory effort   Assess the need for suctioning and aspirate as needed   Respiratory therapy support as indicated   Assess for changes in mentation and behavior   Oxygen supplementation based on oxygen saturation or arterial blood gases   Encourage broncho-pulmonary hygiene including cough, deep breathe, incentive spirometry   Assess and instruct to report shortness of breath or any respiratory difficulty

## 2025-02-19 NOTE — DISCHARGE INSTR - COC
Continuity of Care Form    Patient Name: Anthony Jacobson   :  1950  MRN:  [id number]    Admit date:  2025  Discharge date:  ***    Code Status Order: Full Code   Advance Directives:   Advance Care Flowsheet Documentation             Admitting Physician:  Beto Morris MD  PCP: Zaina Gonsalez MD    Discharging Nurse: ***  Discharging Hospital Unit/Room#: 0125/0125-01  Discharging Unit Phone Number: ***    Emergency Contact:   Extended Emergency Contact Information  Primary Emergency Contact: Giuseppe Jacobson  Home Phone: [phone]  Relation: Child  Preferred language: English   needed? No    Past Surgical History:  No past surgical history on file.    Immunization History:   Immunization History   Administered Date(s) Administered    COVID-19, PFIZER PURPLE top, DILUTE for use, (age 12 y+), 30mcg/0.3mL 2021, 03/10/2021, 11/10/2021       Active Problems:  Patient Active Problem List   Diagnosis Code    Internal carotid artery occlusion, left I65.22    Fall W19.XXXA       Isolation/Infection:   Isolation            No Isolation          Patient Infection Status       None to display            Nurse Assessment:  Last Vital Signs: BP (!) 143/65   Pulse (!) 49   Temp 97.4 °F (36.3 °C) (Oral)   Resp 17   Ht 1.778 m (5' 10\")   Wt 95.3 kg (210 lb)   SpO2 98%   BMI 30.13 kg/m²     Last documented pain score (0-10 scale):    Last Weight:   Wt Readings from Last 1 Encounters:   25 95.3 kg (210 lb)     Mental Status:  {IP PT MENTAL STATUS:}    IV Access:  { VENTURA IV ACCESS:429387656}    Nursing Mobility/ADLs:  Walking   {CHP DME ADLs:006417367}  Transfer  {CHP DME ADLs:285941967}  Bathing  {CHP DME ADLs:760485262}  Dressing  {CHP DME ADLs:995586694}  Toileting  {CHP DME ADLs:652918653}  Feeding  {CHP DME ADLs:122070467}  Med Admin  {CHP DME ADLs:137490044}  Med Delivery   { VENTURA MED Delivery:401364947}    Wound Care Documentation and Therapy:        Elimination:  Continence:

## 2025-02-20 ENCOUNTER — APPOINTMENT (OUTPATIENT)
Dept: GENERAL RADIOLOGY | Age: 75
DRG: 023 | End: 2025-02-20
Payer: COMMERCIAL

## 2025-02-20 ENCOUNTER — APPOINTMENT (OUTPATIENT)
Dept: VASCULAR LAB | Age: 75
DRG: 023 | End: 2025-02-20
Payer: COMMERCIAL

## 2025-02-20 ENCOUNTER — APPOINTMENT (OUTPATIENT)
Dept: CT IMAGING | Age: 75
DRG: 023 | End: 2025-02-20
Payer: COMMERCIAL

## 2025-02-20 ENCOUNTER — APPOINTMENT (OUTPATIENT)
Age: 75
DRG: 023 | End: 2025-02-20
Payer: COMMERCIAL

## 2025-02-20 PROBLEM — Z51.5 ENCOUNTER FOR PALLIATIVE CARE: Status: ACTIVE | Noted: 2025-02-20

## 2025-02-20 PROBLEM — Z71.89 GOALS OF CARE, COUNSELING/DISCUSSION: Status: ACTIVE | Noted: 2025-02-20

## 2025-02-20 PROBLEM — I63.512 ACUTE ISCHEMIC LEFT MIDDLE CEREBRAL ARTERY (MCA) STROKE (HCC): Status: ACTIVE | Noted: 2025-02-20

## 2025-02-20 LAB
ALLEN TEST: ABNORMAL
ANION GAP SERPL CALCULATED.3IONS-SCNC: 14 MMOL/L (ref 9–16)
BACTERIA URNS QL MICRO: ABNORMAL
BASOPHILS # BLD: 0 K/UL (ref 0–0.2)
BASOPHILS NFR BLD: 0 % (ref 0–2)
BILIRUB UR QL STRIP: NEGATIVE
BUN SERPL-MCNC: 12 MG/DL (ref 8–23)
CALCIUM SERPL-MCNC: 8 MG/DL (ref 8.6–10.4)
CASTS #/AREA URNS LPF: ABNORMAL /LPF (ref 0–8)
CHLORIDE SERPL-SCNC: 103 MMOL/L (ref 98–107)
CHOLEST SERPL-MCNC: 89 MG/DL (ref 0–199)
CHOLESTEROL/HDL RATIO: 2.3
CLARITY UR: ABNORMAL
CO2 SERPL-SCNC: 20 MMOL/L (ref 20–31)
COLOR UR: ABNORMAL
CREAT SERPL-MCNC: 0.8 MG/DL (ref 0.7–1.2)
ECHO BSA: 2.17 M2
ECHO LV EF PHYSICIAN: 55 %
EKG ATRIAL RATE: 66 BPM
EKG P AXIS: 71 DEGREES
EKG P-R INTERVAL: 150 MS
EKG Q-T INTERVAL: 406 MS
EKG QRS DURATION: 90 MS
EKG QTC CALCULATION (BAZETT): 425 MS
EKG R AXIS: 29 DEGREES
EKG T AXIS: 172 DEGREES
EKG VENTRICULAR RATE: 66 BPM
EOSINOPHIL # BLD: 0 K/UL (ref 0–0.4)
EOSINOPHILS RELATIVE PERCENT: 0 % (ref 1–4)
EPI CELLS #/AREA URNS HPF: ABNORMAL /HPF (ref 0–5)
ERYTHROCYTE [DISTWIDTH] IN BLOOD BY AUTOMATED COUNT: 14.6 % (ref 11.8–14.4)
EST. AVERAGE GLUCOSE BLD GHB EST-MCNC: 295 MG/DL
FIO2: 40
GFR, ESTIMATED: >90 ML/MIN/1.73M2
GLUCOSE BLD-MCNC: 176 MG/DL (ref 75–110)
GLUCOSE BLD-MCNC: 201 MG/DL (ref 75–110)
GLUCOSE BLD-MCNC: 210 MG/DL (ref 75–110)
GLUCOSE BLD-MCNC: 224 MG/DL (ref 74–100)
GLUCOSE SERPL-MCNC: 203 MG/DL (ref 74–99)
GLUCOSE UR STRIP-MCNC: ABNORMAL MG/DL
HBA1C MFR BLD: 11.9 % (ref 4–6)
HCT VFR BLD AUTO: 36 % (ref 40.7–50.3)
HDLC SERPL-MCNC: 38 MG/DL
HGB BLD-MCNC: 11.8 G/DL (ref 13–17)
HGB UR QL STRIP.AUTO: ABNORMAL
IMM GRANULOCYTES # BLD AUTO: 0.16 K/UL (ref 0–0.3)
IMM GRANULOCYTES NFR BLD: 1 %
KETONES UR STRIP-MCNC: ABNORMAL MG/DL
LDLC SERPL CALC-MCNC: 33 MG/DL (ref 0–100)
LEUKOCYTE ESTERASE UR QL STRIP: NEGATIVE
LYMPHOCYTES NFR BLD: 1.25 K/UL (ref 1–4.8)
LYMPHOCYTES RELATIVE PERCENT: 8 % (ref 24–44)
MAGNESIUM SERPL-MCNC: 1.5 MG/DL (ref 1.6–2.4)
MCH RBC QN AUTO: 26.5 PG (ref 25.2–33.5)
MCHC RBC AUTO-ENTMCNC: 32.8 G/DL (ref 28.4–34.8)
MCV RBC AUTO: 80.7 FL (ref 82.6–102.9)
MODE: ABNORMAL
MONOCYTES NFR BLD: 1.56 K/UL (ref 0.1–0.8)
MONOCYTES NFR BLD: 10 % (ref 1–7)
MORPHOLOGY: ABNORMAL
MORPHOLOGY: ABNORMAL
NEGATIVE BASE EXCESS, ART: 0.8 MMOL/L (ref 0–2)
NEUTROPHILS NFR BLD: 81 % (ref 36–66)
NEUTS SEG NFR BLD: 12.63 K/UL (ref 1.8–7.7)
NITRITE UR QL STRIP: NEGATIVE
NRBC BLD-RTO: 0 PER 100 WBC
O2 DELIVERY DEVICE: ABNORMAL
PH UR STRIP: 5 [PH] (ref 5–8)
PLATELET # BLD AUTO: 294 K/UL (ref 138–453)
PMV BLD AUTO: 10.5 FL (ref 8.1–13.5)
POC HCO3: 21.8 MMOL/L (ref 21–28)
POC O2 SATURATION: 99.3 % (ref 94–98)
POC PCO2: 29.3 MM HG (ref 35–48)
POC PH: 7.48 (ref 7.35–7.45)
POC PO2: 134 MM HG (ref 83–108)
POTASSIUM SERPL-SCNC: 3.4 MMOL/L (ref 3.7–5.3)
PROT UR STRIP-MCNC: ABNORMAL MG/DL
RBC # BLD AUTO: 4.46 M/UL (ref 4.21–5.77)
RBC #/AREA URNS HPF: ABNORMAL /HPF (ref 0–2)
SAMPLE SITE: ABNORMAL
SODIUM SERPL-SCNC: 137 MMOL/L (ref 136–145)
SODIUM SERPL-SCNC: 139 MMOL/L (ref 136–145)
SP GR UR STRIP: 1.04 (ref 1–1.03)
TRIGL SERPL-MCNC: 88 MG/DL
UROBILINOGEN UR STRIP-ACNC: NORMAL EU/DL (ref 0–1)
VLDLC SERPL CALC-MCNC: 18 MG/DL (ref 1–30)
WBC #/AREA URNS HPF: ABNORMAL /HPF (ref 0–5)
WBC OTHER # BLD: 15.6 K/UL (ref 3.5–11.3)

## 2025-02-20 PROCEDURE — 6370000000 HC RX 637 (ALT 250 FOR IP)

## 2025-02-20 PROCEDURE — 74018 RADEX ABDOMEN 1 VIEW: CPT

## 2025-02-20 PROCEDURE — 81001 URINALYSIS AUTO W/SCOPE: CPT

## 2025-02-20 PROCEDURE — 80061 LIPID PANEL: CPT

## 2025-02-20 PROCEDURE — 2700000000 HC OXYGEN THERAPY PER DAY

## 2025-02-20 PROCEDURE — 94761 N-INVAS EAR/PLS OXIMETRY MLT: CPT

## 2025-02-20 PROCEDURE — 71045 X-RAY EXAM CHEST 1 VIEW: CPT

## 2025-02-20 PROCEDURE — 83036 HEMOGLOBIN GLYCOSYLATED A1C: CPT

## 2025-02-20 PROCEDURE — 2500000003 HC RX 250 WO HCPCS: Performed by: PSYCHIATRY & NEUROLOGY

## 2025-02-20 PROCEDURE — 6360000002 HC RX W HCPCS: Performed by: NURSE PRACTITIONER

## 2025-02-20 PROCEDURE — 93308 TTE F-UP OR LMTD: CPT | Performed by: INTERNAL MEDICINE

## 2025-02-20 PROCEDURE — 99233 SBSQ HOSP IP/OBS HIGH 50: CPT | Performed by: PSYCHIATRY & NEUROLOGY

## 2025-02-20 PROCEDURE — 3E0G76Z INTRODUCTION OF NUTRITIONAL SUBSTANCE INTO UPPER GI, VIA NATURAL OR ARTIFICIAL OPENING: ICD-10-PCS | Performed by: PSYCHIATRY & NEUROLOGY

## 2025-02-20 PROCEDURE — 6370000000 HC RX 637 (ALT 250 FOR IP): Performed by: NURSE PRACTITIONER

## 2025-02-20 PROCEDURE — 99222 1ST HOSP IP/OBS MODERATE 55: CPT | Performed by: NURSE PRACTITIONER

## 2025-02-20 PROCEDURE — 93010 ELECTROCARDIOGRAM REPORT: CPT | Performed by: INTERNAL MEDICINE

## 2025-02-20 PROCEDURE — 87641 MR-STAPH DNA AMP PROBE: CPT

## 2025-02-20 PROCEDURE — 85025 COMPLETE CBC W/AUTO DIFF WBC: CPT

## 2025-02-20 PROCEDURE — 0DH67UZ INSERTION OF FEEDING DEVICE INTO STOMACH, VIA NATURAL OR ARTIFICIAL OPENING: ICD-10-PCS | Performed by: PSYCHIATRY & NEUROLOGY

## 2025-02-20 PROCEDURE — 70450 CT HEAD/BRAIN W/O DYE: CPT

## 2025-02-20 PROCEDURE — 2580000003 HC RX 258: Performed by: NURSE PRACTITIONER

## 2025-02-20 PROCEDURE — 82947 ASSAY GLUCOSE BLOOD QUANT: CPT

## 2025-02-20 PROCEDURE — 95714 VEEG EA 12-26 HR UNMNTR: CPT

## 2025-02-20 PROCEDURE — 37799 UNLISTED PX VASCULAR SURGERY: CPT

## 2025-02-20 PROCEDURE — 36415 COLL VENOUS BLD VENIPUNCTURE: CPT

## 2025-02-20 PROCEDURE — 87040 BLOOD CULTURE FOR BACTERIA: CPT

## 2025-02-20 PROCEDURE — 2000000000 HC ICU R&B

## 2025-02-20 PROCEDURE — 83735 ASSAY OF MAGNESIUM: CPT

## 2025-02-20 PROCEDURE — 82803 BLOOD GASES ANY COMBINATION: CPT

## 2025-02-20 PROCEDURE — 6360000002 HC RX W HCPCS: Performed by: PSYCHIATRY & NEUROLOGY

## 2025-02-20 PROCEDURE — 2580000003 HC RX 258

## 2025-02-20 PROCEDURE — 87205 SMEAR GRAM STAIN: CPT

## 2025-02-20 PROCEDURE — 95700 EEG CONT REC W/VID EEG TECH: CPT

## 2025-02-20 PROCEDURE — 80048 BASIC METABOLIC PNL TOTAL CA: CPT

## 2025-02-20 PROCEDURE — 93308 TTE F-UP OR LMTD: CPT

## 2025-02-20 PROCEDURE — 84295 ASSAY OF SERUM SODIUM: CPT

## 2025-02-20 PROCEDURE — 94003 VENT MGMT INPAT SUBQ DAY: CPT

## 2025-02-20 PROCEDURE — 87070 CULTURE OTHR SPECIMN AEROBIC: CPT

## 2025-02-20 PROCEDURE — 2500000003 HC RX 250 WO HCPCS: Performed by: STUDENT IN AN ORGANIZED HEALTH CARE EDUCATION/TRAINING PROGRAM

## 2025-02-20 PROCEDURE — 93970 EXTREMITY STUDY: CPT

## 2025-02-20 PROCEDURE — 36556 INSERT NON-TUNNEL CV CATH: CPT

## 2025-02-20 PROCEDURE — 6360000002 HC RX W HCPCS

## 2025-02-20 PROCEDURE — 89220 SPUTUM SPECIMEN COLLECTION: CPT

## 2025-02-20 PROCEDURE — 6370000000 HC RX 637 (ALT 250 FOR IP): Performed by: PSYCHIATRY & NEUROLOGY

## 2025-02-20 PROCEDURE — 99291 CRITICAL CARE FIRST HOUR: CPT | Performed by: PSYCHIATRY & NEUROLOGY

## 2025-02-20 RX ORDER — LABETALOL HYDROCHLORIDE 5 MG/ML
10 INJECTION, SOLUTION INTRAVENOUS
Status: DISCONTINUED | OUTPATIENT
Start: 2025-02-20 | End: 2025-02-22

## 2025-02-20 RX ORDER — FENTANYL CITRATE 50 UG/ML
25 INJECTION, SOLUTION INTRAMUSCULAR; INTRAVENOUS
Status: DISCONTINUED | OUTPATIENT
Start: 2025-02-20 | End: 2025-02-20

## 2025-02-20 RX ORDER — FENTANYL CITRATE 50 UG/ML
50 INJECTION, SOLUTION INTRAMUSCULAR; INTRAVENOUS
Status: DISCONTINUED | OUTPATIENT
Start: 2025-02-20 | End: 2025-02-24

## 2025-02-20 RX ORDER — 3% SODIUM CHLORIDE 3 G/100ML
50 INJECTION, SOLUTION INTRAVENOUS CONTINUOUS
Status: DISCONTINUED | OUTPATIENT
Start: 2025-02-20 | End: 2025-02-23

## 2025-02-20 RX ORDER — DEXTROSE MONOHYDRATE 100 MG/ML
INJECTION, SOLUTION INTRAVENOUS CONTINUOUS PRN
Status: DISCONTINUED | OUTPATIENT
Start: 2025-02-20 | End: 2025-02-25 | Stop reason: HOSPADM

## 2025-02-20 RX ORDER — MAGNESIUM SULFATE IN WATER 40 MG/ML
2000 INJECTION, SOLUTION INTRAVENOUS ONCE
Status: DISCONTINUED | OUTPATIENT
Start: 2025-02-20 | End: 2025-02-24

## 2025-02-20 RX ORDER — GLUCAGON 1 MG/ML
1 KIT INJECTION PRN
Status: DISCONTINUED | OUTPATIENT
Start: 2025-02-20 | End: 2025-02-25 | Stop reason: HOSPADM

## 2025-02-20 RX ORDER — HYDRALAZINE HYDROCHLORIDE 20 MG/ML
10 INJECTION INTRAMUSCULAR; INTRAVENOUS
Status: DISCONTINUED | OUTPATIENT
Start: 2025-02-20 | End: 2025-02-22

## 2025-02-20 RX ORDER — INSULIN LISPRO 100 [IU]/ML
0-8 INJECTION, SOLUTION INTRAVENOUS; SUBCUTANEOUS EVERY 6 HOURS
Status: DISCONTINUED | OUTPATIENT
Start: 2025-02-20 | End: 2025-02-22

## 2025-02-20 RX ADMIN — SODIUM CHLORIDE, PRESERVATIVE FREE 10 ML: 5 INJECTION INTRAVENOUS at 21:29

## 2025-02-20 RX ADMIN — POTASSIUM BICARBONATE 40 MEQ: 782 TABLET, EFFERVESCENT ORAL at 11:47

## 2025-02-20 RX ADMIN — INSULIN LISPRO 2 UNITS: 100 INJECTION, SOLUTION INTRAVENOUS; SUBCUTANEOUS at 20:06

## 2025-02-20 RX ADMIN — SODIUM CHLORIDE 50 ML/HR: 3 INJECTION, SOLUTION INTRAVENOUS at 21:37

## 2025-02-20 RX ADMIN — SODIUM CHLORIDE, PRESERVATIVE FREE 40 MG: 5 INJECTION INTRAVENOUS at 11:11

## 2025-02-20 RX ADMIN — MAGNESIUM SULFATE HEPTAHYDRATE 2000 MG: 40 INJECTION, SOLUTION INTRAVENOUS at 11:01

## 2025-02-20 RX ADMIN — INSULIN LISPRO 1 UNITS: 100 INJECTION, SOLUTION INTRAVENOUS; SUBCUTANEOUS at 05:22

## 2025-02-20 RX ADMIN — SODIUM CHLORIDE 25 ML/HR: 3 INJECTION, SOLUTION INTRAVENOUS at 10:55

## 2025-02-20 RX ADMIN — FENTANYL CITRATE 50 MCG: 50 INJECTION INTRAMUSCULAR; INTRAVENOUS at 08:40

## 2025-02-20 RX ADMIN — CHLORHEXIDINE GLUCONATE 15 ML: 1.2 SOLUTION ORAL at 11:10

## 2025-02-20 RX ADMIN — SODIUM CHLORIDE 5 MG/HR: 9 INJECTION, SOLUTION INTRAVENOUS at 15:30

## 2025-02-20 RX ADMIN — ACETAMINOPHEN 650 MG: 325 TABLET ORAL at 23:35

## 2025-02-20 RX ADMIN — CHLORHEXIDINE GLUCONATE 15 ML: 1.2 SOLUTION ORAL at 21:29

## 2025-02-20 RX ADMIN — HYDRALAZINE HYDROCHLORIDE 10 MG: 20 INJECTION INTRAMUSCULAR; INTRAVENOUS at 06:11

## 2025-02-20 ASSESSMENT — PULMONARY FUNCTION TESTS
PIF_VALUE: 23
PIF_VALUE: 18
PIF_VALUE: 18
PIF_VALUE: 25
PIF_VALUE: 26
PIF_VALUE: 18
PIF_VALUE: 23
PIF_VALUE: 18
PIF_VALUE: 19
PIF_VALUE: 17
PIF_VALUE: 25
PIF_VALUE: 23
PIF_VALUE: 19
PIF_VALUE: 15
PIF_VALUE: 19
PIF_VALUE: 18
PIF_VALUE: 18
PIF_VALUE: 22
PIF_VALUE: 20
PIF_VALUE: 22
PIF_VALUE: 23
PIF_VALUE: 15
PIF_VALUE: 17
PIF_VALUE: 19
PIF_VALUE: 22
PIF_VALUE: 18
PIF_VALUE: 17
PIF_VALUE: 18
PIF_VALUE: 26
PIF_VALUE: 21
PIF_VALUE: 18
PIF_VALUE: 22
PIF_VALUE: 18
PIF_VALUE: 19
PIF_VALUE: 14
PIF_VALUE: 21
PIF_VALUE: 26
PIF_VALUE: 18
PIF_VALUE: 19
PIF_VALUE: 20
PIF_VALUE: 19
PIF_VALUE: 18

## 2025-02-20 ASSESSMENT — PAIN SCALES - GENERAL: PAINLEVEL_OUTOF10: 6

## 2025-02-20 NOTE — PLAN OF CARE
Problem: Safety - Adult  Goal: Free from fall injury  2/20/2025 0246 by Kayden Lipscomb RN  Outcome: Progressing  Flowsheets (Taken 2/20/2025 0246)  Free From Fall Injury: Based on caregiver fall risk screen, instruct family/caregiver to ask for assistance with transferring infant if caregiver noted to have fall risk factors  2/19/2025 1948 by Fritsch, Sarah, RN  Outcome: Progressing     Problem: Discharge Planning  Goal: Discharge to home or other facility with appropriate resources  2/20/2025 0246 by Kayden Lipscomb RN  Outcome: Progressing  Flowsheets (Taken 2/19/2025 1030 by Fritsch, Sarah, RN)  Discharge to home or other facility with appropriate resources:   Identify barriers to discharge with patient and caregiver   Arrange for needed discharge resources and transportation as appropriate  2/19/2025 1948 by Fritsch, Sarah, RN  Outcome: Progressing  Flowsheets (Taken 2/19/2025 1030)  Discharge to home or other facility with appropriate resources:   Identify barriers to discharge with patient and caregiver   Arrange for needed discharge resources and transportation as appropriate     Problem: Safety - Medical Restraint  Goal: Remains free of injury from restraints (Restraint for Interference with Medical Device)  Description: INTERVENTIONS:  1. Determine that other, less restrictive measures have been tried or would not be effective before applying the restraint  2. Evaluate the patient's condition at the time of restraint application  3. Inform patient/family regarding the reason for restraint  4. Q2H: Monitor safety, psychosocial status, comfort, nutrition and hydration  2/20/2025 0246 by Kayden Lipscomb RN  Outcome: Progressing  Flowsheets (Taken 2/20/2025 0200)  Remains free of injury from restraints (restraint for interference with medical device):   Determine that other, less restrictive measures have been tried or would not be effective before applying the restraint   Evaluate the

## 2025-02-20 NOTE — PLAN OF CARE
Problem: Safety - Adult  Goal: Free from fall injury  Outcome: Progressing     Problem: Discharge Planning  Goal: Discharge to home or other facility with appropriate resources  Outcome: Progressing  Flowsheets (Taken 2/19/2025 1030)  Discharge to home or other facility with appropriate resources:   Identify barriers to discharge with patient and caregiver   Arrange for needed discharge resources and transportation as appropriate

## 2025-02-20 NOTE — PLAN OF CARE
Problem: Respiratory - Adult  Goal: Achieves optimal ventilation and oxygenation  2/19/2025 2233 by Skyla Bethea, CAYDEN  Outcome: Not Progressing  Flowsheets (Taken 2/19/2025 2233)  Achieves optimal ventilation and oxygenation:   Assess for changes in respiratory status   Position to facilitate oxygenation and minimize respiratory effort   Initiate smoking cessation protocol as indicated   Assess the need for suctioning and aspirate as needed   Respiratory therapy support as indicated   Assess for changes in mentation and behavior   Oxygen supplementation based on oxygen saturation or arterial blood gases   Encourage broncho-pulmonary hygiene including cough, deep breathe, incentive spirometry   Assess and instruct to report shortness of breath or any respiratory difficulty

## 2025-02-20 NOTE — CONSULTS
Department of Neurosurgery                                            Nurse Practitioner Consult Note      Reason for Consult:  Craniectomy watch large left M1 and ANNABEL territory infarct   Requesting Physician:  Kevin CASTLE   Neurosurgeon:   [] Dr. Posadas  [x] Dr. Bradford  [] Dr. Aquino  [] Dr. Patel    Neurosurgery notified of consult at:1242  Neurosurgery evaluation begun: 1242    History Obtained From:  electronic medical record    CHIEF COMPLAINT:         Chief Complaint   Patient presents with    Fall     Found face down in the shower.       HISTORY OF PRESENT ILLNESS:       The patient is a 74 y.o. male with a history of hypertension, hyperlipidemia, CAD s/p CABG x2, chronic diastolic congestive heart failure, type 2 diabetes mellitus who presented to the emergency department following being found minimally responsive at home after an unwitnessed fall. Per patient's son last known well was approximately 9 PM yesterday evening and was heard to have likely fell around 3 to 4 AM.  On arrival to the emergency department patient was noted to be lethargic, opening eyes to repeated stimulation, with right upper and lower extremity hemiplegia and global aphasia, initial NIH 17.  Initial CT head shows a small right cerebellar intraparenchymal hemorrhage with mild surrounding edema and possible left MCA territory hypodensity.  CTA head and neck shows a proximal left M1 occlusion and questionable P4 occlusion/stenosis.  Patient was evaluated by neuroendovascular and was ultimately taken for emergent mechanical thrombectomy of the left M1 one pass TICI 3.  Postprocedure decision was made by anesthesia to leave the patient intubated given low GCS prior to intervention    MRI Brain was obtained post procedure which demonstrated large left middle cerebral artery territory infarct and anterior cerebral artery territory infarct with associated petechial hemorrhage.  There is mild mass effect on the left lateral 
    Endovascular Neurosurgery Consult      Reason for evaluation: L M1 Occlusion     SUBJECTIVE:   History of Chief Complaint:        The patient is a 74 y.o. male who presents after a fall. Was being worked up by trauma team initially but stroke alert called as patient remained unresponsive per notes. LKW listed as unclear. Per son present at bedside, LKW ~ 9PM prior to going to bed but heard the patient fall in the bathroom around ~3-4 AM.  Initial NIH 17.  SBP 170s.  Patient was alert, mute.  Per bedside RN was initially following commands on arrival to ED.  Not following any commands on my assessment.  Initially opening eyes to voice and lethargic, progressively became more somnolent throughout exam.  Had an episode of increased tone in right upper extremity as well as bilateral lower extremities along with an episode of urinary incontinence.  Right upper extremity tone normalized within 10 minutes.  Moving left upper extremity spontaneously however only moving right upper extremity to pain.  Mute with no attempts at speech.  No gaze deviation noted, patient in a c-collar.  Blinking to threat.  History of CABG s/p stents, on aspirin and Brilinta percent.  Not sure what if he took his medications but he is usually compliant with all his medications.  Denies prior history of seizures, stroke.       LKW: ~9 PM per son  NIHSS: 17  Modified [place] Scale: 2  SBP: 170s  Glucose: wnl  CT head without contrast: Completed  TNK: Not a candidate  Endovascular: Taken for emergent thrombectomy      Allergies  has no allergies on file.  Medications  Prior to Admission medications    Not on File    Scheduled Meds:   levETIRAcetam  1,500 mg IntraVENous NOW    sodium chloride flush  5-40 mL IntraVENous 2 times per day     Continuous Infusions:   niCARdipine Stopped (02/19/25 0725)    sodium chloride       PRN Meds:.sodium chloride flush, sodium chloride, potassium chloride **OR** potassium alternative oral replacement **OR** 
  Department of Neurology/Telestroke/Stroke  Resident Consult Note  Stroke Alert Noncritical @ 0554  Arrival at bedside @ 0600    Reason for Consult:  ED stroke alert noncritical   Stroke Team: ED       History Obtained From:  EMR, patient     CHIEF COMPLAINT:       Stroke alert    HISTORY OF PRESENT ILLNESS:       The patient is a 74 y.o. male who presents after a fall. Was being worked up by trauma team initially but stroke alert called as patient remained unresponsive per notes. LKW listed as unclear. Per son present at bedside, LKW ~ 9PM prior to going to bed but heard the patient fall in the bathroom around ~3-4 AM.  Initial NIH 17.  SBP 170s.  Patient was alert, mute.  Per bedside RN was initially following commands on arrival to ED.  Not following any commands on my assessment.  Initially opening eyes to voice and lethargic, progressively became more somnolent throughout exam.  Had an episode of increased tone in right upper extremity as well as bilateral lower extremities along with an episode of urinary incontinence.  Right upper extremity tone normalized within 10 minutes.  Moving left upper extremity spontaneously however only moving right upper extremity to pain.  Mute with no attempts at speech.  No gaze deviation noted, patient in a c-collar.  Blinking to threat.  History of CABG s/p stents, on aspirin and Brilinta percent.  Not sure what if he took his medications but he is usually compliant with all his medications.  Denies prior history of seizures, stroke.      LKW: ~9 PM per son  NIHSS: 17  Modified Holcomb Scale: 2  SBP: 170s  Glucose: wnl  CT head without contrast: Completed  TNK: Not a candidate  Endovascular: Taken for emergent thrombectomy       PAST MEDICAL HISTORY :       Past Medical History:    No past medical history on file.    Past Surgical History:    No past surgical history on file.    Social History:   Social History     Socioeconomic History    Marital status:      Spouse 
compression of the left lateral ventricle.  No  significant change in appearance of the remaining ventricular system.  The  basal cisterns are patent.  Evolving parenchymal hemorrhage within the right  occipital pole.    ORBITS: The visualized portion of the orbits demonstrate no acute abnormality.    SINUSES: The visualized paranasal sinuses and mastoid air cells demonstrate  no acute abnormality.    SOFT TISSUES/SKULL:  No acute abnormality of the visualized skull or soft  tissues.    Impression  1. Large evolving infarct within the left MCA and ANNABEL territories with  scattered petechial hemorrhage.  2. Slight increase in 4 mm rightward midline shift, previously 2 mm.  3. Evolving parenchymal hemorrhage within the right occipital pole.       Xray Result (most recent):  XR CHEST PORTABLE 02/20/2025    Narrative  EXAMINATION:  ONE XRAY VIEW OF THE CHEST    2/20/2025 9:07 am    COMPARISON:  None.    HISTORY:  ORDERING SYSTEM PROVIDED HISTORY: s/p R subclavian line placement  TECHNOLOGIST PROVIDED HISTORY:  s/p R subclavian line placement    FINDINGS:  There is a right-sided subclavian catheter with the tip in the superior vena  cava.  There is an endotracheal tube with the tip above the sania at the  level of the clavicular heads.  No pneumothorax.  Possible trace left pleural  effusion with left basilar atelectasis.  Normal cardiomediastinal silhouette    Impression  1.  Right-sided subclavian catheter with tip in the superior vena cava.    2.  Trace left pleural effusion with left basilar atelectasis       MRI Result (most recent):  MRI BRAIN W WO CONTRAST 02/19/2025 (Preliminary)  This result has not been signed. Information might be incomplete.    Narrative  EXAMINATION:  MRI OF THE BRAIN WITHOUT AND WITH CONTRAST  2/19/2025 5:18 pm    TECHNIQUE:  Multiplanar multisequence MRI of the head/brain was performed without and  with the administration of intravenous contrast.    COMPARISON:  CT head

## 2025-02-21 ENCOUNTER — APPOINTMENT (OUTPATIENT)
Dept: GENERAL RADIOLOGY | Age: 75
DRG: 023 | End: 2025-02-21
Payer: COMMERCIAL

## 2025-02-21 LAB
ALLEN TEST: ABNORMAL
ANION GAP SERPL CALCULATED.3IONS-SCNC: 10 MMOL/L (ref 9–16)
BASOPHILS # BLD: 0 K/UL (ref 0–0.2)
BASOPHILS NFR BLD: 0 % (ref 0–2)
BUN SERPL-MCNC: 18 MG/DL (ref 8–23)
CALCIUM SERPL-MCNC: 7.8 MG/DL (ref 8.6–10.4)
CHLORIDE SERPL-SCNC: 114 MMOL/L (ref 98–107)
CO2 SERPL-SCNC: 21 MMOL/L (ref 20–31)
CREAT SERPL-MCNC: 0.8 MG/DL (ref 0.7–1.2)
ECHO BSA: 2.17 M2
EOSINOPHIL # BLD: 0 K/UL (ref 0–0.44)
EOSINOPHILS RELATIVE PERCENT: 0 % (ref 1–4)
ERYTHROCYTE [DISTWIDTH] IN BLOOD BY AUTOMATED COUNT: 15 % (ref 11.8–14.4)
FIO2: 40
FIO2: 60
GFR, ESTIMATED: >90 ML/MIN/1.73M2
GLUCOSE BLD-MCNC: 182 MG/DL (ref 75–110)
GLUCOSE BLD-MCNC: 185 MG/DL (ref 75–110)
GLUCOSE BLD-MCNC: 191 MG/DL (ref 74–100)
GLUCOSE BLD-MCNC: 193 MG/DL (ref 75–110)
GLUCOSE BLD-MCNC: 208 MG/DL (ref 74–100)
GLUCOSE BLD-MCNC: 236 MG/DL (ref 75–110)
GLUCOSE SERPL-MCNC: 185 MG/DL (ref 74–99)
HCT VFR BLD AUTO: 35.3 % (ref 40.7–50.3)
HGB BLD-MCNC: 11.2 G/DL (ref 13–17)
IMM GRANULOCYTES # BLD AUTO: 0.14 K/UL (ref 0–0.3)
IMM GRANULOCYTES NFR BLD: 1 %
LYMPHOCYTES NFR BLD: 0.86 K/UL (ref 1.1–3.7)
LYMPHOCYTES RELATIVE PERCENT: 6 % (ref 24–43)
MAGNESIUM SERPL-MCNC: 2.1 MG/DL (ref 1.6–2.4)
MCH RBC QN AUTO: 25.9 PG (ref 25.2–33.5)
MCHC RBC AUTO-ENTMCNC: 31.7 G/DL (ref 28.4–34.8)
MCV RBC AUTO: 81.7 FL (ref 82.6–102.9)
MICROORGANISM SPEC CULT: ABNORMAL
MICROORGANISM/AGENT SPEC: ABNORMAL
MODE: ABNORMAL
MONOCYTES NFR BLD: 1.57 K/UL (ref 0.1–1.2)
MONOCYTES NFR BLD: 11 % (ref 3–12)
MORPHOLOGY: ABNORMAL
MORPHOLOGY: ABNORMAL
MRSA, DNA, NASAL: NEGATIVE
NEGATIVE BASE EXCESS, ART: 1.5 MMOL/L (ref 0–2)
NEGATIVE BASE EXCESS, ART: 2.4 MMOL/L (ref 0–2)
NEUTROPHILS NFR BLD: 82 % (ref 36–65)
NEUTS SEG NFR BLD: 11.73 K/UL (ref 1.5–8.1)
NRBC BLD-RTO: 0 PER 100 WBC
O2 DELIVERY DEVICE: ABNORMAL
O2 DELIVERY DEVICE: ABNORMAL
PLATELET # BLD AUTO: 267 K/UL (ref 138–453)
PMV BLD AUTO: 10.2 FL (ref 8.1–13.5)
POC HCO3: 20.5 MMOL/L (ref 21–28)
POC HCO3: 21.7 MMOL/L (ref 21–28)
POC O2 SATURATION: 93.9 % (ref 94–98)
POC O2 SATURATION: 96 % (ref 94–98)
POC PCO2: 29 MM HG (ref 35–48)
POC PCO2: 31.3 MM HG (ref 35–48)
POC PH: 7.45 (ref 7.35–7.45)
POC PH: 7.46 (ref 7.35–7.45)
POC PO2: 65.3 MM HG (ref 83–108)
POC PO2: 76.7 MM HG (ref 83–108)
POTASSIUM SERPL-SCNC: 3.7 MMOL/L (ref 3.7–5.3)
RBC # BLD AUTO: 4.32 M/UL (ref 4.21–5.77)
SAMPLE SITE: ABNORMAL
SAMPLE SITE: ABNORMAL
SODIUM SERPL-SCNC: 141 MMOL/L (ref 136–145)
SODIUM SERPL-SCNC: 145 MMOL/L (ref 136–145)
SODIUM SERPL-SCNC: 146 MMOL/L (ref 136–145)
SODIUM SERPL-SCNC: 148 MMOL/L (ref 136–145)
SPECIMEN DESCRIPTION: ABNORMAL
SPECIMEN DESCRIPTION: NORMAL
WBC OTHER # BLD: 14.3 K/UL (ref 3.5–11.3)

## 2025-02-21 PROCEDURE — 6360000002 HC RX W HCPCS: Performed by: NURSE PRACTITIONER

## 2025-02-21 PROCEDURE — 6370000000 HC RX 637 (ALT 250 FOR IP): Performed by: NURSE PRACTITIONER

## 2025-02-21 PROCEDURE — 83735 ASSAY OF MAGNESIUM: CPT

## 2025-02-21 PROCEDURE — 6360000002 HC RX W HCPCS

## 2025-02-21 PROCEDURE — 6370000000 HC RX 637 (ALT 250 FOR IP): Performed by: PSYCHIATRY & NEUROLOGY

## 2025-02-21 PROCEDURE — 82803 BLOOD GASES ANY COMBINATION: CPT

## 2025-02-21 PROCEDURE — 99232 SBSQ HOSP IP/OBS MODERATE 35: CPT | Performed by: NURSE PRACTITIONER

## 2025-02-21 PROCEDURE — 95718 EEG PHYS/QHP 2-12 HR W/VEEG: CPT | Performed by: PSYCHIATRY & NEUROLOGY

## 2025-02-21 PROCEDURE — 94003 VENT MGMT INPAT SUBQ DAY: CPT

## 2025-02-21 PROCEDURE — 93005 ELECTROCARDIOGRAM TRACING: CPT | Performed by: NURSE PRACTITIONER

## 2025-02-21 PROCEDURE — 2700000000 HC OXYGEN THERAPY PER DAY

## 2025-02-21 PROCEDURE — 2000000000 HC ICU R&B

## 2025-02-21 PROCEDURE — 95715 VEEG EA 12-26HR INTMT MNTR: CPT

## 2025-02-21 PROCEDURE — 99233 SBSQ HOSP IP/OBS HIGH 50: CPT | Performed by: PSYCHIATRY & NEUROLOGY

## 2025-02-21 PROCEDURE — 99291 CRITICAL CARE FIRST HOUR: CPT | Performed by: PSYCHIATRY & NEUROLOGY

## 2025-02-21 PROCEDURE — 80048 BASIC METABOLIC PNL TOTAL CA: CPT

## 2025-02-21 PROCEDURE — 2500000003 HC RX 250 WO HCPCS: Performed by: PSYCHIATRY & NEUROLOGY

## 2025-02-21 PROCEDURE — 84295 ASSAY OF SERUM SODIUM: CPT

## 2025-02-21 PROCEDURE — 2500000003 HC RX 250 WO HCPCS: Performed by: NURSE PRACTITIONER

## 2025-02-21 PROCEDURE — 2580000003 HC RX 258

## 2025-02-21 PROCEDURE — 85025 COMPLETE CBC W/AUTO DIFF WBC: CPT

## 2025-02-21 PROCEDURE — 93970 EXTREMITY STUDY: CPT | Performed by: SURGERY

## 2025-02-21 PROCEDURE — 71045 X-RAY EXAM CHEST 1 VIEW: CPT

## 2025-02-21 PROCEDURE — 37799 UNLISTED PX VASCULAR SURGERY: CPT

## 2025-02-21 PROCEDURE — 94640 AIRWAY INHALATION TREATMENT: CPT

## 2025-02-21 PROCEDURE — 2580000003 HC RX 258: Performed by: PSYCHIATRY & NEUROLOGY

## 2025-02-21 PROCEDURE — 6370000000 HC RX 637 (ALT 250 FOR IP)

## 2025-02-21 PROCEDURE — 2580000003 HC RX 258: Performed by: NURSE PRACTITIONER

## 2025-02-21 PROCEDURE — 94761 N-INVAS EAR/PLS OXIMETRY MLT: CPT

## 2025-02-21 PROCEDURE — 2500000003 HC RX 250 WO HCPCS: Performed by: STUDENT IN AN ORGANIZED HEALTH CARE EDUCATION/TRAINING PROGRAM

## 2025-02-21 PROCEDURE — 36415 COLL VENOUS BLD VENIPUNCTURE: CPT

## 2025-02-21 PROCEDURE — 82947 ASSAY GLUCOSE BLOOD QUANT: CPT

## 2025-02-21 PROCEDURE — 95720 EEG PHY/QHP EA INCR W/VEEG: CPT | Performed by: PSYCHIATRY & NEUROLOGY

## 2025-02-21 RX ORDER — LISINOPRIL 20 MG/1
10 TABLET ORAL DAILY
Status: DISCONTINUED | OUTPATIENT
Start: 2025-02-21 | End: 2025-02-24

## 2025-02-21 RX ORDER — ATORVASTATIN CALCIUM 10 MG/1
40 TABLET, FILM COATED ORAL NIGHTLY
Status: DISCONTINUED | OUTPATIENT
Start: 2025-02-21 | End: 2025-02-24

## 2025-02-21 RX ORDER — IPRATROPIUM BROMIDE AND ALBUTEROL SULFATE 2.5; .5 MG/3ML; MG/3ML
1 SOLUTION RESPIRATORY (INHALATION)
Status: DISCONTINUED | OUTPATIENT
Start: 2025-02-21 | End: 2025-02-24

## 2025-02-21 RX ORDER — ENOXAPARIN SODIUM 100 MG/ML
40 INJECTION SUBCUTANEOUS DAILY
Status: DISCONTINUED | OUTPATIENT
Start: 2025-02-21 | End: 2025-02-24

## 2025-02-21 RX ORDER — FENTANYL CITRATE 50 UG/ML
50 INJECTION, SOLUTION INTRAMUSCULAR; INTRAVENOUS ONCE
Status: COMPLETED | OUTPATIENT
Start: 2025-02-21 | End: 2025-02-21

## 2025-02-21 RX ADMIN — INSULIN LISPRO 2 UNITS: 100 INJECTION, SOLUTION INTRAVENOUS; SUBCUTANEOUS at 01:13

## 2025-02-21 RX ADMIN — INSULIN LISPRO 2 UNITS: 100 INJECTION, SOLUTION INTRAVENOUS; SUBCUTANEOUS at 23:34

## 2025-02-21 RX ADMIN — IPRATROPIUM BROMIDE AND ALBUTEROL SULFATE 1 DOSE: .5; 2.5 SOLUTION RESPIRATORY (INHALATION) at 16:03

## 2025-02-21 RX ADMIN — ATORVASTATIN CALCIUM 40 MG: 10 TABLET, FILM COATED ORAL at 21:48

## 2025-02-21 RX ADMIN — LISINOPRIL 10 MG: 20 TABLET ORAL at 09:47

## 2025-02-21 RX ADMIN — SODIUM CHLORIDE: 0.9 INJECTION, SOLUTION INTRAVENOUS at 23:44

## 2025-02-21 RX ADMIN — FENTANYL CITRATE 50 MCG: 50 INJECTION INTRAMUSCULAR; INTRAVENOUS at 07:27

## 2025-02-21 RX ADMIN — SODIUM CHLORIDE, PRESERVATIVE FREE 40 MG: 5 INJECTION INTRAVENOUS at 08:08

## 2025-02-21 RX ADMIN — SODIUM CHLORIDE 7.5 MG/HR: 9 INJECTION, SOLUTION INTRAVENOUS at 10:43

## 2025-02-21 RX ADMIN — IPRATROPIUM BROMIDE AND ALBUTEROL SULFATE 1 DOSE: .5; 2.5 SOLUTION RESPIRATORY (INHALATION) at 20:18

## 2025-02-21 RX ADMIN — POTASSIUM BICARBONATE 20 MEQ: 782 TABLET, EFFERVESCENT ORAL at 11:14

## 2025-02-21 RX ADMIN — INSULIN LISPRO 2 UNITS: 100 INJECTION, SOLUTION INTRAVENOUS; SUBCUTANEOUS at 17:48

## 2025-02-21 RX ADMIN — CHLORHEXIDINE GLUCONATE 15 ML: 1.2 SOLUTION ORAL at 08:08

## 2025-02-21 RX ADMIN — SODIUM CHLORIDE 3000 MG: 900 INJECTION INTRAVENOUS at 17:41

## 2025-02-21 RX ADMIN — Medication 50 MCG/HR: at 08:15

## 2025-02-21 RX ADMIN — SODIUM CHLORIDE, PRESERVATIVE FREE 10 ML: 5 INJECTION INTRAVENOUS at 08:00

## 2025-02-21 RX ADMIN — SODIUM CHLORIDE 5 MG/HR: 9 INJECTION, SOLUTION INTRAVENOUS at 23:31

## 2025-02-21 RX ADMIN — SODIUM CHLORIDE, PRESERVATIVE FREE 10 ML: 5 INJECTION INTRAVENOUS at 21:48

## 2025-02-21 RX ADMIN — ENOXAPARIN SODIUM 40 MG: 100 INJECTION SUBCUTANEOUS at 11:15

## 2025-02-21 RX ADMIN — SODIUM CHLORIDE 3000 MG: 900 INJECTION INTRAVENOUS at 23:45

## 2025-02-21 RX ADMIN — FENTANYL CITRATE 50 MCG: 50 INJECTION INTRAMUSCULAR; INTRAVENOUS at 07:47

## 2025-02-21 RX ADMIN — SODIUM CHLORIDE 50 ML/HR: 3 INJECTION, SOLUTION INTRAVENOUS at 06:11

## 2025-02-21 RX ADMIN — CHLORHEXIDINE GLUCONATE 15 ML: 1.2 SOLUTION ORAL at 21:48

## 2025-02-21 RX ADMIN — IPRATROPIUM BROMIDE AND ALBUTEROL SULFATE 1 DOSE: .5; 2.5 SOLUTION RESPIRATORY (INHALATION) at 11:44

## 2025-02-21 RX ADMIN — ACETAMINOPHEN 650 MG: 325 TABLET ORAL at 11:13

## 2025-02-21 RX ADMIN — ATORVASTATIN CALCIUM 40 MG: 10 TABLET, FILM COATED ORAL at 01:12

## 2025-02-21 RX ADMIN — SODIUM CHLORIDE 3000 MG: 900 INJECTION INTRAVENOUS at 11:19

## 2025-02-21 RX ADMIN — SODIUM CHLORIDE 50 ML/HR: 3 INJECTION, SOLUTION INTRAVENOUS at 16:16

## 2025-02-21 RX ADMIN — INSULIN LISPRO 2 UNITS: 100 INJECTION, SOLUTION INTRAVENOUS; SUBCUTANEOUS at 13:03

## 2025-02-21 RX ADMIN — INSULIN LISPRO 2 UNITS: 100 INJECTION, SOLUTION INTRAVENOUS; SUBCUTANEOUS at 06:30

## 2025-02-21 RX ADMIN — ACETAMINOPHEN 650 MG: 325 TABLET ORAL at 21:47

## 2025-02-21 ASSESSMENT — PULMONARY FUNCTION TESTS
PIF_VALUE: 25
PIF_VALUE: 25
PIF_VALUE: 20
PIF_VALUE: 20
PIF_VALUE: 24
PIF_VALUE: 24
PIF_VALUE: 23
PIF_VALUE: 25
PIF_VALUE: 27
PIF_VALUE: 30
PIF_VALUE: 22
PIF_VALUE: 25
PIF_VALUE: 24
PIF_VALUE: 27
PIF_VALUE: 18
PIF_VALUE: 28
PIF_VALUE: 30
PIF_VALUE: 14
PIF_VALUE: 20
PIF_VALUE: 24
PIF_VALUE: 26
PIF_VALUE: 25
PIF_VALUE: 15
PIF_VALUE: 24
PIF_VALUE: 28
PIF_VALUE: 25
PIF_VALUE: 19
PIF_VALUE: 22
PIF_VALUE: 25
PIF_VALUE: 21

## 2025-02-21 ASSESSMENT — PAIN SCALES - GENERAL
PAINLEVEL_OUTOF10: 0

## 2025-02-21 NOTE — PLAN OF CARE
Problem: Safety - Adult  Goal: Free from fall injury  Outcome: Progressing  Flowsheets (Taken 2/20/2025 0246)  Free From Fall Injury: Based on caregiver fall risk screen, instruct family/caregiver to ask for assistance with transferring infant if caregiver noted to have fall risk factors     Problem: Discharge Planning  Goal: Discharge to home or other facility with appropriate resources  Outcome: Progressing  Flowsheets (Taken 2/19/2025 1030 by Fritsch, Sarah, RN)  Discharge to home or other facility with appropriate resources:   Identify barriers to discharge with patient and caregiver   Arrange for needed discharge resources and transportation as appropriate     Problem: Safety - Medical Restraint  Goal: Remains free of injury from restraints (Restraint for Interference with Medical Device)  Description: INTERVENTIONS:  1. Determine that other, less restrictive measures have been tried or would not be effective before applying the restraint  2. Evaluate the patient's condition at the time of restraint application  3. Inform patient/family regarding the reason for restraint  4. Q2H: Monitor safety, psychosocial status, comfort, nutrition and hydration  Outcome: Progressing  Flowsheets  Taken 2/21/2025 0200 by Kayden Lipscomb RN  Remains free of injury from restraints (restraint for interference with medical device):   Determine that other, less restrictive measures have been tried or would not be effective before applying the restraint   Evaluate the patient's condition at the time of restraint application   Every 2 hours: Monitor safety, psychosocial status, comfort, nutrition and hydration  Taken 2/21/2025 0033 by Kayden Lipscomb RN  Remains free of injury from restraints (restraint for interference with medical device):   Determine that other, less restrictive measures have been tried or would not be effective before applying the restraint   Evaluate the patient's condition at the time of

## 2025-02-21 NOTE — PROCEDURES
PROCEDURE NOTE  Date: 2/21/2025   Name: Anthony Jacobson  YOB: 1950    Procedures      LONG-TERM EEG-VIDEO MONITORING   CLINICAL NEUROPHYSIOLOGY LABORATORY  DEPARTMENT OF NEUROLOGY  [facility]     Patient: Anthony Jacobson  Age: 74 y.o.  MRN: [id number]    Referring Physician: No ref. provider found  History: The patient is a 74 y.o. male who presented breakthrough seizure/encephalopathy. This long-term video-EEG monitoring study was performed to determine the nature of the patient's clinical events. The patient is on neuroactive medications.   Anthony Jacobson   Current Facility-Administered Medications   Medication Dose Route Frequency Provider Last Rate Last Admin    lisinopril (PRINIVIL;ZESTRIL) tablet 10 mg  10 mg Per NG tube Daily Lisbet Saucedo APRN - CNP        atorvastatin (LIPITOR) tablet 40 mg  40 mg Per NG tube Nightly Lisbet Saucedo APRN - CNP        insulin lispro (HUMALOG,ADMELOG) injection vial 0-8 Units  0-8 Units SubCUTAneous Q6H Lisbet Saucedo APRN - CNP   2 Units at 02/21/25 0630    glucose chewable tablet 16 g  4 tablet Oral PRN Lisbet Saucedo APRN - CNP        dextrose bolus 10% 125 mL  125 mL IntraVENous PRN Lisbet Saucedo APRN - CNP        Or    dextrose bolus 10% 250 mL  250 mL IntraVENous PRN Lisbet Saucedo APRN - CNP        glucagon injection 1 mg  1 mg SubCUTAneous PRN Lisbet Saucedo APRN - CNP        dextrose 10 % infusion   IntraVENous Continuous PRN Lisbet Saucedo APRN - CNP        hydrALAZINE (APRESOLINE) injection 10 mg  10 mg IntraVENous Q1H PRN Lisbet Saucedo APRN - CNP        labetalol (NORMODYNE;TRANDATE) injection 10 mg  10 mg IntraVENous Q1H PRN Lisbet Saucedo APRN - CNP        fentaNYL (SUBLIMAZE) injection 50 mcg  50 mcg IntraVENous Q1H PRN Lisbet Saucedo APRN - CNP   50 mcg at 02/21/25 0727    magnesium sulfate 2000 mg in 50 mL IVPB premix  2,000 mg IntraVENous Once Lisbet Saucedo APRN - CNP        3% sodium chloride (HYPERTONIC) IV

## 2025-02-21 NOTE — ACP (ADVANCE CARE PLANNING)
Advance Care Planning      Palliative Medicine Provider (MD/NP)  Advance Care Planning (ACP) Conversation      Date of Conversation: 25  The patient and/or authorized decision maker consented to a voluntary Advance Care Planning conversation.   Individuals present for the conversation:   Legal healthcare agent named below and Sister Kayy Jacobson    Legal Healthcare Agent(s):    Primary Decision Maker: Giuseppe Jacobson - Child - [phone]    ACP documents available in EMR prior to discussion:  -None    Primary Palliative Diagnosis(es):  Stroke    Conversation Summary:  Patient has durable power of  and living will - these were completed in  when patient was a minor. Patient's primary decision maker is . Secondary decision maker is patient's sister Kayy Jacobson. She is present at the bedside and waives rights to making decisions to allow patient's son Giuseppe to make them. She reports that patient never completed a second DPOAH after Giuseppe was 20. Discussed with ethics.   Discussed code status with both Kayy and Giuseppe. Giuseppe agreeable for DNR-CCA. Code status changed by neurocritical care.     Resuscitation Status:    Code Status: DNR-CCA    Outcomes / Completed Documentation:  An explanation of advance directives and their importance was provided and the following forms completed:    -No new documents completed.    If new document completed, original was provided to patient and/or family member.    Copy was placed for scanning into the [facility] EMR.      I spent 15 minutes providing separately identifiable ACP services with the patient and/or surrogate decision maker in a voluntary, in-person conversation discussing the patient's wishes and goals as detailed in the above note.       Radha Leon, APRN - CNP

## 2025-02-21 NOTE — PLAN OF CARE
Patient family communicated that that would not like surgical intervention if it is warranted, communicated with Dr Bradford, neurosurgery will sign off at this time.  Thanks you for your consult     TRICIA Garcia - CNP

## 2025-02-21 NOTE — CARE COORDINATION
Dx: Fall, initial encounter [W19.XXXA]  Internal carotid artery occlusion, left [I65.22]  Cerebrovascular accident (CVA), unspecified mechanism (HCC) [I63.9]    Admit date: 2/19/2025  GMLOS : 7.3  LOS : 2    [facility] RISK OF UNPLANNED READMISSION 2.0             14.9 Total Score        ______________________________________      Patients goal/ Transitional Planning : Patient remains intubated with minimal improvement. Palliative care involved. Family would like to wait a few more days before making any decisions regarding code status but do not feel trach/PEG is something that they would be interested in. The son has already decided against any neurosurgical intervention.       PT/OT recommendations : n/a        Barriers to discharge : Remains intubated with minimal clinical improvement

## 2025-02-21 NOTE — ACP (ADVANCE CARE PLANNING)
Spoke with patient's son, sister and granddaughter at bedside.  Discussed patient's current status and ongoing interventions.  Family had a lot of questions about patient's possible recovery.  It was explained in simple terms that patient is expected to have significant speech deficits including difficulty speaking and understanding others along with right-sided weakness.  It is unlikely that patient will ever return to living independently  Family does not want any hemicraniectomy or neurosurgical intervention.  They understand that if swelling progresses patient could herniate which could result in death.   It is likely he would need tracheostomy and PEG tube placement to survive this stroke.  Family does not think patient would want tracheostomy or PEG tube placement.  CODE STATUS changed to DNR-CCA.  Family will continue to discuss goals of care.  Palliative care following.    TRICIA Bauer - CNP  Neuro Critical Care  02/21/25 6:01 PM

## 2025-02-22 ENCOUNTER — APPOINTMENT (OUTPATIENT)
Dept: GENERAL RADIOLOGY | Age: 75
DRG: 023 | End: 2025-02-22
Payer: COMMERCIAL

## 2025-02-22 LAB
ALLEN TEST: ABNORMAL
ANION GAP SERPL CALCULATED.3IONS-SCNC: 9 MMOL/L (ref 9–16)
BASOPHILS # BLD: <0.03 K/UL (ref 0–0.2)
BASOPHILS NFR BLD: 0 % (ref 0–2)
BUN SERPL-MCNC: 23 MG/DL (ref 8–23)
CALCIUM SERPL-MCNC: 7.8 MG/DL (ref 8.6–10.4)
CHLORIDE SERPL-SCNC: 124 MMOL/L (ref 98–107)
CO2 SERPL-SCNC: 20 MMOL/L (ref 20–31)
CREAT SERPL-MCNC: 0.8 MG/DL (ref 0.7–1.2)
EOSINOPHIL # BLD: <0.03 K/UL (ref 0–0.44)
EOSINOPHILS RELATIVE PERCENT: 0 % (ref 1–4)
ERYTHROCYTE [DISTWIDTH] IN BLOOD BY AUTOMATED COUNT: 15.7 % (ref 11.8–14.4)
FIO2: 60
GFR, ESTIMATED: >90 ML/MIN/1.73M2
GLUCOSE BLD-MCNC: 240 MG/DL (ref 75–110)
GLUCOSE BLD-MCNC: 261 MG/DL (ref 75–110)
GLUCOSE BLD-MCNC: 268 MG/DL (ref 75–110)
GLUCOSE BLD-MCNC: 310 MG/DL (ref 74–100)
GLUCOSE SERPL-MCNC: 293 MG/DL (ref 74–99)
HCT VFR BLD AUTO: 34.6 % (ref 40.7–50.3)
HGB BLD-MCNC: 11.1 G/DL (ref 13–17)
IMM GRANULOCYTES # BLD AUTO: 0.08 K/UL (ref 0–0.3)
IMM GRANULOCYTES NFR BLD: 1 %
LYMPHOCYTES NFR BLD: 0.7 K/UL (ref 1.1–3.7)
LYMPHOCYTES RELATIVE PERCENT: 5 % (ref 24–43)
MCH RBC QN AUTO: 26.8 PG (ref 25.2–33.5)
MCHC RBC AUTO-ENTMCNC: 32.1 G/DL (ref 28.4–34.8)
MCV RBC AUTO: 83.6 FL (ref 82.6–102.9)
MODE: ABNORMAL
MONOCYTES NFR BLD: 1.16 K/UL (ref 0.1–1.2)
MONOCYTES NFR BLD: 9 % (ref 3–12)
NEGATIVE BASE EXCESS, ART: 3 MMOL/L (ref 0–2)
NEUTROPHILS NFR BLD: 85 % (ref 36–65)
NEUTS SEG NFR BLD: 10.99 K/UL (ref 1.5–8.1)
NRBC BLD-RTO: 0 PER 100 WBC
O2 DELIVERY DEVICE: ABNORMAL
PLATELET # BLD AUTO: 248 K/UL (ref 138–453)
PMV BLD AUTO: 10.4 FL (ref 8.1–13.5)
POC HCO3: 21.1 MMOL/L (ref 21–28)
POC O2 SATURATION: 91 % (ref 94–98)
POC PCO2: 33.6 MM HG (ref 35–48)
POC PH: 7.41 (ref 7.35–7.45)
POC PO2: 60 MM HG (ref 83–108)
POTASSIUM SERPL-SCNC: 3.8 MMOL/L (ref 3.7–5.3)
RBC # BLD AUTO: 4.14 M/UL (ref 4.21–5.77)
RBC # BLD: ABNORMAL 10*6/UL
SAMPLE SITE: ABNORMAL
SODIUM SERPL-SCNC: 150 MMOL/L (ref 136–145)
SODIUM SERPL-SCNC: 153 MMOL/L (ref 136–145)
SODIUM SERPL-SCNC: 156 MMOL/L (ref 136–145)
SODIUM SERPL-SCNC: 158 MMOL/L (ref 136–145)
WBC OTHER # BLD: 13 K/UL (ref 3.5–11.3)

## 2025-02-22 PROCEDURE — 82803 BLOOD GASES ANY COMBINATION: CPT

## 2025-02-22 PROCEDURE — 80048 BASIC METABOLIC PNL TOTAL CA: CPT

## 2025-02-22 PROCEDURE — 2500000003 HC RX 250 WO HCPCS: Performed by: STUDENT IN AN ORGANIZED HEALTH CARE EDUCATION/TRAINING PROGRAM

## 2025-02-22 PROCEDURE — 6370000000 HC RX 637 (ALT 250 FOR IP): Performed by: NURSE PRACTITIONER

## 2025-02-22 PROCEDURE — 99291 CRITICAL CARE FIRST HOUR: CPT | Performed by: PSYCHIATRY & NEUROLOGY

## 2025-02-22 PROCEDURE — 94003 VENT MGMT INPAT SUBQ DAY: CPT

## 2025-02-22 PROCEDURE — 2700000000 HC OXYGEN THERAPY PER DAY

## 2025-02-22 PROCEDURE — 6370000000 HC RX 637 (ALT 250 FOR IP): Performed by: PSYCHIATRY & NEUROLOGY

## 2025-02-22 PROCEDURE — 85025 COMPLETE CBC W/AUTO DIFF WBC: CPT

## 2025-02-22 PROCEDURE — 99233 SBSQ HOSP IP/OBS HIGH 50: CPT | Performed by: PSYCHIATRY & NEUROLOGY

## 2025-02-22 PROCEDURE — 6360000002 HC RX W HCPCS

## 2025-02-22 PROCEDURE — 37799 UNLISTED PX VASCULAR SURGERY: CPT

## 2025-02-22 PROCEDURE — 2500000003 HC RX 250 WO HCPCS: Performed by: NURSE PRACTITIONER

## 2025-02-22 PROCEDURE — 71045 X-RAY EXAM CHEST 1 VIEW: CPT

## 2025-02-22 PROCEDURE — 82947 ASSAY GLUCOSE BLOOD QUANT: CPT

## 2025-02-22 PROCEDURE — 6360000002 HC RX W HCPCS: Performed by: NURSE PRACTITIONER

## 2025-02-22 PROCEDURE — 94640 AIRWAY INHALATION TREATMENT: CPT

## 2025-02-22 PROCEDURE — 2580000003 HC RX 258

## 2025-02-22 PROCEDURE — 2500000003 HC RX 250 WO HCPCS: Performed by: PSYCHIATRY & NEUROLOGY

## 2025-02-22 PROCEDURE — 84295 ASSAY OF SERUM SODIUM: CPT

## 2025-02-22 PROCEDURE — 6370000000 HC RX 637 (ALT 250 FOR IP)

## 2025-02-22 PROCEDURE — 94761 N-INVAS EAR/PLS OXIMETRY MLT: CPT

## 2025-02-22 PROCEDURE — 2000000000 HC ICU R&B

## 2025-02-22 PROCEDURE — 2580000003 HC RX 258: Performed by: NURSE PRACTITIONER

## 2025-02-22 RX ORDER — HYDRALAZINE HYDROCHLORIDE 20 MG/ML
10 INJECTION INTRAMUSCULAR; INTRAVENOUS
Status: DISCONTINUED | OUTPATIENT
Start: 2025-02-22 | End: 2025-02-24

## 2025-02-22 RX ORDER — INSULIN LISPRO 100 [IU]/ML
0-16 INJECTION, SOLUTION INTRAVENOUS; SUBCUTANEOUS EVERY 6 HOURS
Status: DISCONTINUED | OUTPATIENT
Start: 2025-02-22 | End: 2025-02-23

## 2025-02-22 RX ORDER — ASPIRIN 81 MG/1
81 TABLET, CHEWABLE ORAL DAILY
Status: DISCONTINUED | OUTPATIENT
Start: 2025-02-22 | End: 2025-02-24

## 2025-02-22 RX ORDER — LABETALOL HYDROCHLORIDE 5 MG/ML
10 INJECTION, SOLUTION INTRAVENOUS
Status: DISCONTINUED | OUTPATIENT
Start: 2025-02-22 | End: 2025-02-24

## 2025-02-22 RX ADMIN — INSULIN LISPRO 8 UNITS: 100 INJECTION, SOLUTION INTRAVENOUS; SUBCUTANEOUS at 17:58

## 2025-02-22 RX ADMIN — INSULIN LISPRO 8 UNITS: 100 INJECTION, SOLUTION INTRAVENOUS; SUBCUTANEOUS at 12:15

## 2025-02-22 RX ADMIN — ASPIRIN 81 MG: 81 TABLET, CHEWABLE ORAL at 11:58

## 2025-02-22 RX ADMIN — INSULIN LISPRO 4 UNITS: 100 INJECTION, SOLUTION INTRAVENOUS; SUBCUTANEOUS at 20:32

## 2025-02-22 RX ADMIN — SODIUM CHLORIDE 3000 MG: 900 INJECTION INTRAVENOUS at 23:12

## 2025-02-22 RX ADMIN — IPRATROPIUM BROMIDE AND ALBUTEROL SULFATE 1 DOSE: .5; 2.5 SOLUTION RESPIRATORY (INHALATION) at 10:47

## 2025-02-22 RX ADMIN — SODIUM CHLORIDE, PRESERVATIVE FREE 40 MG: 5 INJECTION INTRAVENOUS at 08:10

## 2025-02-22 RX ADMIN — CHLORHEXIDINE GLUCONATE 15 ML: 1.2 SOLUTION ORAL at 19:58

## 2025-02-22 RX ADMIN — SODIUM CHLORIDE, PRESERVATIVE FREE 10 ML: 5 INJECTION INTRAVENOUS at 19:58

## 2025-02-22 RX ADMIN — INSULIN LISPRO 6 UNITS: 100 INJECTION, SOLUTION INTRAVENOUS; SUBCUTANEOUS at 06:30

## 2025-02-22 RX ADMIN — SODIUM CHLORIDE 50 ML/HR: 3 INJECTION, SOLUTION INTRAVENOUS at 03:16

## 2025-02-22 RX ADMIN — SODIUM CHLORIDE 3000 MG: 900 INJECTION INTRAVENOUS at 12:05

## 2025-02-22 RX ADMIN — ENOXAPARIN SODIUM 40 MG: 100 INJECTION SUBCUTANEOUS at 08:10

## 2025-02-22 RX ADMIN — IPRATROPIUM BROMIDE AND ALBUTEROL SULFATE 1 DOSE: .5; 2.5 SOLUTION RESPIRATORY (INHALATION) at 07:51

## 2025-02-22 RX ADMIN — ACETAMINOPHEN 650 MG: 325 TABLET ORAL at 11:58

## 2025-02-22 RX ADMIN — IPRATROPIUM BROMIDE AND ALBUTEROL SULFATE 1 DOSE: .5; 2.5 SOLUTION RESPIRATORY (INHALATION) at 15:13

## 2025-02-22 RX ADMIN — Medication 75 MCG/HR: at 06:03

## 2025-02-22 RX ADMIN — ATORVASTATIN CALCIUM 40 MG: 10 TABLET, FILM COATED ORAL at 19:58

## 2025-02-22 RX ADMIN — IPRATROPIUM BROMIDE AND ALBUTEROL SULFATE 1 DOSE: .5; 2.5 SOLUTION RESPIRATORY (INHALATION) at 19:02

## 2025-02-22 RX ADMIN — SODIUM CHLORIDE 3000 MG: 900 INJECTION INTRAVENOUS at 17:46

## 2025-02-22 RX ADMIN — SODIUM CHLORIDE, PRESERVATIVE FREE 10 ML: 5 INJECTION INTRAVENOUS at 08:21

## 2025-02-22 RX ADMIN — SODIUM CHLORIDE, PRESERVATIVE FREE 10 ML: 5 INJECTION INTRAVENOUS at 19:59

## 2025-02-22 RX ADMIN — SODIUM CHLORIDE 3000 MG: 900 INJECTION INTRAVENOUS at 06:00

## 2025-02-22 RX ADMIN — CHLORHEXIDINE GLUCONATE 15 ML: 1.2 SOLUTION ORAL at 08:10

## 2025-02-22 RX ADMIN — LISINOPRIL 10 MG: 20 TABLET ORAL at 08:10

## 2025-02-22 ASSESSMENT — PULMONARY FUNCTION TESTS
PIF_VALUE: 21
PIF_VALUE: 18
PIF_VALUE: 21
PIF_VALUE: 24
PIF_VALUE: 16
PIF_VALUE: 19
PIF_VALUE: 17
PIF_VALUE: 18
PIF_VALUE: 19
PIF_VALUE: 24

## 2025-02-22 ASSESSMENT — PAIN SCALES - GENERAL
PAINLEVEL_OUTOF10: 0

## 2025-02-22 NOTE — PLAN OF CARE
Problem: Safety - Adult  Goal: Free from fall injury  Outcome: Progressing     Problem: Discharge Planning  Goal: Discharge to home or other facility with appropriate resources  Outcome: Progressing     Problem: Safety - Medical Restraint  Goal: Remains free of injury from restraints (Restraint for Interference with Medical Device)  Description: INTERVENTIONS:  1. Determine that other, less restrictive measures have been tried or would not be effective before applying the restraint  2. Evaluate the patient's condition at the time of restraint application  3. Inform patient/family regarding the reason for restraint  4. Q2H: Monitor safety, psychosocial status, comfort, nutrition and hydration  Outcome: Progressing     Problem: Pain  Goal: Verbalizes/displays adequate comfort level or baseline comfort level  Outcome: Progressing     Problem: Confusion  Goal: Confusion, delirium, dementia, or psychosis is improved or at baseline  Description: INTERVENTIONS:  1. Assess for possible contributors to thought disturbance, including medications, impaired vision or hearing, underlying metabolic abnormalities, dehydration, psychiatric diagnoses, and notify attending LIP  2. [place] high risk fall precautions, as indicated  3. Provide frequent short contacts to provide reality reorientation, refocusing and direction  4. Decrease environmental stimuli, including noise as appropriate  5. Monitor and intervene to maintain adequate nutrition, hydration, elimination, sleep and activity  6. If unable to ensure safety without constant attention obtain sitter and review sitter guidelines with assigned personnel  7. Initiate Psychosocial CNS and Spiritual Care consult, as indicated  Outcome: Progressing     Problem: Skin/Tissue Integrity  Goal: Skin integrity remains intact  Description: 1.  Monitor for areas of redness and/or skin breakdown  2.  Assess vascular access sites hourly  3.  Every 4-6 hours minimum:  Change oxygen

## 2025-02-22 NOTE — PROCEDURES
PROCEDURE NOTE  Date: 2/21/2025   Name: Anthony Jacobson  YOB: 1950    Procedures      LONG-TERM EEG-VIDEO MONITORING   CLINICAL NEUROPHYSIOLOGY LABORATORY  DEPARTMENT OF NEUROLOGY  [facility]     Patient: Anthony Jacobson  Age: 74 y.o.  MRN: [id number]    Referring Physician: No ref. provider found  History: The patient is a 74 y.o. male who presented breakthrough seizure/encephalopathy. This long-term video-EEG monitoring study was performed to determine the nature of the patient's clinical events. The patient is on neuroactive medications.   Anthony Jacobson   Current Facility-Administered Medications   Medication Dose Route Frequency Provider Last Rate Last Admin    lisinopril (PRINIVIL;ZESTRIL) tablet 10 mg  10 mg Per NG tube Daily Lisbet Saucedo APRN - CNP   10 mg at 02/21/25 0947    atorvastatin (LIPITOR) tablet 40 mg  40 mg Per NG tube Nightly Lisbet Saucedo APRN - CNP   40 mg at 02/21/25 2148    ipratropium 0.5 mg-albuterol 2.5 mg (DUONEB) nebulizer solution 1 Dose  1 Dose Inhalation Q4H RT Lisbet Saucedo APRN - CNP   1 Dose at 02/21/25 2018    fentaNYL 2500 mcg/ 50 mL IV infusion   mcg/hr IntraVENous Continuous Lisbet Saucedo APRN - CNP 1.5 mL/hr at 02/21/25 1855 75 mcg/hr at 02/21/25 1855    ampicillin-sulbactam (UNASYN) 3,000 mg in sodium chloride 0.9 % 100 mL IVPB (mini-bag)  3,000 mg IntraVENous Q6H Lisbet Saucedo APRN - CNP   Stopped at 02/21/25 1811    enoxaparin (LOVENOX) injection 40 mg  40 mg SubCUTAneous Daily Lisbet Saucedo APRN - CNP   40 mg at 02/21/25 1115    insulin lispro (HUMALOG,ADMELOG) injection vial 0-8 Units  0-8 Units SubCUTAneous Q6H Lisbet Saucedo APRN - CNP   2 Units at 02/21/25 1748    glucose chewable tablet 16 g  4 tablet Oral PRN Lisbet Saucedo APRN - CNP        dextrose bolus 10% 125 mL  125 mL IntraVENous PRN Lisbet Saucedo APRN - CNP        Or    dextrose bolus 10% 250 mL  250 mL IntraVENous PRN Lisbet Saucedo APRN - CNP

## 2025-02-22 NOTE — PLAN OF CARE
Problem: Respiratory - Adult  Goal: Achieves optimal ventilation and oxygenation  Outcome: Progressing  Flowsheets (Taken 2/22/2025 0751)  Achieves optimal ventilation and oxygenation:   Assess for changes in respiratory status   Oxygen supplementation based on oxygen saturation or arterial blood gases   Assess the need for suctioning and aspirate as needed   Respiratory therapy support as indicated

## 2025-02-23 ENCOUNTER — APPOINTMENT (OUTPATIENT)
Dept: GENERAL RADIOLOGY | Age: 75
DRG: 023 | End: 2025-02-23
Payer: COMMERCIAL

## 2025-02-23 LAB
ANION GAP SERPL CALCULATED.3IONS-SCNC: 9 MMOL/L (ref 9–16)
BASOPHILS # BLD: 0.05 K/UL (ref 0–0.2)
BASOPHILS NFR BLD: 0 % (ref 0–2)
BUN SERPL-MCNC: 24 MG/DL (ref 8–23)
CALCIUM SERPL-MCNC: 8 MG/DL (ref 8.6–10.4)
CHLORIDE SERPL-SCNC: 124 MMOL/L (ref 98–107)
CO2 SERPL-SCNC: 22 MMOL/L (ref 20–31)
CREAT SERPL-MCNC: 0.8 MG/DL (ref 0.7–1.2)
EKG ATRIAL RATE: 92 BPM
EKG P AXIS: 82 DEGREES
EKG P-R INTERVAL: 128 MS
EKG Q-T INTERVAL: 398 MS
EKG QRS DURATION: 88 MS
EKG QTC CALCULATION (BAZETT): 492 MS
EKG R AXIS: 11 DEGREES
EKG T AXIS: 160 DEGREES
EKG VENTRICULAR RATE: 92 BPM
EOSINOPHIL # BLD: <0.03 K/UL (ref 0–0.44)
EOSINOPHILS RELATIVE PERCENT: 0 % (ref 1–4)
ERYTHROCYTE [DISTWIDTH] IN BLOOD BY AUTOMATED COUNT: 15.9 % (ref 11.8–14.4)
GFR, ESTIMATED: >90 ML/MIN/1.73M2
GLUCOSE BLD-MCNC: 218 MG/DL (ref 75–110)
GLUCOSE BLD-MCNC: 237 MG/DL (ref 75–110)
GLUCOSE BLD-MCNC: 237 MG/DL (ref 75–110)
GLUCOSE BLD-MCNC: 250 MG/DL (ref 75–110)
GLUCOSE BLD-MCNC: 251 MG/DL (ref 75–110)
GLUCOSE SERPL-MCNC: 277 MG/DL (ref 74–99)
HCT VFR BLD AUTO: 34.1 % (ref 40.7–50.3)
HGB BLD-MCNC: 10.7 G/DL (ref 13–17)
IMM GRANULOCYTES # BLD AUTO: 0.17 K/UL (ref 0–0.3)
IMM GRANULOCYTES NFR BLD: 1 %
LYMPHOCYTES NFR BLD: 0.87 K/UL (ref 1.1–3.7)
LYMPHOCYTES RELATIVE PERCENT: 7 % (ref 24–43)
MCH RBC QN AUTO: 26.5 PG (ref 25.2–33.5)
MCHC RBC AUTO-ENTMCNC: 31.4 G/DL (ref 28.4–34.8)
MCV RBC AUTO: 84.4 FL (ref 82.6–102.9)
MONOCYTES NFR BLD: 1.24 K/UL (ref 0.1–1.2)
MONOCYTES NFR BLD: 10 % (ref 3–12)
NEUTROPHILS NFR BLD: 82 % (ref 36–65)
NEUTS SEG NFR BLD: 10.62 K/UL (ref 1.5–8.1)
NRBC BLD-RTO: 0 PER 100 WBC
PLATELET # BLD AUTO: 275 K/UL (ref 138–453)
PMV BLD AUTO: 10.7 FL (ref 8.1–13.5)
POTASSIUM SERPL-SCNC: 3.6 MMOL/L (ref 3.7–5.3)
RBC # BLD AUTO: 4.04 M/UL (ref 4.21–5.77)
RBC # BLD: ABNORMAL 10*6/UL
SODIUM SERPL-SCNC: 155 MMOL/L (ref 136–145)
SODIUM SERPL-SCNC: 155 MMOL/L (ref 136–145)
SODIUM SERPL-SCNC: 157 MMOL/L (ref 136–145)
SODIUM SERPL-SCNC: 157 MMOL/L (ref 136–145)
WBC OTHER # BLD: 13 K/UL (ref 3.5–11.3)

## 2025-02-23 PROCEDURE — 6370000000 HC RX 637 (ALT 250 FOR IP)

## 2025-02-23 PROCEDURE — 94761 N-INVAS EAR/PLS OXIMETRY MLT: CPT

## 2025-02-23 PROCEDURE — 2580000003 HC RX 258

## 2025-02-23 PROCEDURE — 6360000002 HC RX W HCPCS

## 2025-02-23 PROCEDURE — 84295 ASSAY OF SERUM SODIUM: CPT

## 2025-02-23 PROCEDURE — 99233 SBSQ HOSP IP/OBS HIGH 50: CPT | Performed by: PSYCHIATRY & NEUROLOGY

## 2025-02-23 PROCEDURE — 71045 X-RAY EXAM CHEST 1 VIEW: CPT

## 2025-02-23 PROCEDURE — 6360000002 HC RX W HCPCS: Performed by: NURSE PRACTITIONER

## 2025-02-23 PROCEDURE — 82947 ASSAY GLUCOSE BLOOD QUANT: CPT

## 2025-02-23 PROCEDURE — 2000000000 HC ICU R&B

## 2025-02-23 PROCEDURE — 2700000000 HC OXYGEN THERAPY PER DAY

## 2025-02-23 PROCEDURE — 6370000000 HC RX 637 (ALT 250 FOR IP): Performed by: NURSE PRACTITIONER

## 2025-02-23 PROCEDURE — 94640 AIRWAY INHALATION TREATMENT: CPT

## 2025-02-23 PROCEDURE — 2500000003 HC RX 250 WO HCPCS: Performed by: PSYCHIATRY & NEUROLOGY

## 2025-02-23 PROCEDURE — 6370000000 HC RX 637 (ALT 250 FOR IP): Performed by: PSYCHIATRY & NEUROLOGY

## 2025-02-23 PROCEDURE — 99291 CRITICAL CARE FIRST HOUR: CPT | Performed by: PSYCHIATRY & NEUROLOGY

## 2025-02-23 PROCEDURE — 85025 COMPLETE CBC W/AUTO DIFF WBC: CPT

## 2025-02-23 PROCEDURE — 80048 BASIC METABOLIC PNL TOTAL CA: CPT

## 2025-02-23 PROCEDURE — 94003 VENT MGMT INPAT SUBQ DAY: CPT

## 2025-02-23 PROCEDURE — 93010 ELECTROCARDIOGRAM REPORT: CPT | Performed by: INTERNAL MEDICINE

## 2025-02-23 PROCEDURE — 2500000003 HC RX 250 WO HCPCS: Performed by: STUDENT IN AN ORGANIZED HEALTH CARE EDUCATION/TRAINING PROGRAM

## 2025-02-23 PROCEDURE — 2580000003 HC RX 258: Performed by: NURSE PRACTITIONER

## 2025-02-23 PROCEDURE — 36415 COLL VENOUS BLD VENIPUNCTURE: CPT

## 2025-02-23 RX ORDER — INSULIN LISPRO 100 [IU]/ML
0-16 INJECTION, SOLUTION INTRAVENOUS; SUBCUTANEOUS EVERY 4 HOURS
Status: DISCONTINUED | OUTPATIENT
Start: 2025-02-23 | End: 2025-02-24

## 2025-02-23 RX ORDER — INSULIN GLARGINE 100 [IU]/ML
12 INJECTION, SOLUTION SUBCUTANEOUS NIGHTLY
Status: DISCONTINUED | OUTPATIENT
Start: 2025-02-23 | End: 2025-02-24

## 2025-02-23 RX ADMIN — HYDRALAZINE HYDROCHLORIDE 10 MG: 20 INJECTION INTRAMUSCULAR; INTRAVENOUS at 20:38

## 2025-02-23 RX ADMIN — LISINOPRIL 10 MG: 20 TABLET ORAL at 08:57

## 2025-02-23 RX ADMIN — INSULIN LISPRO 8 UNITS: 100 INJECTION, SOLUTION INTRAVENOUS; SUBCUTANEOUS at 20:42

## 2025-02-23 RX ADMIN — SODIUM CHLORIDE 3000 MG: 900 INJECTION INTRAVENOUS at 17:56

## 2025-02-23 RX ADMIN — IPRATROPIUM BROMIDE AND ALBUTEROL SULFATE 1 DOSE: .5; 2.5 SOLUTION RESPIRATORY (INHALATION) at 12:20

## 2025-02-23 RX ADMIN — SODIUM CHLORIDE 3000 MG: 900 INJECTION INTRAVENOUS at 05:33

## 2025-02-23 RX ADMIN — INSULIN LISPRO 4 UNITS: 100 INJECTION, SOLUTION INTRAVENOUS; SUBCUTANEOUS at 16:28

## 2025-02-23 RX ADMIN — CHLORHEXIDINE GLUCONATE 15 ML: 1.2 SOLUTION ORAL at 20:35

## 2025-02-23 RX ADMIN — IPRATROPIUM BROMIDE AND ALBUTEROL SULFATE 1 DOSE: .5; 2.5 SOLUTION RESPIRATORY (INHALATION) at 09:05

## 2025-02-23 RX ADMIN — Medication 10 MG: at 13:02

## 2025-02-23 RX ADMIN — SODIUM CHLORIDE, PRESERVATIVE FREE 10 ML: 5 INJECTION INTRAVENOUS at 20:36

## 2025-02-23 RX ADMIN — INSULIN GLARGINE 12 UNITS: 100 INJECTION, SOLUTION SUBCUTANEOUS at 20:43

## 2025-02-23 RX ADMIN — INSULIN LISPRO 4 UNITS: 100 INJECTION, SOLUTION INTRAVENOUS; SUBCUTANEOUS at 08:58

## 2025-02-23 RX ADMIN — HYDRALAZINE HYDROCHLORIDE 10 MG: 20 INJECTION INTRAMUSCULAR; INTRAVENOUS at 02:07

## 2025-02-23 RX ADMIN — ACETAMINOPHEN 650 MG: 325 TABLET ORAL at 03:28

## 2025-02-23 RX ADMIN — CHLORHEXIDINE GLUCONATE 15 ML: 1.2 SOLUTION ORAL at 09:23

## 2025-02-23 RX ADMIN — Medication 10 MG: at 15:12

## 2025-02-23 RX ADMIN — FENTANYL CITRATE 50 MCG: 50 INJECTION INTRAMUSCULAR; INTRAVENOUS at 02:12

## 2025-02-23 RX ADMIN — IPRATROPIUM BROMIDE AND ALBUTEROL SULFATE 1 DOSE: .5; 2.5 SOLUTION RESPIRATORY (INHALATION) at 16:00

## 2025-02-23 RX ADMIN — SODIUM CHLORIDE, PRESERVATIVE FREE 40 MG: 5 INJECTION INTRAVENOUS at 09:07

## 2025-02-23 RX ADMIN — SODIUM CHLORIDE, PRESERVATIVE FREE 10 ML: 5 INJECTION INTRAVENOUS at 09:23

## 2025-02-23 RX ADMIN — Medication 10 MG: at 22:02

## 2025-02-23 RX ADMIN — INSULIN LISPRO 4 UNITS: 100 INJECTION, SOLUTION INTRAVENOUS; SUBCUTANEOUS at 03:14

## 2025-02-23 RX ADMIN — SODIUM CHLORIDE 3000 MG: 900 INJECTION INTRAVENOUS at 10:56

## 2025-02-23 RX ADMIN — IPRATROPIUM BROMIDE AND ALBUTEROL SULFATE 1 DOSE: .5; 2.5 SOLUTION RESPIRATORY (INHALATION) at 04:09

## 2025-02-23 RX ADMIN — FENTANYL CITRATE 50 MCG: 50 INJECTION INTRAMUSCULAR; INTRAVENOUS at 21:09

## 2025-02-23 RX ADMIN — IPRATROPIUM BROMIDE AND ALBUTEROL SULFATE 1 DOSE: .5; 2.5 SOLUTION RESPIRATORY (INHALATION) at 20:10

## 2025-02-23 RX ADMIN — INSULIN LISPRO 8 UNITS: 100 INJECTION, SOLUTION INTRAVENOUS; SUBCUTANEOUS at 11:55

## 2025-02-23 RX ADMIN — POTASSIUM BICARBONATE 40 MEQ: 782 TABLET, EFFERVESCENT ORAL at 08:58

## 2025-02-23 RX ADMIN — ENOXAPARIN SODIUM 40 MG: 100 INJECTION SUBCUTANEOUS at 08:57

## 2025-02-23 RX ADMIN — SODIUM CHLORIDE 3000 MG: 900 INJECTION INTRAVENOUS at 23:24

## 2025-02-23 RX ADMIN — ASPIRIN 81 MG: 81 TABLET, CHEWABLE ORAL at 08:57

## 2025-02-23 RX ADMIN — ATORVASTATIN CALCIUM 40 MG: 10 TABLET, FILM COATED ORAL at 20:30

## 2025-02-23 RX ADMIN — SODIUM CHLORIDE, PRESERVATIVE FREE 10 ML: 5 INJECTION INTRAVENOUS at 20:35

## 2025-02-23 RX ADMIN — FENTANYL CITRATE 50 MCG: 50 INJECTION INTRAMUSCULAR; INTRAVENOUS at 19:06

## 2025-02-23 ASSESSMENT — PULMONARY FUNCTION TESTS
PIF_VALUE: 24
PIF_VALUE: 27
PIF_VALUE: 26
PIF_VALUE: 19
PIF_VALUE: 20
PIF_VALUE: 22
PIF_VALUE: 19
PIF_VALUE: 22
PIF_VALUE: 25
PIF_VALUE: 27
PIF_VALUE: 27
PIF_VALUE: 29
PIF_VALUE: 16
PIF_VALUE: 16

## 2025-02-23 NOTE — PLAN OF CARE
Problem: Safety - Adult  Goal: Free from fall injury  2/23/2025 0608 by Melva Sanchez RN  Outcome: Progressing  2/22/2025 1735 by Susanna Chapa RN  Outcome: Progressing     Problem: Discharge Planning  Goal: Discharge to home or other facility with appropriate resources  2/23/2025 0608 by Melva Sanchez RN  Outcome: Progressing  2/22/2025 1735 by Susanna Chapa RN  Outcome: Progressing     Problem: Safety - Medical Restraint  Goal: Remains free of injury from restraints (Restraint for Interference with Medical Device)  Description: INTERVENTIONS:  1. Determine that other, less restrictive measures have been tried or would not be effective before applying the restraint  2. Evaluate the patient's condition at the time of restraint application  3. Inform patient/family regarding the reason for restraint  4. Q2H: Monitor safety, psychosocial status, comfort, nutrition and hydration  2/23/2025 0608 by Melva Sanchez RN  Outcome: Progressing  Flowsheets  Taken 2/23/2025 0600  Remains free of injury from restraints (restraint for interference with medical device): Determine that other, less restrictive measures have been tried or would not be effective before applying the restraint  Taken 2/23/2025 0400  Remains free of injury from restraints (restraint for interference with medical device): Determine that other, less restrictive measures have been tried or would not be effective before applying the restraint  Taken 2/23/2025 0200  Remains free of injury from restraints (restraint for interference with medical device): Determine that other, less restrictive measures have been tried or would not be effective before applying the restraint  Taken 2/23/2025 0000  Remains free of injury from restraints (restraint for interference with medical device): Determine that other, less restrictive measures have been tried or would not be effective before applying the restraint  Taken 2/22/2025 2200  Remains free of injury

## 2025-02-23 NOTE — PLAN OF CARE
Problem: Safety - Adult  Goal: Free from fall injury  2/23/2025 1652 by Susanna Chapa RN  Outcome: Progressing  2/23/2025 0608 by Melva Sanchez RN  Outcome: Progressing     Problem: Discharge Planning  Goal: Discharge to home or other facility with appropriate resources  2/23/2025 1652 by Susanna Chapa RN  Outcome: Progressing  2/23/2025 0608 by Melva Sanchez RN  Outcome: Progressing     Problem: Safety - Medical Restraint  Goal: Remains free of injury from restraints (Restraint for Interference with Medical Device)  Description: INTERVENTIONS:  1. Determine that other, less restrictive measures have been tried or would not be effective before applying the restraint  2. Evaluate the patient's condition at the time of restraint application  3. Inform patient/family regarding the reason for restraint  4. Q2H: Monitor safety, psychosocial status, comfort, nutrition and hydration  2/23/2025 1652 by Susanna Chapa RN  Outcome: Progressing  2/23/2025 0608 by Melva Sanchez RN  Outcome: Progressing  Flowsheets  Taken 2/23/2025 0600  Remains free of injury from restraints (restraint for interference with medical device): Determine that other, less restrictive measures have been tried or would not be effective before applying the restraint  Taken 2/23/2025 0400  Remains free of injury from restraints (restraint for interference with medical device): Determine that other, less restrictive measures have been tried or would not be effective before applying the restraint  Taken 2/23/2025 0200  Remains free of injury from restraints (restraint for interference with medical device): Determine that other, less restrictive measures have been tried or would not be effective before applying the restraint  Taken 2/23/2025 0000  Remains free of injury from restraints (restraint for interference with medical device): Determine that other, less restrictive measures have been tried or would not be effective before

## 2025-02-23 NOTE — ED NOTES
SW following to complete the PHQ-9 depression screen when patient is able to participate.  YOKO Sandra

## 2025-02-24 ENCOUNTER — APPOINTMENT (OUTPATIENT)
Dept: VASCULAR LAB | Age: 75
DRG: 023 | End: 2025-02-24
Payer: COMMERCIAL

## 2025-02-24 ENCOUNTER — APPOINTMENT (OUTPATIENT)
Dept: GENERAL RADIOLOGY | Age: 75
DRG: 023 | End: 2025-02-24
Payer: COMMERCIAL

## 2025-02-24 LAB
ANION GAP SERPL CALCULATED.3IONS-SCNC: 10 MMOL/L (ref 9–16)
BASOPHILS # BLD: 0 K/UL (ref 0–0.2)
BASOPHILS NFR BLD: 0 % (ref 0–2)
BUN SERPL-MCNC: 25 MG/DL (ref 8–23)
CALCIUM SERPL-MCNC: 8.3 MG/DL (ref 8.6–10.4)
CHLORIDE SERPL-SCNC: 124 MMOL/L (ref 98–107)
CO2 SERPL-SCNC: 23 MMOL/L (ref 20–31)
CREAT SERPL-MCNC: 0.9 MG/DL (ref 0.7–1.2)
EOSINOPHIL # BLD: 0.13 K/UL (ref 0–0.4)
EOSINOPHILS RELATIVE PERCENT: 1 % (ref 1–4)
ERYTHROCYTE [DISTWIDTH] IN BLOOD BY AUTOMATED COUNT: 16.2 % (ref 11.8–14.4)
GFR, ESTIMATED: >90 ML/MIN/1.73M2
GLUCOSE BLD-MCNC: 231 MG/DL (ref 75–110)
GLUCOSE BLD-MCNC: 232 MG/DL (ref 75–110)
GLUCOSE BLD-MCNC: 233 MG/DL (ref 75–110)
GLUCOSE BLD-MCNC: 260 MG/DL (ref 75–110)
GLUCOSE SERPL-MCNC: 256 MG/DL (ref 74–99)
HCT VFR BLD AUTO: 35.8 % (ref 40.7–50.3)
HGB BLD-MCNC: 11.1 G/DL (ref 13–17)
IMM GRANULOCYTES # BLD AUTO: 0 K/UL (ref 0–0.3)
IMM GRANULOCYTES NFR BLD: 0 %
LYMPHOCYTES NFR BLD: 0.88 K/UL (ref 1–4.8)
LYMPHOCYTES RELATIVE PERCENT: 7 % (ref 24–44)
MAGNESIUM SERPL-MCNC: 2.3 MG/DL (ref 1.6–2.4)
MCH RBC QN AUTO: 27 PG (ref 25.2–33.5)
MCHC RBC AUTO-ENTMCNC: 31 G/DL (ref 28.4–34.8)
MCV RBC AUTO: 87.1 FL (ref 82.6–102.9)
MONOCYTES NFR BLD: 0.63 K/UL (ref 0.1–0.8)
MONOCYTES NFR BLD: 5 % (ref 1–7)
MORPHOLOGY: ABNORMAL
NEUTROPHILS NFR BLD: 87 % (ref 36–66)
NEUTS SEG NFR BLD: 10.86 K/UL (ref 1.8–7.7)
NRBC BLD-RTO: 0.4 PER 100 WBC
NUCLEATED RED BLOOD CELLS: 2 PER 100 WBC
PLATELET # BLD AUTO: 270 K/UL (ref 138–453)
PMV BLD AUTO: 10.3 FL (ref 8.1–13.5)
POTASSIUM SERPL-SCNC: 3.7 MMOL/L (ref 3.7–5.3)
RBC # BLD AUTO: 4.11 M/UL (ref 4.21–5.77)
SODIUM SERPL-SCNC: 157 MMOL/L (ref 136–145)
SODIUM SERPL-SCNC: 158 MMOL/L (ref 136–145)
WBC OTHER # BLD: 12.5 K/UL (ref 3.5–11.3)

## 2025-02-24 PROCEDURE — APPNB60 APP NON BILLABLE TIME 46-60 MINS: Performed by: NURSE PRACTITIONER

## 2025-02-24 PROCEDURE — 6370000000 HC RX 637 (ALT 250 FOR IP): Performed by: PSYCHIATRY & NEUROLOGY

## 2025-02-24 PROCEDURE — 99233 SBSQ HOSP IP/OBS HIGH 50: CPT | Performed by: PSYCHIATRY & NEUROLOGY

## 2025-02-24 PROCEDURE — 6370000000 HC RX 637 (ALT 250 FOR IP)

## 2025-02-24 PROCEDURE — 94640 AIRWAY INHALATION TREATMENT: CPT

## 2025-02-24 PROCEDURE — 99233 SBSQ HOSP IP/OBS HIGH 50: CPT | Performed by: NURSE PRACTITIONER

## 2025-02-24 PROCEDURE — 2500000003 HC RX 250 WO HCPCS: Performed by: PSYCHIATRY & NEUROLOGY

## 2025-02-24 PROCEDURE — 2700000000 HC OXYGEN THERAPY PER DAY

## 2025-02-24 PROCEDURE — 84295 ASSAY OF SERUM SODIUM: CPT

## 2025-02-24 PROCEDURE — 6360000002 HC RX W HCPCS

## 2025-02-24 PROCEDURE — 6370000000 HC RX 637 (ALT 250 FOR IP): Performed by: NURSE PRACTITIONER

## 2025-02-24 PROCEDURE — 82947 ASSAY GLUCOSE BLOOD QUANT: CPT

## 2025-02-24 PROCEDURE — 94003 VENT MGMT INPAT SUBQ DAY: CPT

## 2025-02-24 PROCEDURE — 36415 COLL VENOUS BLD VENIPUNCTURE: CPT

## 2025-02-24 PROCEDURE — 2580000003 HC RX 258: Performed by: NURSE PRACTITIONER

## 2025-02-24 PROCEDURE — 85025 COMPLETE CBC W/AUTO DIFF WBC: CPT

## 2025-02-24 PROCEDURE — 83735 ASSAY OF MAGNESIUM: CPT

## 2025-02-24 PROCEDURE — 2500000003 HC RX 250 WO HCPCS: Performed by: STUDENT IN AN ORGANIZED HEALTH CARE EDUCATION/TRAINING PROGRAM

## 2025-02-24 PROCEDURE — 6360000002 HC RX W HCPCS: Performed by: NURSE PRACTITIONER

## 2025-02-24 PROCEDURE — 93971 EXTREMITY STUDY: CPT

## 2025-02-24 PROCEDURE — 2000000000 HC ICU R&B

## 2025-02-24 PROCEDURE — 94761 N-INVAS EAR/PLS OXIMETRY MLT: CPT

## 2025-02-24 PROCEDURE — 80048 BASIC METABOLIC PNL TOTAL CA: CPT

## 2025-02-24 PROCEDURE — 71045 X-RAY EXAM CHEST 1 VIEW: CPT

## 2025-02-24 RX ORDER — LORAZEPAM 2 MG/ML
1 INJECTION INTRAMUSCULAR EVERY 4 HOURS PRN
Status: DISCONTINUED | OUTPATIENT
Start: 2025-02-24 | End: 2025-02-25 | Stop reason: HOSPADM

## 2025-02-24 RX ORDER — GLYCOPYRROLATE 0.2 MG/ML
0.2 INJECTION INTRAMUSCULAR; INTRAVENOUS EVERY 4 HOURS PRN
Status: DISCONTINUED | OUTPATIENT
Start: 2025-02-24 | End: 2025-02-25 | Stop reason: HOSPADM

## 2025-02-24 RX ORDER — POLYETHYLENE GLYCOL 3350 17 G/17G
17 POWDER, FOR SOLUTION ORAL ONCE
Status: COMPLETED | OUTPATIENT
Start: 2025-02-24 | End: 2025-02-24

## 2025-02-24 RX ORDER — LISINOPRIL 20 MG/1
20 TABLET ORAL DAILY
Status: DISCONTINUED | OUTPATIENT
Start: 2025-02-24 | End: 2025-02-24

## 2025-02-24 RX ORDER — MORPHINE SULFATE 2 MG/ML
2 INJECTION, SOLUTION INTRAMUSCULAR; INTRAVENOUS
Status: DISCONTINUED | OUTPATIENT
Start: 2025-02-24 | End: 2025-02-25 | Stop reason: HOSPADM

## 2025-02-24 RX ORDER — SENNOSIDES 8.8 MG/5ML
5 LIQUID ORAL NIGHTLY
Status: DISCONTINUED | OUTPATIENT
Start: 2025-02-24 | End: 2025-02-24

## 2025-02-24 RX ORDER — MORPHINE SULFATE 4 MG/ML
4 INJECTION, SOLUTION INTRAMUSCULAR; INTRAVENOUS
Status: DISCONTINUED | OUTPATIENT
Start: 2025-02-24 | End: 2025-02-25 | Stop reason: HOSPADM

## 2025-02-24 RX ORDER — LANSOPRAZOLE 30 MG/1
30 TABLET, ORALLY DISINTEGRATING, DELAYED RELEASE ORAL
Status: DISCONTINUED | OUTPATIENT
Start: 2025-02-24 | End: 2025-02-24

## 2025-02-24 RX ADMIN — CHLORHEXIDINE GLUCONATE 15 ML: 1.2 SOLUTION ORAL at 08:19

## 2025-02-24 RX ADMIN — INSULIN LISPRO 4 UNITS: 100 INJECTION, SOLUTION INTRAVENOUS; SUBCUTANEOUS at 01:21

## 2025-02-24 RX ADMIN — SODIUM CHLORIDE 3000 MG: 900 INJECTION INTRAVENOUS at 11:37

## 2025-02-24 RX ADMIN — IPRATROPIUM BROMIDE AND ALBUTEROL SULFATE 1 DOSE: .5; 2.5 SOLUTION RESPIRATORY (INHALATION) at 03:53

## 2025-02-24 RX ADMIN — SODIUM CHLORIDE, PRESERVATIVE FREE 10 ML: 5 INJECTION INTRAVENOUS at 08:23

## 2025-02-24 RX ADMIN — POLYETHYLENE GLYCOL 3350 17 G: 17 POWDER, FOR SOLUTION ORAL at 10:23

## 2025-02-24 RX ADMIN — SODIUM CHLORIDE, PRESERVATIVE FREE 10 ML: 5 INJECTION INTRAVENOUS at 20:00

## 2025-02-24 RX ADMIN — Medication 1 MG: at 21:31

## 2025-02-24 RX ADMIN — INSULIN LISPRO 4 UNITS: 100 INJECTION, SOLUTION INTRAVENOUS; SUBCUTANEOUS at 08:29

## 2025-02-24 RX ADMIN — IPRATROPIUM BROMIDE AND ALBUTEROL SULFATE 1 DOSE: .5; 2.5 SOLUTION RESPIRATORY (INHALATION) at 11:23

## 2025-02-24 RX ADMIN — Medication 1 MG: at 15:28

## 2025-02-24 RX ADMIN — LISINOPRIL 20 MG: 20 TABLET ORAL at 08:20

## 2025-02-24 RX ADMIN — DOCUSATE SODIUM LIQUID 100 MG: 100 LIQUID ORAL at 10:24

## 2025-02-24 RX ADMIN — LANSOPRAZOLE 30 MG: 30 TABLET, ORALLY DISINTEGRATING, DELAYED RELEASE ORAL at 08:20

## 2025-02-24 RX ADMIN — MORPHINE SULFATE 2 MG: 2 INJECTION, SOLUTION INTRAMUSCULAR; INTRAVENOUS at 15:28

## 2025-02-24 RX ADMIN — ASPIRIN 81 MG: 81 TABLET, CHEWABLE ORAL at 08:20

## 2025-02-24 RX ADMIN — MORPHINE SULFATE 2 MG: 2 INJECTION, SOLUTION INTRAMUSCULAR; INTRAVENOUS at 21:30

## 2025-02-24 RX ADMIN — Medication 10 MG: at 02:05

## 2025-02-24 RX ADMIN — POTASSIUM BICARBONATE 20 MEQ: 782 TABLET, EFFERVESCENT ORAL at 08:20

## 2025-02-24 RX ADMIN — IPRATROPIUM BROMIDE AND ALBUTEROL SULFATE 1 DOSE: .5; 2.5 SOLUTION RESPIRATORY (INHALATION) at 07:27

## 2025-02-24 RX ADMIN — SODIUM CHLORIDE 3000 MG: 900 INJECTION INTRAVENOUS at 05:59

## 2025-02-24 RX ADMIN — ACETAMINOPHEN 650 MG: 325 TABLET ORAL at 12:39

## 2025-02-24 RX ADMIN — ENOXAPARIN SODIUM 40 MG: 100 INJECTION SUBCUTANEOUS at 08:19

## 2025-02-24 RX ADMIN — IPRATROPIUM BROMIDE AND ALBUTEROL SULFATE 1 DOSE: .5; 2.5 SOLUTION RESPIRATORY (INHALATION) at 00:18

## 2025-02-24 RX ADMIN — GLYCOPYRROLATE 0.2 MG: 0.2 INJECTION INTRAMUSCULAR; INTRAVENOUS at 15:28

## 2025-02-24 RX ADMIN — FENTANYL CITRATE 50 MCG: 50 INJECTION INTRAMUSCULAR; INTRAVENOUS at 10:23

## 2025-02-24 RX ADMIN — INSULIN LISPRO 8 UNITS: 100 INJECTION, SOLUTION INTRAVENOUS; SUBCUTANEOUS at 12:39

## 2025-02-24 RX ADMIN — MORPHINE SULFATE 2 MG: 2 INJECTION, SOLUTION INTRAMUSCULAR; INTRAVENOUS at 23:10

## 2025-02-24 RX ADMIN — INSULIN LISPRO 4 UNITS: 100 INJECTION, SOLUTION INTRAVENOUS; SUBCUTANEOUS at 05:57

## 2025-02-24 ASSESSMENT — PULMONARY FUNCTION TESTS
PIF_VALUE: 20
PIF_VALUE: 24
PIF_VALUE: 16
PIF_VALUE: 16
PIF_VALUE: 17
PIF_VALUE: 24

## 2025-02-24 NOTE — ANESTHESIA POSTPROCEDURE EVALUATION
Department of Anesthesiology  Postprocedure Note    Patient: Anthony Jacobson  MRN: [id number]  YOB: 1950  Date of evaluation: 2/24/2025    Procedure Summary       Date: 02/19/25 Room / Location: [facility]    Anesthesia Start: 0750 Anesthesia Stop: 1021    Procedure: IR ANGIOGRAM CAROTID CEREBRAL BILATERAL Diagnosis: (mca thrombectomy)    Scheduled Providers:  Responsible Provider: Kiya Berman MD    Anesthesia Type: general, MAC ASA Status: 3 - Emergent            Anesthesia Type: No value filed.    Topher Phase I: Topher Score: 4    Topher Phase II:      Anesthesia Post Evaluation    Patient location during evaluation: bedside  Patient participation: complete - patient participated  Level of consciousness: awake and awake and alert  Pain score: 3  Airway patency: patent  Nausea & Vomiting: no nausea and no vomiting  Cardiovascular status: blood pressure returned to baseline and hemodynamically stable  Respiratory status: acceptable  Hydration status: euvolemic  Pain management: adequate        No notable events documented.

## 2025-02-24 NOTE — PLAN OF CARE
Problem: Respiratory - Adult  Goal: Achieves optimal ventilation and oxygenation  2/24/2025 0735 by Nemo Reddy RCP  Outcome: Progressing  2/24/2025 0531 by Mariposa rFausto RN  Outcome: Progressing

## 2025-02-24 NOTE — CARE COORDINATION
Dx: Fall, initial encounter [W19.XXXA]  Internal carotid artery occlusion, left [I65.22]  Cerebrovascular accident (CVA), unspecified mechanism (HCC) [I63.9]    Admit date: 2/19/2025  GMLOS : 7.3  LOS : 5    BS RISK OF UNPLANNED READMISSION 2.0             15.2 Total Score        ______________________________________      Patients goal/ Transitional Planning : Patient remains intubated, off sedation. Phone call from Yessy with palliative care after she spoke with family. They would like referral to [facility]. Referral sent. Possible plan for terminal extubation-timing to be determined.    1133 Phone call from Sharda with [facility]. JESS Machado will be here to meet with the family at 2:30pm    1540 Notified by AZAEL Mullins patient just terminally extubated . JESS Machado from [facility] has been notified and they will see patient and family tomorrow.      PT/OT recommendations : n/a        Barriers to discharge : Intubated, not following commands.

## 2025-02-24 NOTE — PLAN OF CARE
Problem: Safety - Adult  Goal: Free from fall injury  2/24/2025 0531 by Mariposa Frausto RN  Outcome: Progressing  2/23/2025 1652 by Susanna Chapa RN  Outcome: Progressing     Problem: Discharge Planning  Goal: Discharge to home or other facility with appropriate resources  2/24/2025 0531 by Mariposa Frausto RN  Outcome: Progressing  2/23/2025 1652 by Susanna Chapa RN  Outcome: Progressing     Problem: Safety - Medical Restraint  Goal: Remains free of injury from restraints (Restraint for Interference with Medical Device)  Description: INTERVENTIONS:  1. Determine that other, less restrictive measures have been tried or would not be effective before applying the restraint  2. Evaluate the patient's condition at the time of restraint application  3. Inform patient/family regarding the reason for restraint  4. Q2H: Monitor safety, psychosocial status, comfort, nutrition and hydration  2/24/2025 0531 by Mariposa Frausto RN  Outcome: Progressing  Flowsheets (Taken 2/23/2025 2000)  Remains free of injury from restraints (restraint for interference with medical device):   Determine that other, less restrictive measures have been tried or would not be effective before applying the restraint   Evaluate the patient's condition at the time of restraint application   Inform patient/family regarding the reason for restraint   Every 2 hours: Monitor safety, psychosocial status, comfort, nutrition and hydration  2/23/2025 1652 by Susanna Chapa RN  Outcome: Progressing     Problem: Pain  Goal: Verbalizes/displays adequate comfort level or baseline comfort level  2/24/2025 0531 by Mariposa Frausto RN  Outcome: Progressing  2/23/2025 1652 by Susanna Chapa RN  Outcome: Progressing     Problem: Confusion  Goal: Confusion, delirium, dementia, or psychosis is improved or at baseline  Description: INTERVENTIONS:  1. Assess for possible contributors to thought disturbance, including medications, impaired vision or

## 2025-02-24 NOTE — PLAN OF CARE
Problem: Safety - Adult  Goal: Free from fall injury  2/24/2025 1819 by Susanna Chapa RN  Outcome: Progressing  2/24/2025 0531 by Mariposa Frausto RN  Outcome: Progressing     Problem: Discharge Planning  Goal: Discharge to home or other facility with appropriate resources  2/24/2025 1819 by Susanna Chapa RN  Outcome: Progressing  2/24/2025 0531 by Mariposa Frausto RN  Outcome: Progressing     Problem: Safety - Medical Restraint  Goal: Remains free of injury from restraints (Restraint for Interference with Medical Device)  Description: INTERVENTIONS:  1. Determine that other, less restrictive measures have been tried or would not be effective before applying the restraint  2. Evaluate the patient's condition at the time of restraint application  3. Inform patient/family regarding the reason for restraint  4. Q2H: Monitor safety, psychosocial status, comfort, nutrition and hydration  2/24/2025 1819 by Susanna Chapa RN  Outcome: Progressing  2/24/2025 0531 by Mariposa Frausto RN  Outcome: Progressing  Flowsheets (Taken 2/23/2025 2000)  Remains free of injury from restraints (restraint for interference with medical device):   Determine that other, less restrictive measures have been tried or would not be effective before applying the restraint   Evaluate the patient's condition at the time of restraint application   Inform patient/family regarding the reason for restraint   Every 2 hours: Monitor safety, psychosocial status, comfort, nutrition and hydration     Problem: Pain  Goal: Verbalizes/displays adequate comfort level or baseline comfort level  2/24/2025 1819 by Susanna Chapa RN  Outcome: Progressing  2/24/2025 0531 by Mariposa Frausto RN  Outcome: Progressing     Problem: Confusion  Goal: Confusion, delirium, dementia, or psychosis is improved or at baseline  Description: INTERVENTIONS:  1. Assess for possible contributors to thought disturbance, including medications, impaired vision or

## 2025-02-25 VITALS
TEMPERATURE: 100.6 F | BODY MASS INDEX: 30.06 KG/M2 | RESPIRATION RATE: 33 BRPM | HEIGHT: 70 IN | HEART RATE: 67 BPM | SYSTOLIC BLOOD PRESSURE: 147 MMHG | WEIGHT: 210 LBS | DIASTOLIC BLOOD PRESSURE: 67 MMHG | OXYGEN SATURATION: 89 %

## 2025-02-25 LAB
ECHO BSA: 2.17 M2
MICROORGANISM SPEC CULT: NORMAL
MICROORGANISM SPEC CULT: NORMAL
SERVICE CMNT-IMP: NORMAL
SERVICE CMNT-IMP: NORMAL
SPECIMEN DESCRIPTION: NORMAL
SPECIMEN DESCRIPTION: NORMAL

## 2025-02-25 PROCEDURE — 2500000003 HC RX 250 WO HCPCS: Performed by: STUDENT IN AN ORGANIZED HEALTH CARE EDUCATION/TRAINING PROGRAM

## 2025-02-25 PROCEDURE — 99231 SBSQ HOSP IP/OBS SF/LOW 25: CPT | Performed by: NURSE PRACTITIONER

## 2025-02-25 PROCEDURE — 93971 EXTREMITY STUDY: CPT | Performed by: SURGERY

## 2025-02-25 PROCEDURE — 6360000002 HC RX W HCPCS: Performed by: NURSE PRACTITIONER

## 2025-02-25 PROCEDURE — 6370000000 HC RX 637 (ALT 250 FOR IP): Performed by: PSYCHIATRY & NEUROLOGY

## 2025-02-25 PROCEDURE — 2500000003 HC RX 250 WO HCPCS: Performed by: PSYCHIATRY & NEUROLOGY

## 2025-02-25 RX ADMIN — SODIUM CHLORIDE, PRESERVATIVE FREE 5 ML: 5 INJECTION INTRAVENOUS at 09:49

## 2025-02-25 RX ADMIN — ACETAMINOPHEN 650 MG: 650 SUPPOSITORY RECTAL at 10:40

## 2025-02-25 RX ADMIN — SODIUM CHLORIDE, PRESERVATIVE FREE 5 ML: 5 INJECTION INTRAVENOUS at 09:50

## 2025-02-25 RX ADMIN — MORPHINE SULFATE 2 MG: 2 INJECTION, SOLUTION INTRAMUSCULAR; INTRAVENOUS at 03:22

## 2025-02-25 RX ADMIN — MORPHINE SULFATE 2 MG: 2 INJECTION, SOLUTION INTRAMUSCULAR; INTRAVENOUS at 10:41

## 2025-02-25 RX ADMIN — Medication 1 MG: at 01:45

## 2025-02-25 RX ADMIN — Medication 1 MG: at 07:59

## 2025-02-25 RX ADMIN — MORPHINE SULFATE 2 MG: 2 INJECTION, SOLUTION INTRAMUSCULAR; INTRAVENOUS at 09:27

## 2025-02-25 NOTE — DISCHARGE SUMMARY
control, iterative reconstruction, and/or weight based adjustment of the mA/kV was utilized to reduce the radiation dose to as low as reasonably achievable. COMPARISON: MR brain 02/19/2025 HISTORY: ORDERING SYSTEM PROVIDED HISTORY: eval of cerebral edema FINDINGS: BRAIN/VENTRICLES: Large evolving infarct within the left MCA and ANNABEL territories with scattered petechial hemorrhage.  There is localized sulcal and fissural effacement.  Slight increase in 4 mm rightward midline shift, previously 2 mm.  Moderate compression of the left lateral ventricle.  No significant change in appearance of the remaining ventricular system.  The basal cisterns are patent.  Evolving parenchymal hemorrhage within the right occipital pole. ORBITS: The visualized portion of the orbits demonstrate no acute abnormality. SINUSES: The visualized paranasal sinuses and mastoid air cells demonstrate no acute abnormality. SOFT TISSUES/SKULL:  No acute abnormality of the visualized skull or soft tissues.     1. Large evolving infarct within the left MCA and ANNABEL territories with scattered petechial hemorrhage. 2. Slight increase in 4 mm rightward midline shift, previously 2 mm. 3. Evolving parenchymal hemorrhage within the right occipital pole.     CT CHEST ABDOMEN PELVIS W CONTRAST Additional Contrast? None    Result Date: 2/20/2025  EXAMINATION: CT OF THE CHEST, ABDOMEN, AND PELVIS WITH CONTRAST 2/19/2025 4:14 am TECHNIQUE: CT of the chest, abdomen and pelvis was performed with the administration of intravenous contrast. Multiplanar reformatted images are provided for review. Automated exposure control, iterative reconstruction, and/or weight based adjustment of the mA/kV was utilized to reduce the radiation dose to as low as reasonably achievable. COMPARISON: CT thoracic and lumbar spine exams today. HISTORY: ORDERING SYSTEM PROVIDED HISTORY: trauma TECHNOLOGIST PROVIDED HISTORY: trauma Reason for Exam: AMS; FALL CHEST: Mediastinum: Status post

## 2025-02-25 NOTE — PLAN OF CARE
Problem: Safety - Adult  Goal: Free from fall injury  Outcome: Adequate for Discharge     Problem: Discharge Planning  Goal: Discharge to home or other facility with appropriate resources  Outcome: Adequate for Discharge     Problem: Pain  Goal: Verbalizes/displays adequate comfort level or baseline comfort level  Outcome: Adequate for Discharge     Problem: Confusion  Goal: Confusion, delirium, dementia, or psychosis is improved or at baseline  Description: INTERVENTIONS:  1. Assess for possible contributors to thought disturbance, including medications, impaired vision or hearing, underlying metabolic abnormalities, dehydration, psychiatric diagnoses, and notify attending LIP  2. [place] high risk fall precautions, as indicated  3. Provide frequent short contacts to provide reality reorientation, refocusing and direction  4. Decrease environmental stimuli, including noise as appropriate  5. Monitor and intervene to maintain adequate nutrition, hydration, elimination, sleep and activity  6. If unable to ensure safety without constant attention obtain sitter and review sitter guidelines with assigned personnel  7. Initiate Psychosocial CNS and Spiritual Care consult, as indicated  Outcome: Adequate for Discharge     Problem: Skin/Tissue Integrity  Goal: Skin integrity remains intact  Description: 1.  Monitor for areas of redness and/or skin breakdown  2.  Assess vascular access sites hourly  3.  Every 4-6 hours minimum:  Change oxygen saturation probe site  4.  Every 4-6 hours:  If on nasal continuous positive airway pressure, respiratory therapy assess nares and determine need for appliance change or resting period  Outcome: Adequate for Discharge     Problem: ABCDS Injury Assessment  Goal: Absence of physical injury  Outcome: Adequate for Discharge     Problem: Respiratory - Adult  Goal: Achieves optimal ventilation and oxygenation  Outcome: Adequate for Discharge     Problem: Nutrition Deficit:  Goal:

## 2025-02-25 NOTE — CARE COORDINATION
Transitional planning    Spoke to Paulina with [facility]. CM notes say someone is supposed to meet with family today. She will check and call CM back.     0959 per Paulina with Yolande with [facility] is with family now.     1030 spoke  with Yolande. Patient is being picked up at 1130 per Nura to go to inpt hospice at [facility]. Cm spoke to family members at bedside and they confirm this plan.      1045 [facility] transport packet completed. Copy of DNR CC order included.

## 2025-02-28 DIAGNOSIS — E11.9 TYPE 2 DIABETES MELLITUS WITHOUT COMPLICATION, WITHOUT LONG-TERM CURRENT USE OF INSULIN (HCC): ICD-10-CM

## 2025-02-28 NOTE — TELEPHONE ENCOUNTER
Anthony Jacobson Jr. is calling to request a refill on the following medication(s):    Medication Request:  Requested Prescriptions     Pending Prescriptions Disp Refills    metFORMIN (GLUCOPHAGE) 1000 MG tablet [Pharmacy Med Name: METFORMIN HCL 1,000 MG TABLET] 180 tablet 1     Sig: TAKE 1 TABLET BY MOUTH TWICE A DAY WITH A MEAL       Last Visit Date (If Applicable):  2/7/2025    Next Visit Date:    5/12/2025

## 2025-02-28 NOTE — PROGRESS NOTES
Physical Therapy Cancel Note      DATE: 2025    NAME: Anthony Jacobson  MRN: [id number]   : 1950      Patient not seen this date for Physical Therapy due to:    Strict Bedrest:      Electronically signed by Daniel Akbar PT on 2025 at 8:13 AM      
    Endovascular Neurosurgery progress      Reason for evaluation: L M1 Occlusion s/p MT w/ large L hemispheric stroke    SUBJECTIVE:     NAEO from EVN perspective. Exam stable with no significant improvements. No new focal neuro deficits. Family discussions ongoing.    History of Chief Complaint:    The patient is a 74 y.o. male who presents after a fall. Was being worked up by trauma team initially but stroke alert called as patient remained unresponsive per notes. LKW listed as unclear. Per son present at bedside, LKW ~ 9PM prior to going to bed but heard the patient fall in the bathroom around ~3-4 AM.  Initial NIH 17.  SBP 170s.  Patient was alert, mute.  Per bedside RN was initially following commands on arrival to ED.  Not following any commands on my assessment.  Initially opening eyes to voice and lethargic, progressively became more somnolent throughout exam.  Had an episode of increased tone in right upper extremity as well as bilateral lower extremities along with an episode of urinary incontinence.  Right upper extremity tone normalized within 10 minutes.  Moving left upper extremity spontaneously however only moving right upper extremity to pain.  Mute with no attempts at speech.  No gaze deviation noted, patient in a c-collar.  Blinking to threat.  History of CABG s/p stents, on aspirin and Brilinta percent.  Not sure what if he took his medications but he is usually compliant with all his medications.  Denies prior history of seizures, stroke.    Patient did undergo MT with 1 pass TICI 3 on 2/19/2025      Allergies  has No Known Allergies.  Medications  Prior to Admission medications    Not on File    Scheduled Meds:   lisinopril  20 mg Per NG tube Daily    lansoprazole  30 mg Per NG tube QAM AC    potassium bicarb-citric acid  20 mEq Per NG tube Once    insulin lispro  0-16 Units SubCUTAneous Q4H    insulin glargine  12 Units SubCUTAneous Nightly    aspirin  81 mg Per NG tube Daily    atorvastatin  
    Endovascular Neurosurgery progress      Reason for evaluation: L M1 Occlusion s/p MT w/ large L hemispheric stroke    SUBJECTIVE:     NAEO from EVN perspective. Minimal improvements in exam. Opens eye to painful stimuli, minimal tracking when eyes open, R hemianopsia. Remain with left gaze deviation and inability to follow commands today.     History of Chief Complaint:    The patient is a 74 y.o. male who presents after a fall. Was being worked up by trauma team initially but stroke alert called as patient remained unresponsive per notes. LKW listed as unclear. Per son present at bedside, LKW ~ 9PM prior to going to bed but heard the patient fall in the bathroom around ~3-4 AM.  Initial NIH 17.  SBP 170s.  Patient was alert, mute.  Per bedside RN was initially following commands on arrival to ED.  Not following any commands on my assessment.  Initially opening eyes to voice and lethargic, progressively became more somnolent throughout exam.  Had an episode of increased tone in right upper extremity as well as bilateral lower extremities along with an episode of urinary incontinence.  Right upper extremity tone normalized within 10 minutes.  Moving left upper extremity spontaneously however only moving right upper extremity to pain.  Mute with no attempts at speech.  No gaze deviation noted, patient in a c-collar.  Blinking to threat.  History of CABG s/p stents, on aspirin and Brilinta percent.  Not sure what if he took his medications but he is usually compliant with all his medications.  Denies prior history of seizures, stroke.    Patient did undergo MT with 1 pass TICI 3 on 2/19/2025      Allergies  has No Known Allergies.  Medications  Prior to Admission medications    Not on File    Scheduled Meds:   insulin lispro  0-16 Units SubCUTAneous Q4H    insulin glargine  12 Units SubCUTAneous Nightly    aspirin  81 mg Per NG tube Daily    lisinopril  10 mg Per NG tube Daily    atorvastatin  40 mg Per NG tube 
    Endovascular Neurosurgery progress      Reason for evaluation: L M1 Occlusion s/p MT w/ large L hemispheric stroke    SUBJECTIVE:     NAEO from EVN perspective. Minimal improvements in exam. Remains intubated. Goals of care conversations ongoing.       History of Chief Complaint:    The patient is a 74 y.o. male who presents after a fall. Was being worked up by trauma team initially but stroke alert called as patient remained unresponsive per notes. LKW listed as unclear. Per son present at bedside, LKW ~ 9PM prior to going to bed but heard the patient fall in the bathroom around ~3-4 AM.  Initial NIH 17.  SBP 170s.  Patient was alert, mute.  Per bedside RN was initially following commands on arrival to ED.  Not following any commands on my assessment.  Initially opening eyes to voice and lethargic, progressively became more somnolent throughout exam.  Had an episode of increased tone in right upper extremity as well as bilateral lower extremities along with an episode of urinary incontinence.  Right upper extremity tone normalized within 10 minutes.  Moving left upper extremity spontaneously however only moving right upper extremity to pain.  Mute with no attempts at speech.  No gaze deviation noted, patient in a c-collar.  Blinking to threat.  History of CABG s/p stents, on aspirin and Brilinta percent.  Not sure what if he took his medications but he is usually compliant with all his medications.  Denies prior history of seizures, stroke.       LKW: ~9 PM per son  NIHSS: 17  Modified [place] Scale: 2  SBP: 170s  Glucose: wnl  CT head without contrast: Completed  TNK: Not a candidate  Endovascular: Taken for emergent thrombectomy      Allergies  has No Known Allergies.  Medications  Prior to Admission medications    Not on File    Scheduled Meds:   insulin lispro  0-8 Units SubCUTAneous Q6H    magnesium sulfate  2,000 mg IntraVENous Once    sodium chloride flush  5-40 mL IntraVENous 2 times per day    sodium 
    Endovascular Neurosurgery progress      Reason for evaluation: L M1 Occlusion s/p MT w/ large L hemispheric stroke    SUBJECTIVE:     NAEO from EVN perspective. Minimal improvements in exam. Remains intubated. Goals of care conversations ongoing. Patient today examined when sedation was off. He is expectedly lethargic, but able to open eyes to repeated stimuli. There is left gaze deviation and right hemianopsia. Right upper is plegic and some fleckering of toes noted in the right lower. Does inconsistently follow simple commands, such as wiggling toes. No movement in th right upper.       History of Chief Complaint:    The patient is a 74 y.o. male who presents after a fall. Was being worked up by trauma team initially but stroke alert called as patient remained unresponsive per notes. LKW listed as unclear. Per son present at bedside, LKW ~ 9PM prior to going to bed but heard the patient fall in the bathroom around ~3-4 AM.  Initial NIH 17.  SBP 170s.  Patient was alert, mute.  Per bedside RN was initially following commands on arrival to ED.  Not following any commands on my assessment.  Initially opening eyes to voice and lethargic, progressively became more somnolent throughout exam.  Had an episode of increased tone in right upper extremity as well as bilateral lower extremities along with an episode of urinary incontinence.  Right upper extremity tone normalized within 10 minutes.  Moving left upper extremity spontaneously however only moving right upper extremity to pain.  Mute with no attempts at speech.  No gaze deviation noted, patient in a c-collar.  Blinking to threat.  History of CABG s/p stents, on aspirin and Brilinta percent.  Not sure what if he took his medications but he is usually compliant with all his medications.  Denies prior history of seizures, stroke.       LKW: ~9 PM per son  NIHSS: 17  Modified [place] Scale: 2  SBP: 170s  Glucose: wnl  CT head without contrast: Completed  TNK: Not a 
   02/20/25 1229   Care Plan - Respiratory Goals   Achieves optimal ventilation and oxygenation Assess for changes in respiratory status;Assess for changes in mentation and behavior;Position to facilitate oxygenation and minimize respiratory effort;Assess and instruct to report shortness of breath or any respiratory difficulty;Respiratory therapy support as indicated;Assess the need for suctioning and aspirate as needed;Encourage broncho-pulmonary hygiene including cough, deep breathe, incentive spirometry;Oxygen supplementation based on oxygen saturation or arterial blood gases       
   02/21/25 1146   Care Plan - Respiratory Goals   Achieves optimal ventilation and oxygenation Assess for changes in mentation and behavior;Assess for changes in respiratory status;Assess and instruct to report shortness of breath or any respiratory difficulty;Respiratory therapy support as indicated;Assess the need for suctioning and aspirate as needed;Encourage broncho-pulmonary hygiene including cough, deep breathe, incentive spirometry;Oxygen supplementation based on oxygen saturation or arterial blood gases;Position to facilitate oxygenation and minimize respiratory effort       
   02/23/25 1600   Vent Information   Vent Mode (S)  CPAP/PS   Ventilator Settings   PEEP/CPAP (cmH2O) (S)  8   FiO2  (S)  40 %   Pressure Support (cm H2O) (S)  8 cm H2O     Pt placed on SBT. Pt tolerating well at this time. Will continue to monitor.   
   Daily Progress Note  Neuro Critical Care    Patient Name: Anthony Jacobson  Patient : 1950  Room/Bed: 0125/0125-01  Code Status: DNR-CCA  Allergies: No Known Allergies    CHIEF COMPLAINT:      Intubated      INTERVAL HISTORY    Initial Presentation (Admitted 25):  The patient is a 74 y.o. male with a history of hypertension, hyperlipidemia, CAD s/p CABG x2, chronic diastolic congestive heart failure, type 2 diabetes mellitus who presented to the emergency department following being found minimally responsive at home after an unwitnessed fall. Per patient's son last known well was approximately 9 PM yesterday evening and was heard to have likely fell around 3 to 4 AM.  On arrival to the emergency department patient was noted to be lethargic, opening eyes to repeated stimulation, with right upper and lower extremity hemiplegia and global aphasia, initial NIH 17.  Initial CT head shows a small right cerebellar intraparenchymal hemorrhage with mild surrounding edema and possible left MCA territory hypodensity.  CTA head and neck shows a proximal left M1 occlusion and questionable P4 occlusion/stenosis.  Patient was evaluated by neuroendovascular and was ultimately taken for emergent mechanical thrombectomy of the left M1 one pass TICI 3.  Postprocedure decision was made by anesthesia to leave the patient intubated given low GCS prior to intervention and on arrival to the neuro ICU patient is intubated, sedated with propofol, and recently received chemical paralysis prior to transport therefore neurological examination is significantly limited.      Hospital Course:  : MRI brain without contrast yesterday evening showed large left MCA and ANNABEL territory infarction with petechial hemorrhage and mild mass effect on the left lateral ventricle with minimal midline shift, stable right occipital lobe hemorrhage with mild surrounding edema.  Repeat CT head without contrast this morning redemonstrated the 
   Daily Progress Note  Neuro Critical Care    Patient Name: Anthony Jacobson  Patient : 1950  Room/Bed: 0125/0125-01  Code Status: DNR-CCA  Allergies: No Known Allergies    CHIEF COMPLAINT:      Intubated      INTERVAL HISTORY    Initial Presentation (Admitted 25):  The patient is a 74 y.o. male with a history of hypertension, hyperlipidemia, CAD s/p CABG x2, chronic diastolic congestive heart failure, type 2 diabetes mellitus who presented to the emergency department following being found minimally responsive at home after an unwitnessed fall. Per patient's son last known well was approximately 9 PM yesterday evening and was heard to have likely fell around 3 to 4 AM.  On arrival to the emergency department patient was noted to be lethargic, opening eyes to repeated stimulation, with right upper and lower extremity hemiplegia and global aphasia, initial NIH 17.  Initial CT head shows a small right cerebellar intraparenchymal hemorrhage with mild surrounding edema and possible left MCA territory hypodensity.  CTA head and neck shows a proximal left M1 occlusion and questionable P4 occlusion/stenosis.  Patient was evaluated by neuroendovascular and was ultimately taken for emergent mechanical thrombectomy of the left M1 one pass TICI 3.  Postprocedure decision was made by anesthesia to leave the patient intubated given low GCS prior to intervention and on arrival to the neuro ICU patient is intubated, sedated with propofol, and recently received chemical paralysis prior to transport therefore neurological examination is significantly limited.      Hospital Course:  : MRI brain without contrast yesterday evening showed large left MCA and ANNABEL territory infarction with petechial hemorrhage and mild mass effect on the left lateral ventricle with minimal midline shift, stable right occipital lobe hemorrhage with mild surrounding edema.  Repeat CT head without contrast this morning redemonstrated the 
   Daily Progress Note  Neuro Critical Care    Patient Name: Anthony Jacobson  Patient : 1950  Room/Bed: 0125/0125-01  Code Status: DNR-CCA  Allergies: No Known Allergies    CHIEF COMPLAINT:     Unable to state; intubated      INTERVAL HISTORY    Initial Presentation (Admitted 25):  The patient is a 74 y.o. male with a history of hypertension, hyperlipidemia, CAD s/p CABG x2, chronic diastolic congestive heart failure, type 2 diabetes mellitus who presented to the emergency department following being found minimally responsive at home after an unwitnessed fall. Per patient's son last known well was approximately 9 PM yesterday evening and was heard to have likely fell around 3 to 4 AM.  On arrival to the emergency department patient was noted to be lethargic, opening eyes to repeated stimulation, with right upper and lower extremity hemiplegia and global aphasia, initial NIH 17.  Initial CT head shows a small right cerebellar intraparenchymal hemorrhage with mild surrounding edema and possible left MCA territory hypodensity.  CTA head and neck shows a proximal left M1 occlusion and questionable P4 occlusion/stenosis.  Patient was evaluated by neuroendovascular and was ultimately taken for emergent mechanical thrombectomy of the left M1 one pass TICI 3.  Postprocedure decision was made by anesthesia to leave the patient intubated given low GCS prior to intervention and on arrival to the neuro ICU patient is intubated, sedated with propofol, and recently received chemical paralysis prior to transport therefore neurological examination is significantly limited.      Hospital Course:  : MRI brain without contrast yesterday evening showed large left MCA and ANNABEL territory infarction with petechial hemorrhage and mild mass effect on the left lateral ventricle with minimal midline shift, stable right occipital lobe hemorrhage with mild surrounding edema.  Repeat CT head without contrast this morning 
   Daily Progress Note  Neuro Critical Care    Patient Name: Anthony Jacobson  Patient : 1950  Room/Bed: 0125/0125-01  Code Status: FULL  Allergies: No Known Allergies    CHIEF COMPLAINT:      Unable to state; acute stroke      INTERVAL HISTORY    Initial Presentation (Admitted 25):  The patient is a 74 y.o. male with a history of hypertension, hyperlipidemia, CAD s/p CABG x2, chronic diastolic congestive heart failure, type 2 diabetes mellitus who presented to the emergency department following being found minimally responsive at home after an unwitnessed fall. Per patient's son last known well was approximately 9 PM yesterday evening and was heard to have likely fell around 3 to 4 AM.  On arrival to the emergency department patient was noted to be lethargic, opening eyes to repeated stimulation, with right upper and lower extremity hemiplegia and global aphasia, initial NIH 17.  Initial CT head shows a small right cerebellar intraparenchymal hemorrhage with mild surrounding edema and possible left MCA territory hypodensity.  CTA head and neck shows a proximal left M1 occlusion and questionable P4 occlusion/stenosis.  Patient was evaluated by neuroendovascular and was ultimately taken for emergent mechanical thrombectomy of the left M1 one pass TICI 3.  Postprocedure decision was made by anesthesia to leave the patient intubated given low GCS prior to intervention and on arrival to the neuro ICU patient is intubated, sedated with propofol, and recently received chemical paralysis prior to transport therefore neurological examination is significantly limited.     Interval Events:   MRI brain without contrast yesterday evening showed large left MCA and ANNABEL territory infarction with petechial hemorrhage and mild mass effect on the left lateral ventricle with minimal midline shift, stable right occipital lobe hemorrhage with mild surrounding edema.  Repeat CT head without contrast this morning 
  [facility]  Occupational Therapy Not Seen Note    DATE: 2025    NAME: Anthony Jacobson  MRN: [id number]   : 1950      Patient not seen this date for Occupational Therapy due to:    Other: SBR orders and Int at this time. Will continue to follow.    Next Scheduled Treatment:       Electronically signed by JORGE Alcocer on 2025 at 8:23 AM    
  [facility]     [place]gency/Trauma Note    PATIENT NAME: Mart Frias    Shift date: 2/18/25  Shift day: Tuesday   Shift # 3    Room # 08/08   Name: Mart Frias            Age: 74 y.o.  Gender: male          Church: [employer]   Place of [employer]:     Trauma/Incident type: Adult Trauma Priority  Admit Date & Time: 2/19/2025  4:02 AM  TRAUMA NAME: ARIEL    ADVANCE DIRECTIVES IN CHART?  No    NAME OF DECISION MAKER:     RELATIONSHIP OF DECISION MAKER TO PATIENT:     PATIENT/EVENT DESCRIPTION:  Mart Frias is a 74 y.o. male who arrived via  EMS as a Trauma Priority.  Patient FELL in the bathroom. Patient presents with laceration to right elbow.  Concerns for possible stroke. Pt to be admitted to 08/08.         SPIRITUAL ASSESSMENT-INTERVENTION-OUTCOME:   was ministry of presence.  obtained and verified patient's ID.  [place] information given to [place] and [place] [facility] currently has [place].   met patient's son Giuseppe in ED triage. Son is very good medical historian.  escorted son to ED #8. Son received medical update.   prayed with son and patient in ED 8.  PATIENT BELONGINGS:  With patient    ANY BELONGINGS OF SIGNIFICANT VALUE NOTED:  None Noted    REGISTRATION STAFF NOTIFIED?  Yes      WHAT IS YOUR SPIRITUAL CARE PLAN FOR THIS PATIENT?:   Chaplains are available 24/7 via [employer].    Electronically signed by Chaplain Franki, on 2/19/2025 at 5:09 AM.  [facility]  [phone]   
  PALLIATIVE CARE PROGRESS NOTE     NAME:  Anthony Jacobson  MEDICAL RECORD NUMBER:  [id number]  AGE: 74 y.o.   GENDER: male  : 1950  TODAY'S DATE:  2025  Room: [facility]0125-01    Reason For Consult:  Goals of care evaluation  Distress management  Symptom Management  Guidance and support  Facilitate communications  Assistance in coordinating care  Recommendations for the above    Plan      Palliative Interaction:    Patient's son Giuseppe and sister Kayy at the bedside. Giuseppe states that they are considering transitioning patient to hospice care.     I discussed the process of palliative extubation with code status change to DNR-CC. Family had multiple questions regarding hospice care. Patient's wife was in hospice 7 months ago with [facility]. They verbalize being interested in meeting with them to discuss plan of care. They are agreeable for hospice consult.     They do not feel that patient has shown improvements over the weekend. I encouraged them to discuss with neurocritical care this morning during rounds while the hospice meeting is being set up. They are agreeable. Will follow up after meeting is completed.     Palliative to follow. Discussed with NCC.     I was contacted by patient's nurse that family is ready to proceed with extubation. I met with patient's family - multiple siblings and son Giuseppe at the bedside. They had a hospice meeting at 230pm today.     Giuseppe confirmed that he would like to proceed with extubation and DNRCC. Discussed process of extubation and medications that would be ordered. He is agreeable with plan of care.     DNR form completed. Orders placed for comfort medications.     IMPRESSION/ PLAN  Symptom management/pain control    We feel the patient's symptoms are being controlled. Their current regimen has been reviewed by myself and discussed with the staff. We recommend adjusting their medications as follows:  Ativan 1mg q4 prn   Morphine 2-4mg q15 min prn 
  PALLIATIVE CARE PROGRESS NOTE     NAME:  Anthony Jacobson  MEDICAL RECORD NUMBER:  [id number]  AGE: 74 y.o.   GENDER: male  : 1950  TODAY'S DATE:  2025  Room: [facility]0125-01    Reason For Consult:  Goals of care evaluation  Distress management  Symptom Management  Guidance and support  Facilitate communications  Assistance in coordinating care  Recommendations for the above    Plan      Palliative Interaction:    Patient evaluated this morning. Raquel RN from hospice present in the room.     Planning for potential transfer to inpatient hospice in [place]. Family denies any current needs or questions.     IMPRESSION/ PLAN  Symptom management/pain control    We feel the patient's symptoms are being controlled. Their current regimen has been reviewed by myself and discussed with the staff. We recommend adjusting their medications as follows:      Goals of care evaluation  The patient goals of care are provide comfort care/support/palliation/relieve suffering and support for family/caregiver  Long discussion to ensure the patient's and family's understanding of goals of care, and theconcept of palliative care.    Code Status:  DNRCC    Other Recommendations -       History of Present Illness     HISTORY OF PRESENT ILLNESS:   The patient is a 74 y.o. male with history of DM 2, CAD s/p CABG, chronic CHF, HTN, HLD.  Patient presented to [facility] emergency department on 2025 after being found minimally responsive at home.  Patient had unwitnessed fall.  Last known well time 9 PM on 2025.  Initial exam in ED patient was lethargic, right sided hemiaplasia, global aphasia.  CT head showed small right cerebellar IPH and edema and abnormality in left MCA.  CTA head and neck obtained.  Patient is s/p emergent mechanical thrombectomy of left M1 with neuroendovascular.  MRI brain obtained after procedure showed large infarct.  Patient was admitted to neuro ICU and remains intubated.  Palliative care 
  [facility] Progress [facility]  HERBERT SANDOVAL   2/19/2025    Anthony Jacobson  1950  [id number]      Time spent with Patient: 0 minutes  Time spent with Family:     This writer was unable to complete screen or therapy services with patient at bedside at this time due to the following:  Deferred pending improvement to mental status   
  Trauma Tertiary Survey    Admit Date: 2/19/2025  Hospital day 0      Subjective:     FFSH, -AC  L MCA Stroke - s/p mechanical thrombectomy    Limited tertiary exam performed due to patient being intubated    Objective:   PHYSICAL EXAM:   Physical Exam  Constitutional:       Interventions: He is sedated, intubated and restrained.   HENT:      Head: Normocephalic and atraumatic.   Cardiovascular:      Rate and Rhythm: Normal rate and regular rhythm.      Pulses: Normal pulses.   Pulmonary:      Effort: He is intubated.   Abdominal:      General: There is no distension.      Palpations: Abdomen is soft.   Skin:     General: Skin is warm and dry.   Neurological:      Comments: Intubated and sedated           Spine:     Spine Tenderness ROM   Cervical Unable to ascertain Not Indicated   Thoracic Unable to ascertain Not Indicated   Lumbar Unable to ascertain Not Indicated     Musculoskeletal    Joint Tenderness Swelling ROM   Right shoulder Unable to ascertain absent Limited exam   Left shoulder Unable to ascertain absent Limited exam   Right elbow Unable to ascertain absent Limited exam   Left elbow Unable to ascertain absent Limited exam   Right wrist Unable to ascertain absent Limited exam   Left wrist Unable to ascertain absent Limited exam   Right hand grasp Unable to ascertain absent Limited exam   Left hand grasp Unable to ascertain absent Limited exam   Right hip Unable to ascertain absent Limited exam   Left hip Unable to ascertain absent Limited exam   Right knee Unable to ascertain absent Limited exam   Left knee Unable to ascertain absent Limited exam   Right ankle Unable to ascertain absent Limited exam   Left ankle Unable to ascertain absent Limited exam   Right foot Unable to ascertain absent Limited exam   Left foot Unable to ascertain absent Limited exam       CONSULTS:     PROCEDURES:      [x] Reviewed radiology reports  (All radiology findings correlate to diagnoses/problem list)    [] Incidental 
1500-Family notified RN of wishes to change code status to DNR-CC and to terminally extubate shortly after.     1504-RN notified NCC team, Palliative, and Spiritual Care.     1512-RN notified Jeannette from [facility] at [phone] and was informed that they would be back tomorrow to assess patient.     1515-Palliative Care at bedside and updating palliative orders/code status     1528-RT/RN at bedside, and comfort care meds given.     1534-Patient terminally extubated and repositioned for comfort.     See MAR for meds given at this time  
Comprehensive Nutrition Assessment    Type and Reason for Visit:  Reassess    Nutrition Recommendations/Plan:   Continue current TF orders     Malnutrition Assessment:  Malnutrition Status:  Insufficient data (02/19/25 1441)    Context:  Acute Illness       Nutrition Assessment:    Pt remains intubated at this time, with Propofol off.  Pt does have TF orders and NGT in place, though nurse reports this has not yet started as she is waiting on a pump.  As ordered, diabetic formula (Glucerna 1.5) at ordered goal rate of 55 ml/hr would provide total of 1980 kcal, 109g protein daily at goal volume.  This meets 99% of estimated kcal and protein needs as well as meeting RDI's for vitamins/minerals.  This is appropriate and continues to be recommended.  Of note, physician notes show family would not want Trach/PEG.  No new weight available to assess.    Nutrition Related Findings:    Meds/Labs reviewed.  1+ edema to BLE.  Abd soft, round with hypoactive bowel sounds. Wound Type: Surgical Incision, Multiple (traumatic wound, rash noted per nursing documentation)       Current Nutrition Intake & Therapies:    Average Meal Intake: NPO  Average Supplements Intake: NPO  Diet NPO  ADULT TUBE FEEDING; Nasogastric; Diabetic; Continuous; 20; Yes; 10; Q 4 hours; 55; 30; Q 4 hours  Additional Calorie Sources:  None    Anthropometric Measures:  Height: 177.8 cm (5' 10\")  Ideal Body Weight (IBW): 166 lbs (75 kg)       Current Body Weight: 95.3 kg (210 lb 1.6 oz),   IBW.    Current BMI (kg/m2): 30.1           Weight Adjustment For: No Adjustment                 BMI Categories: Obese Class 1 (BMI 30.0-34.9)    Estimated Daily Nutrient Needs:  Energy Requirements Based On: Formula  Weight Used for Energy Requirements: Current  Energy (kcal/day): 3430-1841 kcal/day  Weight Used for Protein Requirements: Ideal  Protein (g/day): 110-150 gm/d  Method Used for Fluid Requirements: 1 ml/kcal  Fluid (ml/day): or per physician    Nutrition 
[facility]  PROGRESS NOTE    Shift date: 2.25.2025  Shift day: Tuesday   Shift # 1    Room # 0125/0125-01        Name: Anthony Jacobson MRN: [id number]    Age: 74 y.o.     Sex: male   Language: English   Scientology: Eastern Mandaen   Internal carotid artery occlusion, left     Referral: Other     Date: 2/25/2025            Total Time Calculated: (P) 15 min              Spiritual Assessment began in [facility] ICU        Referral/Consult From: (P) Rounding   Encounter Overview/Reason: (P) Follow-up  Service Provided For: (P) Patient and family together    Admit Date & Time: 2/19/2025  4:02 AM    Emergency Contacts Listed:  Extended Emergency Contact Information  Primary Emergency Contact: Giuseppe Jacobson  Home Phone: [phone]  Relation: Child  Preferred language: English   needed? No      Assessment:  Anthony Jacobson is a 74 y.o. male in the hospital because Internal carotid artery occlusion, left .     Upon entering the room writer observes Child and Family members appearing Calm, Coping, and Sad.  State that the patient is being moved to hospice and waiting to see if the patient is stable to go to a facility or will need to remain in the hospital.      Son, , and other family were bedside.      Perceived Need:  Experiencing a calming presence, Receive comfort, and Recieve hospitality    Intervention:  Writer introduced self and title as  and provided Actively listened, Cultivated a relationship of care and support, and Discussed plan of care.    Outcome:  Built relationships of care and support, Calming presence provided, and Demonstrated caring concern     Plan:  Chaplains will follow up as needed       Electronically signed by ANGELA Burk on 2/25/2025 at 10:51 AM  [facility]  [facility]  [phone]     02/25/25 1049   Encounter Summary   Encounter Overview/Reason Follow-up   Service Provided For Patient 
Nutrition Assessment     Type and Reason for Visit: Initial, Consult (TF orders and Management)    Nutrition Recommendations/Plan:   If TF desired, recommend diabetic ONS with goal of 55 mL/hr to provide 1980 kcals and 109 gm PRO/d  Monitor POC, swallow study results, meds, labs, wt     Malnutrition Assessment:  Malnutrition Status: Insufficient data    Nutrition Assessment:  Consulted for TF Orders and Management. Spoke with RN and plans are hopefully to extubate and complete swallow study today. Remains on propofol and cardene. Propofol running at 14.3 mL/hr = 378 kcals/d    Estimated Daily Nutrient Needs:  Energy (kcal):  0140-9878 kcals/d Weight Used for Energy Requirements: Current     Protein (g):  110-150 gm/d Weight Used for Protein Requirements: Ideal        Fluid (ml/day):  or per physician Method Used for Fluid Requirements: 1 ml/kcal    Nutrition Related Findings:   Meds/labs reviewed; +1 pitting BLE. Wound Type: None    Current Nutrition Therapies:    Diet NPO    Anthropometric Measures:  Height: 177.8 cm (5' 10\")  Current Body Wt: 95.3 kg (210 lb 1.6 oz)   BMI: 30.1        Nutrition Diagnosis:   Inadequate oral intake related to impaired respiratory function as evidenced by NPO or clear liquid status due to medical condition, intubation    Nutrition Interventions:   Food and/or Nutrient Delivery: Continue NPO  Nutrition Education/Counseling: No recommendation at this time  Coordination of Nutrition Care: Continue to monitor while inpatient, Speech Therapy, Swallow Evaluation       Goals:  Goals: Initiation of nutrition, within 2 days  Type of Goal: New goal       Nutrition Monitoring and Evaluation:   Behavioral-Environmental Outcomes: None Identified  Food/Nutrient Intake Outcomes: Progression of Nutrition, Diet Advancement/Tolerance  Physical Signs/Symptoms Outcomes: Biochemical Data, Chewing or Swallowing, Weight, Hemodynamic Status    Discharge Planning:    Too soon to determine     Vanessa Manuel MS, 
Nutrition Assessment     Type and Reason for Visit: Reassess    Nutrition Recommendations/Plan:   Suggest pleasure feeds as pt needs form of nutrition  Monitor POC     Malnutrition Assessment:  Malnutrition Status: Insufficient data    Nutrition Assessment:  Pt extubated yesterday and placed on room air. Code status changed to DNR-CC as well and family planning for [facility] care - they are coming to speak with family and see pt today per CM note. TF cancelled yesterday, no diet in place. No updated labs today. Suggest pleasure feeds.    Estimated Daily Nutrient Needs:  Energy (kcal):  8391-1970 kcal/day Weight Used for Energy Requirements: Current     Protein (g):  110-150 gm/d Weight Used for Protein Requirements: Ideal        Fluid (ml/day):  or per physician Method Used for Fluid Requirements: 1 ml/kcal    Nutrition Related Findings:   Meds/Labs reviewed. 1+ edema to BLE. Abd soft, round with active bowel sounds. Wound Type: Surgical Incision, Multiple (traumatic wound, rash noted per nursing documentation)    Current Nutrition Therapies:    Diet NPO    Anthropometric Measures:  Height: 177.8 cm (5' 10\")  Current Body Wt: 95.3 kg (210 lb 1.6 oz)   BMI: 30.1    Nutrition Diagnosis:   Inadequate oral intake related to cognitive or neurological impairment as evidenced by NPO or clear liquid status due to medical condition    Nutrition Interventions:   Food and/or Nutrient Delivery:  (Start form of nutrition)  Nutrition Education/Counseling: No recommendation at this time  Coordination of Nutrition Care: Continue to monitor while inpatient  Plan of Care discussed with: -    Goals:  Goals: Initiation of nutrition, within 2 days  Type of Goal: New goal  Previous Goal Met: Goal(s) Achieved    Nutrition Monitoring and Evaluation:   Behavioral-Environmental Outcomes: None Identified  Food/Nutrient Intake Outcomes: Progression of Nutrition, Diet Advancement/Tolerance, IVF Intake  Physical Signs/Symptoms Outcomes: 
Patient discharged to [facility] via transport. All belongings given to Giuseppe Jacobson.    
Patient extubated per physician order. Patient extubated in usual fashion. Patient placed on  room air.     CALI KO RCP   3:38 PM  
[facility]  Speech Language Pathology    Date: 2/19/2025  Patient Name: Anthony Jacobson  YOB: 1950   AGE: 74 y.o.  MRN: [id number]        Patient Not Available for Speech Therapy     Due to:  [] Testing  [] Hemodialysis  [] Cancelled by RN  [] Surgery   [x] Intubation/Sedation/Pain Medication  [] Medical instability  [] Other:    Next scheduled treatment: as medically appropriate  Completed by: Odalys Torres SLP, M.S. MARSHALL-SLP    
[facility] ([facility]) Inpatient Discharge Summary  HERBERT SANDOVAL  2/28/2025        Anthony Jacobson Jr.  1950  [id number]    Date & Time of Discharge: 2/25/2025 11:45 AM     Is consult a Victim of Crime?: No    Services provided:  Unable to complete due to medical condition    Screen Results (If any):      N/A      Discharge Recommendations:  No outpatient mental health services recommended      The therapist may be reached through the [facility] offices at [phone].    
Spiritual Health History and Assessment/Progress Note  [facility]    Follow-up, Code Stroke,  ,      Name: Anthony Jacobson MRN: [id number]    Age: 74 y.o.     Sex: male   Language: English   Gnosticist: Eastern Cheondoism   Internal carotid artery occlusion, left     Date: 2/22/2025            Total Time Calculated: 25 min              Spiritual Assessment continued in [facility] ICU        Referral/Consult From: Rounding   Encounter Overview/Reason: Follow-up  Service Provided For: Patient and family together    Paula, Belief, Meaning:   Patient identify as spiritual, are connected with a paula tradition or spiritual practice, and have beliefs or practices that help with coping during difficult times  Family/Friends identify as spiritual, are connected with a paula tradition or spiritual practice, and have beliefs or practices that help with coping during difficult times      Importance and Influence:  Patient has spiritual/personal beliefs that influence decisions regarding their health  Family/Friends have spiritual/personal beliefs that influence decisions regarding the patient's health    Community:  Patient is connected with a spiritual community and feels well-supported. Support system includes: Children and Extended family  Family/Friends are connected with a spiritual community: and feel well-supported. Support system includes: Extended family    Assessment and Plan of Care:   Patient was intubated and in critical condition. Family was present and open to spiritual care.  provided ministry of presence, offered support and prayed with family at patient's bedside. Family expressed appreciation for the spiritual and emotional support they received. Follow up visits recommended for ongoing assessment of patient's condition and for more prayers and support.    Patient Interventions include:   Family/Friends Interventions include: Facilitated expression of thoughts and feelings, Explored 
thrombectomy      Allergies  has No Known Allergies.  Medications  Prior to Admission medications    Not on File    Scheduled Meds:   insulin lispro  0-8 Units SubCUTAneous Q6H    magnesium sulfate  2,000 mg IntraVENous Once    sodium chloride flush  5-40 mL IntraVENous 2 times per day    sodium chloride flush  5-40 mL IntraVENous 2 times per day    atorvastatin  40 mg Oral Nightly    chlorhexidine  15 mL Mouth/Throat BID    pantoprazole (PROTONIX) 40 mg in sodium chloride (PF) 0.9 % 10 mL injection  40 mg IntraVENous Daily     Continuous Infusions:   dextrose      3% sodium chloride 50 mL/hr (02/20/25 1226)    sodium chloride      sodium chloride      dextrose       PRN Meds:.glucose, dextrose bolus **OR** dextrose bolus, glucagon (rDNA), dextrose, hydrALAZINE, labetalol, fentanNYL, sodium chloride flush, sodium chloride, potassium chloride **OR** potassium alternative oral replacement **OR** potassium chloride, magnesium sulfate, ondansetron **OR** ondansetron, polyethylene glycol, acetaminophen **OR** acetaminophen, sodium chloride flush, sodium chloride, glucose, dextrose bolus **OR** dextrose bolus, glucagon (rDNA), dextrose  Past Medical History   has no past medical history on file.  Past Surgical History   has a past surgical history that includes Coronary artery bypass graft (2012).  Social History   reports that he has quit smoking. His smoking use included cigarettes. He started smoking about 31 years ago. He has been exposed to tobacco smoke. He has never used smokeless tobacco.   reports no history of alcohol use.   reports no history of drug use.  Family History  family history is not on file.    Review of Systems:  Unable to be completed: Nonverbal due to current condition          OBJECTIVE:     Vitals:    02/20/25 1229   BP:    Pulse: 65   Resp: 20   Temp:    SpO2: 100%          CONSTITUTIONAL:  Initially lethargic but opening eyes to voice, progressively becoming more somnolent   HEAD:  
   Smokeless tobacco: Never   Substance Use Topics    Alcohol use: Never    Drug use: Never       REVIEW OF SYSTEMS  Unable to obtain - patient intubated     PHYSICAL ASSESSMENT:  General Appearance: sedated, ill appearing, and in no acute distress  Mental status: LIBORIO  Head: normocephalic, atraumatic, LTME running  Eye: no icterus, redness, pupils equal and reactive, extraocular eye movements intact, conjunctiva clear  Ear: normal external ear, no discharge  Nose: no drainage noted  Mouth: mucous membranes moist, ET tube present  Neck: supple,  Lungs: normal effort on vent  Cardiovascular: normal rate, regular rhythm, no murmur, gallop, rub  Abdomen: Soft, nontender, nondistended  Neurologic: Awake and alert, spontaneously moving left lower extremity.  Skin: No gross lesions, rashes, bruising or bleeding on exposed skin area  Psych: LIBORIO    Palliative Performance Scale:  ___100% Full ambulation; normal activity/No disease; full self-care; normal intake; LOC full  ___90% full ambulation; normal activity/some disease; full self-care; normal intake; LOC full  ___80% ambulation full; normal activity with effort/some disease; full self-care; normal/reduced intake; LOC full  ___70% ambulation reduced; cannot do normal work/some disease; full self-care; normal/reduce intake; LOC full  ___60%  Ambulation reduced; Significant disease; Can't do hobbies/housework; intake normal or reduced; occasional assist; LOC full/confusion  ___50%  Mainly sit/lie; Extensive disease; Can't do any work; Considerable assist; intake normal or reduced; LOC full/confusion  ___40%  Mainly in bed; Extensive disease; Mainly assist; intake normal or reduced; LOC full/confusion   ___30%  Bed Bound; Extensive disease; Total care; intake reduced; LOC full/confusion  ___20%  Bed Bound; Extensive disease; Total care; intake minimal; Drowsy/coma  __x_10%  Bed Bound; Extensive disease; Total care; Mouth care only; Drowsy/coma  ___0       Death    Principle 
to be communicated to a licensed caregiver.         XR ELBOW RIGHT (2 VIEWS)   Final Result   1.  Soft tissue injury with hematoma, laceration and soft tissue emphysema at   the dorsum of the elbow.      2.  Suspect widening of the joint spaces on the lateral view and could have   subluxation.  Correlate for ability to a straightening elbow or reposition   and repeat x-rays..         XR RADIUS ULNA RIGHT (2 VIEWS)   Final Result   No acute osseous abnormality identified.         CT HEAD WO CONTRAST   Final Result   1.  Focal area of hemorrhage in the right cerebellum with mild surrounding   edema, but no mass effect or midline shift.  This would be an unusual   appearance for trauma.  Hemorrhagic ischemic change or possibly mass should   also be considered.      2.  Hyperdense left MCA concerning for thrombus, as evaluated on subsequent   CTA.      3.  No acute osseous abnormality identified in the cervical spine.      4.  Findings suggestive of interstitial edema in the lung apices.      Findings were discussed with ERICA BROOKS at 6:15 am on 2/19/2025.         CT CERVICAL SPINE WO CONTRAST   Final Result   1.  Focal area of hemorrhage in the right cerebellum with mild surrounding   edema, but no mass effect or midline shift.  This would be an unusual   appearance for trauma.  Hemorrhagic ischemic change or possibly mass should   also be considered.      2.  Hyperdense left MCA concerning for thrombus, as evaluated on subsequent   CTA.      3.  No acute osseous abnormality identified in the cervical spine.      4.  Findings suggestive of interstitial edema in the lung apices.      Findings were discussed with ERICA BROOKS at 6:15 am on 2/19/2025.         CT THORACIC SPINE BONY RECONSTRUCTION   Final Result   1. No evidence of acute traumatic injury of the thoracic spine.   2. Old compression fractures of T8, T9, T10, T11.         CT LUMBAR SPINE BONY RECONSTRUCTION   Final Result   1. No evidence of acute traumatic
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
## 2025-04-09 ENCOUNTER — TELEPHONE (OUTPATIENT)
Dept: FAMILY MEDICINE CLINIC | Age: 75
End: 2025-04-09

## (undated) DEVICE — CATH BLLN ANGIO 4X15MM NC EUPHORIA RX

## (undated) DEVICE — CATHETER GUID 6FR L150CM RAP EXCHG L25CM TIP 5.1FR PUSHROD

## (undated) DEVICE — INTRODUCER SHTH 6FR L11CM 0.038IN STD SIDEPRT EXTN 3 W

## (undated) DEVICE — INFLATION KIT CUST REV

## (undated) DEVICE — ANGIOGRAPHIC CATHETER: Brand: EXPO™

## (undated) DEVICE — TRAY SURG CUST CRD CATH TOLEDO

## (undated) DEVICE — CATH BLLN ANGIO 3.50X12MM NC EUPHORIA RX

## (undated) DEVICE — ANGIO-SEAL VIP VASCULAR CLOSURE DEVICE: Brand: ANGIO-SEAL

## (undated) DEVICE — Device: Brand: EAGLE EYE PLATINUM RX DIGITAL IVUS CATHETER

## (undated) DEVICE — Device: Brand: PROWATERFLEX

## (undated) DEVICE — CATHETER GUIDE AD 7FR L100CM COR PERIPH ORNG NYL PTFE XB L

## (undated) DEVICE — RUNTHROUGH NS EXTRA FLOPPY PTCA GUIDEWIRE: Brand: RUNTHROUGH

## (undated) DEVICE — CATHETER IVL C2PLUS SHOCKWAVE 3.0MM X 12MM

## (undated) DEVICE — CATH BLLN ANGIO 2.50X15MM SC EUPHORA RX

## (undated) DEVICE — CATH BLLN ANGIO 2.25X15MM SC EUPHORA RX

## (undated) DEVICE — CATH BLLN ANGIO 3.50X8MM NC EUPHORIA RX

## (undated) DEVICE — Device

## (undated) DEVICE — INTRODUCER SHTH 7FR CANN L11CM 0.038IN STD ORNG W/ MINI

## (undated) DEVICE — KIT MICRO INTRO 4FR STIFF 40CM NIGHTENALL TUNGSTEN 7SMT

## (undated) DEVICE — GUIDEWIRE 35/260/FC/PTFE/3J: Brand: GUIDEWIRE

## (undated) DEVICE — CATH BLLN ANGIO 3X15MM SC EUPHORA RX